# Patient Record
Sex: FEMALE | Race: ASIAN | NOT HISPANIC OR LATINO | Employment: FULL TIME | URBAN - METROPOLITAN AREA
[De-identification: names, ages, dates, MRNs, and addresses within clinical notes are randomized per-mention and may not be internally consistent; named-entity substitution may affect disease eponyms.]

---

## 2017-03-09 ENCOUNTER — ALLSCRIPTS OFFICE VISIT (OUTPATIENT)
Dept: OTHER | Facility: OTHER | Age: 47
End: 2017-03-09

## 2017-03-09 LAB
BILIRUB UR QL STRIP: NORMAL
CLARITY UR: NORMAL
COLOR UR: YELLOW
GLUCOSE (HISTORICAL): NORMAL
HGB UR QL STRIP.AUTO: 250
KETONES UR STRIP-MCNC: NORMAL MG/DL
LEUKOCYTE ESTERASE UR QL STRIP: 500
NITRITE UR QL STRIP: NORMAL
PH UR STRIP.AUTO: 7 [PH]
PROT UR STRIP-MCNC: NORMAL MG/DL
SP GR UR STRIP.AUTO: 1.01
UROBILINOGEN UR QL STRIP.AUTO: NORMAL

## 2017-03-12 ENCOUNTER — LAB CONVERSION - ENCOUNTER (OUTPATIENT)
Dept: OTHER | Facility: OTHER | Age: 47
End: 2017-03-12

## 2017-03-12 LAB
BACTERIA UR QL AUTO: ABNORMAL /HPF
BILIRUB UR QL STRIP: NEGATIVE
COLOR UR: YELLOW
COMMENT (HISTORICAL): CLEAR
CULTURE RESULT (HISTORICAL): ABNORMAL
FECAL OCCULT BLOOD DIAGNOSTIC (HISTORICAL): ABNORMAL
GLUCOSE (HISTORICAL): NEGATIVE
HYALINE CASTS #/AREA URNS LPF: ABNORMAL /LPF
KETONES UR STRIP-MCNC: NEGATIVE MG/DL
LEUKOCYTE ESTERASE UR QL STRIP: ABNORMAL
NITRITE UR QL STRIP: POSITIVE
PH UR STRIP.AUTO: 7 [PH] (ref 5–8)
PROT UR STRIP-MCNC: ABNORMAL MG/DL
RBC (HISTORICAL): ABNORMAL /HPF
SP GR UR STRIP.AUTO: 1.02 (ref 1–1.03)
SQUAMOUS EPITHELIAL CELLS (HISTORICAL): ABNORMAL /HPF
WBC # BLD AUTO: ABNORMAL /HPF

## 2017-03-13 ENCOUNTER — GENERIC CONVERSION - ENCOUNTER (OUTPATIENT)
Dept: OTHER | Facility: OTHER | Age: 47
End: 2017-03-13

## 2017-05-03 ENCOUNTER — APPOINTMENT (OUTPATIENT)
Dept: LAB | Facility: CLINIC | Age: 47
End: 2017-05-03
Payer: COMMERCIAL

## 2017-05-03 DIAGNOSIS — E78.5 HYPERLIPIDEMIA: ICD-10-CM

## 2017-05-03 LAB
CHOLEST SERPL-MCNC: 215 MG/DL (ref 50–200)
HDLC SERPL-MCNC: 57 MG/DL (ref 40–60)
LDLC SERPL CALC-MCNC: 142 MG/DL (ref 0–100)
TRIGL SERPL-MCNC: 78 MG/DL

## 2017-05-03 PROCEDURE — 36415 COLL VENOUS BLD VENIPUNCTURE: CPT

## 2017-05-03 PROCEDURE — 80061 LIPID PANEL: CPT

## 2017-05-17 ENCOUNTER — ALLSCRIPTS OFFICE VISIT (OUTPATIENT)
Dept: OTHER | Facility: OTHER | Age: 47
End: 2017-05-17

## 2017-05-19 ENCOUNTER — ALLSCRIPTS OFFICE VISIT (OUTPATIENT)
Dept: OTHER | Facility: OTHER | Age: 47
End: 2017-05-19

## 2017-05-19 LAB
BILIRUB UR QL STRIP: NORMAL
CLARITY UR: NORMAL
COLOR UR: YELLOW
GLUCOSE (HISTORICAL): NORMAL
HGB UR QL STRIP.AUTO: 250
KETONES UR STRIP-MCNC: NORMAL MG/DL
LEUKOCYTE ESTERASE UR QL STRIP: NORMAL
NITRITE UR QL STRIP: NORMAL
PH UR STRIP.AUTO: 7 [PH]
PROT UR STRIP-MCNC: NORMAL MG/DL
SP GR UR STRIP.AUTO: 1.01
UROBILINOGEN UR QL STRIP.AUTO: NORMAL

## 2017-05-21 ENCOUNTER — GENERIC CONVERSION - ENCOUNTER (OUTPATIENT)
Dept: OTHER | Facility: OTHER | Age: 47
End: 2017-05-21

## 2017-05-21 ENCOUNTER — LAB CONVERSION - ENCOUNTER (OUTPATIENT)
Dept: OTHER | Facility: OTHER | Age: 47
End: 2017-05-21

## 2017-05-21 LAB
BACTERIA UR QL AUTO: ABNORMAL /HPF
BILIRUB UR QL STRIP: NEGATIVE
COLOR UR: YELLOW
COMMENT (HISTORICAL): CLEAR
CULTURE RESULT (HISTORICAL): ABNORMAL
CULTURE RESULT (HISTORICAL): NORMAL
FECAL OCCULT BLOOD DIAGNOSTIC (HISTORICAL): ABNORMAL
GLUCOSE (HISTORICAL): NEGATIVE
HYALINE CASTS #/AREA URNS LPF: ABNORMAL /LPF
KETONES UR STRIP-MCNC: NEGATIVE MG/DL
LEUKOCYTE ESTERASE UR QL STRIP: ABNORMAL
NITRITE UR QL STRIP: NEGATIVE
PH UR STRIP.AUTO: 7 [PH] (ref 5–8)
PROT UR STRIP-MCNC: NEGATIVE MG/DL
RBC (HISTORICAL): ABNORMAL /HPF
SP GR UR STRIP.AUTO: 1.01 (ref 1–1.03)
SQUAMOUS EPITHELIAL CELLS (HISTORICAL): ABNORMAL /HPF
WBC # BLD AUTO: ABNORMAL /HPF

## 2017-05-23 ENCOUNTER — ALLSCRIPTS OFFICE VISIT (OUTPATIENT)
Dept: OTHER | Facility: OTHER | Age: 47
End: 2017-05-23

## 2017-06-15 ENCOUNTER — ALLSCRIPTS OFFICE VISIT (OUTPATIENT)
Dept: OTHER | Facility: OTHER | Age: 47
End: 2017-06-15

## 2017-06-15 DIAGNOSIS — N39.0 URINARY TRACT INFECTION: ICD-10-CM

## 2017-06-15 DIAGNOSIS — R31.9 HEMATURIA: ICD-10-CM

## 2017-06-15 LAB
BILIRUB UR QL STRIP: NORMAL
CLARITY UR: NORMAL
COLOR UR: YELLOW
GLUCOSE (HISTORICAL): NORMAL
HGB UR QL STRIP.AUTO: 250
KETONES UR STRIP-MCNC: NORMAL MG/DL
LEUKOCYTE ESTERASE UR QL STRIP: NORMAL
NITRITE UR QL STRIP: NORMAL
PH UR STRIP.AUTO: 5 [PH]
PROT UR STRIP-MCNC: NORMAL MG/DL
SP GR UR STRIP.AUTO: 1
UROBILINOGEN UR QL STRIP.AUTO: NORMAL

## 2017-06-16 ENCOUNTER — APPOINTMENT (OUTPATIENT)
Dept: LAB | Facility: CLINIC | Age: 47
End: 2017-06-16
Payer: COMMERCIAL

## 2017-06-16 ENCOUNTER — TRANSCRIBE ORDERS (OUTPATIENT)
Dept: ADMINISTRATIVE | Facility: HOSPITAL | Age: 47
End: 2017-06-16

## 2017-06-16 DIAGNOSIS — E78.5 HYPERLIPIDEMIA: ICD-10-CM

## 2017-06-16 DIAGNOSIS — R31.9 HEMATURIA SYNDROME: Primary | ICD-10-CM

## 2017-06-16 LAB
ALBUMIN SERPL BCP-MCNC: 3.5 G/DL (ref 3.5–5)
ALP SERPL-CCNC: 66 U/L (ref 46–116)
ALT SERPL W P-5'-P-CCNC: 32 U/L (ref 12–78)
ANION GAP SERPL CALCULATED.3IONS-SCNC: 5 MMOL/L (ref 4–13)
AST SERPL W P-5'-P-CCNC: 21 U/L (ref 5–45)
BILIRUB SERPL-MCNC: 0.5 MG/DL (ref 0.2–1)
BUN SERPL-MCNC: 12 MG/DL (ref 5–25)
CALCIUM SERPL-MCNC: 8.7 MG/DL (ref 8.3–10.1)
CHLORIDE SERPL-SCNC: 103 MMOL/L (ref 100–108)
CO2 SERPL-SCNC: 33 MMOL/L (ref 21–32)
CREAT SERPL-MCNC: 0.69 MG/DL (ref 0.6–1.3)
ERYTHROCYTE [DISTWIDTH] IN BLOOD BY AUTOMATED COUNT: 12.2 % (ref 11.6–15.1)
GFR SERPL CREATININE-BSD FRML MDRD: >60 ML/MIN/1.73SQ M
GLUCOSE P FAST SERPL-MCNC: 97 MG/DL (ref 65–99)
HCT VFR BLD AUTO: 45.9 % (ref 34.8–46.1)
HGB BLD-MCNC: 14.8 G/DL (ref 11.5–15.4)
MCH RBC QN AUTO: 29.3 PG (ref 26.8–34.3)
MCHC RBC AUTO-ENTMCNC: 32.2 G/DL (ref 31.4–37.4)
MCV RBC AUTO: 91 FL (ref 82–98)
PLATELET # BLD AUTO: 221 THOUSANDS/UL (ref 149–390)
PMV BLD AUTO: 10.3 FL (ref 8.9–12.7)
POTASSIUM SERPL-SCNC: 3.8 MMOL/L (ref 3.5–5.3)
PROT SERPL-MCNC: 7.3 G/DL (ref 6.4–8.2)
RBC # BLD AUTO: 5.05 MILLION/UL (ref 3.81–5.12)
SODIUM SERPL-SCNC: 141 MMOL/L (ref 136–145)
WBC # BLD AUTO: 11 THOUSAND/UL (ref 4.31–10.16)

## 2017-06-16 PROCEDURE — 85027 COMPLETE CBC AUTOMATED: CPT

## 2017-06-16 PROCEDURE — 36415 COLL VENOUS BLD VENIPUNCTURE: CPT

## 2017-06-16 PROCEDURE — 80053 COMPREHEN METABOLIC PANEL: CPT

## 2017-06-19 ENCOUNTER — GENERIC CONVERSION - ENCOUNTER (OUTPATIENT)
Dept: OTHER | Facility: OTHER | Age: 47
End: 2017-06-19

## 2017-06-22 ENCOUNTER — HOSPITAL ENCOUNTER (OUTPATIENT)
Dept: CT IMAGING | Facility: HOSPITAL | Age: 47
Discharge: HOME | End: 2017-06-22
Attending: FAMILY MEDICINE
Payer: COMMERCIAL

## 2017-06-22 DIAGNOSIS — R31.9 HEMATURIA: ICD-10-CM

## 2017-06-22 PROCEDURE — 74176 CT ABD & PELVIS W/O CONTRAST: CPT

## 2017-06-26 ENCOUNTER — GENERIC CONVERSION - ENCOUNTER (OUTPATIENT)
Dept: OTHER | Facility: OTHER | Age: 47
End: 2017-06-26

## 2017-06-29 ENCOUNTER — ALLSCRIPTS OFFICE VISIT (OUTPATIENT)
Dept: OTHER | Facility: OTHER | Age: 47
End: 2017-06-29

## 2017-07-21 ENCOUNTER — TRANSCRIBE ORDERS (OUTPATIENT)
Dept: LAB | Facility: CLINIC | Age: 47
End: 2017-07-21

## 2017-07-21 ENCOUNTER — APPOINTMENT (OUTPATIENT)
Dept: LAB | Facility: CLINIC | Age: 47
End: 2017-07-21
Payer: COMMERCIAL

## 2017-07-21 DIAGNOSIS — R31.9 HEMATURIA: ICD-10-CM

## 2017-07-21 DIAGNOSIS — N39.0 URINARY TRACT INFECTION: ICD-10-CM

## 2017-07-21 LAB
ALBUMIN SERPL BCP-MCNC: 3.6 G/DL (ref 3.5–5)
ALP SERPL-CCNC: 68 U/L (ref 46–116)
ALT SERPL W P-5'-P-CCNC: 30 U/L (ref 12–78)
ANION GAP SERPL CALCULATED.3IONS-SCNC: 5 MMOL/L (ref 4–13)
AST SERPL W P-5'-P-CCNC: 27 U/L (ref 5–45)
BASOPHILS # BLD AUTO: 0.01 THOUSANDS/ΜL (ref 0–0.1)
BASOPHILS NFR BLD AUTO: 0 % (ref 0–1)
BILIRUB SERPL-MCNC: 0.6 MG/DL (ref 0.2–1)
BUN SERPL-MCNC: 12 MG/DL (ref 5–25)
CALCIUM SERPL-MCNC: 9.1 MG/DL (ref 8.3–10.1)
CHLORIDE SERPL-SCNC: 100 MMOL/L (ref 100–108)
CO2 SERPL-SCNC: 32 MMOL/L (ref 21–32)
CREAT SERPL-MCNC: 0.69 MG/DL (ref 0.6–1.3)
EOSINOPHIL # BLD AUTO: 0.12 THOUSAND/ΜL (ref 0–0.61)
EOSINOPHIL NFR BLD AUTO: 2 % (ref 0–6)
ERYTHROCYTE [DISTWIDTH] IN BLOOD BY AUTOMATED COUNT: 11.7 % (ref 11.6–15.1)
GFR SERPL CREATININE-BSD FRML MDRD: >60 ML/MIN/1.73SQ M
GLUCOSE SERPL-MCNC: 91 MG/DL (ref 65–140)
HCT VFR BLD AUTO: 43.3 % (ref 34.8–46.1)
HGB BLD-MCNC: 14.3 G/DL (ref 11.5–15.4)
LYMPHOCYTES # BLD AUTO: 2.25 THOUSANDS/ΜL (ref 0.6–4.47)
LYMPHOCYTES NFR BLD AUTO: 40 % (ref 14–44)
MCH RBC QN AUTO: 29.3 PG (ref 26.8–34.3)
MCHC RBC AUTO-ENTMCNC: 33 G/DL (ref 31.4–37.4)
MCV RBC AUTO: 89 FL (ref 82–98)
MONOCYTES # BLD AUTO: 0.36 THOUSAND/ΜL (ref 0.17–1.22)
MONOCYTES NFR BLD AUTO: 7 % (ref 4–12)
NEUTROPHILS # BLD AUTO: 2.84 THOUSANDS/ΜL (ref 1.85–7.62)
NEUTS SEG NFR BLD AUTO: 51 % (ref 43–75)
PLATELET # BLD AUTO: 223 THOUSANDS/UL (ref 149–390)
PMV BLD AUTO: 10 FL (ref 8.9–12.7)
POTASSIUM SERPL-SCNC: 3.6 MMOL/L (ref 3.5–5.3)
PROT SERPL-MCNC: 7.2 G/DL (ref 6.4–8.2)
RBC # BLD AUTO: 4.88 MILLION/UL (ref 3.81–5.12)
SODIUM SERPL-SCNC: 137 MMOL/L (ref 136–145)
WBC # BLD AUTO: 5.58 THOUSAND/UL (ref 4.31–10.16)

## 2017-07-21 PROCEDURE — 85025 COMPLETE CBC W/AUTO DIFF WBC: CPT

## 2017-07-21 PROCEDURE — 36415 COLL VENOUS BLD VENIPUNCTURE: CPT

## 2017-07-21 PROCEDURE — 80053 COMPREHEN METABOLIC PANEL: CPT

## 2017-07-31 ENCOUNTER — ALLSCRIPTS OFFICE VISIT (OUTPATIENT)
Dept: OTHER | Facility: OTHER | Age: 47
End: 2017-07-31

## 2017-09-21 ENCOUNTER — ALLSCRIPTS OFFICE VISIT (OUTPATIENT)
Dept: OTHER | Facility: OTHER | Age: 47
End: 2017-09-21

## 2017-09-21 LAB
CLARITY UR: NORMAL
COLOR UR: CLEAR
GLUCOSE (HISTORICAL): NORMAL
HGB UR QL STRIP.AUTO: NORMAL
KETONES UR STRIP-MCNC: NORMAL MG/DL
LEUKOCYTE ESTERASE UR QL STRIP: NORMAL
NITRITE UR QL STRIP: NORMAL
PH UR STRIP.AUTO: 7.5 [PH]
PROT UR STRIP-MCNC: NORMAL MG/DL
SP GR UR STRIP.AUTO: 1

## 2017-10-17 ENCOUNTER — APPOINTMENT (OUTPATIENT)
Dept: LAB | Facility: HOSPITAL | Age: 47
End: 2017-10-17
Attending: UROLOGY
Payer: COMMERCIAL

## 2017-10-17 ENCOUNTER — ALLSCRIPTS OFFICE VISIT (OUTPATIENT)
Dept: OTHER | Facility: OTHER | Age: 47
End: 2017-10-17

## 2017-10-17 DIAGNOSIS — R30.0 DYSURIA: ICD-10-CM

## 2017-10-17 DIAGNOSIS — R31.9 HEMATURIA: ICD-10-CM

## 2017-10-17 LAB
CLARITY UR: NORMAL
COLOR UR: YELLOW
GLUCOSE (HISTORICAL): NORMAL
HGB UR QL STRIP.AUTO: NORMAL
KETONES UR STRIP-MCNC: NORMAL MG/DL
LEUKOCYTE ESTERASE UR QL STRIP: NORMAL
NITRITE UR QL STRIP: NORMAL
PH UR STRIP.AUTO: 6 [PH]
PROT UR STRIP-MCNC: NORMAL MG/DL
SP GR UR STRIP.AUTO: 1.01

## 2017-10-17 PROCEDURE — 88112 CYTOPATH CELL ENHANCE TECH: CPT

## 2017-11-13 ENCOUNTER — APPOINTMENT (OUTPATIENT)
Dept: LAB | Facility: CLINIC | Age: 47
End: 2017-11-13
Payer: COMMERCIAL

## 2017-11-13 ENCOUNTER — TRANSCRIBE ORDERS (OUTPATIENT)
Dept: LAB | Facility: CLINIC | Age: 47
End: 2017-11-13

## 2017-11-13 DIAGNOSIS — E78.5 HYPERLIPIDEMIA: ICD-10-CM

## 2017-11-13 LAB
CHOLEST SERPL-MCNC: 218 MG/DL (ref 50–200)
HDLC SERPL-MCNC: 54 MG/DL (ref 40–60)
LDLC SERPL CALC-MCNC: 150 MG/DL (ref 0–100)
TRIGL SERPL-MCNC: 72 MG/DL
TSH SERPL DL<=0.05 MIU/L-ACNC: 1.44 UIU/ML (ref 0.36–3.74)

## 2017-11-13 PROCEDURE — 36415 COLL VENOUS BLD VENIPUNCTURE: CPT

## 2017-11-13 PROCEDURE — 84443 ASSAY THYROID STIM HORMONE: CPT

## 2017-11-13 PROCEDURE — 80061 LIPID PANEL: CPT

## 2017-11-17 ENCOUNTER — ALLSCRIPTS OFFICE VISIT (OUTPATIENT)
Dept: OTHER | Facility: OTHER | Age: 47
End: 2017-11-17

## 2017-11-17 DIAGNOSIS — N39.0 URINARY TRACT INFECTION: ICD-10-CM

## 2017-11-17 LAB
BILIRUB UR QL STRIP: NORMAL
CLARITY UR: NORMAL
COLOR UR: YELLOW
GLUCOSE (HISTORICAL): NORMAL
HGB UR QL STRIP.AUTO: 50
KETONES UR STRIP-MCNC: NORMAL MG/DL
LEUKOCYTE ESTERASE UR QL STRIP: NORMAL
NITRITE UR QL STRIP: NORMAL
PH UR STRIP.AUTO: 7 [PH]
PROT UR STRIP-MCNC: NORMAL MG/DL
SP GR UR STRIP.AUTO: 1.01
UROBILINOGEN UR QL STRIP.AUTO: NORMAL

## 2017-11-18 NOTE — PROGRESS NOTES
Assessment    1  Hematuria (599 70) (R31 9)   2  Acute lower UTI (599 0) (N39 0)   3  Vaginal discharge (623 5) (N89 8)    Plan   Vaginal discharge    · *(Q) SURESWAB(R), VAGINOSIS/VAGINITIS PLUS; Status:Active; Requestedfor:2017;   Urine Dip Automated- POC; Status:Active - Perform Order; Requested [id number];  [id number]; Ordered; For:Acute UTI (urinary tract infection); Ordered By:Julisa Galvan;    Discussion/Summary    20FP F presented to the office with her boyfriend for evaluation of hematuriaHematuriaurine dip negative in office for UTI but (+) bloodUCx not sentpt does have a pre-existing h/o hematuria and follows with Urologys/p cystoscopy 10/17/2017 - advised to follow up in 1yr with UA and US postvoid residualsVaginal dischargeetiology unclear - t/c BV, yeast infectionvaginal exam done in the office and SureSwab sentwill contact pt with results to determine further course of treatment - pt agreeable  Chief Complaint  blood in urine      History of Present Illness  HPI: 50yo F presents to the office with her BF for evaluation of blood in urinestarted seeing blood in urine yesterdaywas evaluated by Urology 10/17/2017 - had a cystoscopy which showed that hematuria w/u is consistent with a thick bladder wall which maybe a consequence of increased outlet resistance from prior bladder sling surgery   no other significant path detected(+) increased urinary frequency - no burning/pain or pressure when urinatingwhite discharge n4ozebe - not thick, not clumpy, no odor to itnot sexually active for the past 5months; do not use condomsdenies F/C/CP/SOB/abd pain/suprapubic pain/CVA tenderness- CBC, CMP from 2017 within normal range      Review of Systems    ROS reviewed  as per HPI      Active Problems  1  Acute cystitis with hematuria (595 0) (N30 01)   2  Acute lower UTI (599 0) (N39 0)   3  Acute upper respiratory infection (465 9) (J06 9)   4   Acute UTI (urinary tract infection) (599 0) (N39 0) 5  Allergic rhinitis (477 9) (J30 9)   6  Asthma (493 90) (J45 909)   7  Asthma, mild intermittent (493 90) (J45 20)   8  Dysuria (788 1) (R30 0)   9  Encounter for routine gynecological examination (V72 31) (Z01 419)   10  Hematuria (599 70) (R31 9)   11  History of leukocytosis (V12 3) (Z86 2)   12  Hypercholesterolemia (272 0) (E78 00)   13  Hyperlipidemia (272 4) (E78 5)   14  Hypocalcemia (275 41) (E83 51)   15  Iron deficiency anemia (280 9) (D50 9)   16  Need for influenza vaccination (V04 81) (Z23)   17  Need for vaccine for TD (tetanus-diphtheria) (V06 5) (Z23)   18  Nonspecific abnormal results of function study of thyroid (794 5) (R94 6)   19  Plantar fasciitis, left (728 71) (M72 2)   20  Visit for screening mammogram (V76 12) (Z12 31)    Past Medical History  1  History of leukocytosis (V12 3) (Z86 2)   2  Need for vaccine for TD (tetanus-diphtheria) (V06 5) (Z23)  Active Problems And Past Medical History Reviewed: The active problems and past medical history were reviewed and updated today  Family History  Mother    1  Denied: Family history of depression   2  Denied: Family history of substance abuse  Father    3  Denied: Family history of depression   4  Denied: Family history of substance abuse  Sibling    5  Denied: Family history of depression  Family History Reviewed: The family history was reviewed and updated today  Social History   · Four children   · Never a smoker   · Never A Smoker   · No drug use   · Single   · Social alcohol use (Z78 9)  The social history was reviewed and updated today  Surgical History    1  History of Diagnostic Cystoscopy   2  History of Suprapubic Sling Operation  Surgical History Reviewed: The surgical history was reviewed and updated today  Current Meds   1  Montelukast Sodium 10 MG Oral Tablet; 1 Tab daily; Therapy: 64AHL4192 to (Last Rx:56Npx3909)  Requested for: 71CNC4988 Ordered   2   ProAir  (90 Base) MCG/ACT Inhalation Aerosol Solution; 2 puffs every 4 houre prn; Therapy: 53PPV4127 to (Last Rx:69Sfy2472)  Requested for: 93UAE3120 Ordered    The medication list was reviewed and updated today  Allergies  1  No Known Drug Allergies    Vitals   Recorded: 48YFU4974 09:40AM   Temperature 98 F   Heart Rate 67   Respiration 16   Systolic 285   Diastolic 70   Height 5 ft 3 in   Weight 151 lb    BMI Calculated 26 75   BSA Calculated 1 72   O2 Saturation 97       Physical Exam   Constitutional  General appearance: No acute distress, well appearing and well nourished  Eyes  Conjunctiva and lids: No swelling, erythema or discharge  Pupils and irises: Equal, round, reactive to light  Pupils: no nystagmus  Cornea, Lens, and Sclera: Bilateral eyes: sclera was not yellow  Pulmonary  Respiratory effort: No increased work of breathing or signs of respiratory distress  Auscultation of lungs: Clear to auscultation  Cardiovascular  Auscultation of heart: Normal rate and rhythm, normal S1 and S2, no murmurs  Heart sounds: normal S1-- and-- normal S2  no murmurs were heard  Abdomen  Abdomen: Non-tender, no masses  The abdomen was flat  Bowel sounds were normal  The abdomen was soft and nontender  There was tenderness not in the suprapubic area  No rebound tenderness  No guarding  no CVA tenderness  Genitourinary  External genitalia and vagina: Normal, no lesions appreciated  External genitalia: normal,-- normal hair distribution,-- no vulvar atrophy-- and-- no condyloma acuminatum  Labia majora: normal  Labia minora: normal  Vagina: scant bleeding, but-- normal-- and-- no discharge  Urethra: Normal, no discharge  The urethra was normal   Cervix: Normal, no lesions  Examination of the cervix revealed normal findings,-- no vesicles-- and-- no discharge  Bimanual exam findings include a soft cervix, but-- no cervical motion tenderness    Musculoskeletal  Gait and station: Normal    Psychiatric  Orientation to person, place, and time: Normal    Mood and affect: Normal        Future Appointments    Date/Time Provider Specialty Site   11/20/2017 08:00 AM ANA MARIA Rubio  [facility]   10/25/2018 08:15 AM ANA MARIA Fatima   Urology [facility]   Electronically signed by : Daryle Sheng, DO; Nov 17 2017  1:07PM EST                       (Author)

## 2017-11-20 ENCOUNTER — ALLSCRIPTS OFFICE VISIT (OUTPATIENT)
Dept: OTHER | Facility: OTHER | Age: 47
End: 2017-11-20

## 2017-11-21 ENCOUNTER — LAB CONVERSION - ENCOUNTER (OUTPATIENT)
Dept: OTHER | Facility: OTHER | Age: 47
End: 2017-11-21

## 2017-11-21 LAB
ATOPOBIUM VAGINAE (HISTORICAL): NOT DETECTED LOG (CELLS/ML)
BV CATEGORY (HISTORICAL): NORMAL
C. ALBICANS, DNA OR PCR (HISTORICAL): NOT DETECTED
C. GLABRATA, DNA OR PCR (HISTORICAL): NOT DETECTED
C. PARAPSILOSIS, DNA OR PCR (HISTORICAL): NOT DETECTED
C. TROPICALIS, DNA OR PCR (HISTORICAL): NOT DETECTED
CHLAMYDIA TRACHOMATIS BY MOL. METHOD (HISTORICAL): NOT DETECTED
GARDNERELLA VAGINALIS (HISTORICAL): NOT DETECTED LOG (CELLS/ML)
GC BY MOL. METHOD (HISTORICAL): NOT DETECTED
LACTOBACILLUS (SPECIES) (HISTORICAL): NOT DETECTED LOG (CELLS/ML)
MEGASPHAERA TYPE 1 (HISTORICAL): NOT DETECTED LOG (CELLS/ML)
TRICHOMONAS (HISTORICAL): NOT DETECTED

## 2017-11-21 NOTE — PROGRESS NOTES
Assessment    1  Hyperlipidemia (272 4) (E78 5)   2  History of influenza vaccination (V49 89) (Z92 29)   3  Need for influenza vaccination (V04 81) (Z23)    Plan  Hyperlipidemia    · A diet low in sugar may help your condition ; Status:Complete;   Done: 73MVO4633   · Avoid alcoholic beverages ; Status:Complete;   Done: 58QAV0617   · Begin a limited exercise program ; Status:Complete;   Done: 77SZM8800   · Continue with our present treatment plan ; Status:Complete;   Done: 29FBG7304   · Eat a low fat and low cholesterol diet ; Status:Complete;   Done: 92SOP2777   · Eat no more than 30 grams of fat per day ; Status:Complete;   Done: 59LAT1908   · Some eating tips that can help you lose weight ; Status:Complete;   Done: 53FFE9185   · (1) CBC/ PLT (NO DIFF); Status:Active; Requested for:10Cqv0764;    · (1) COMPREHENSIVE METABOLIC PANEL; Status:Active; Requested for:45Buw4765;    · (1) LIPID PANEL, FASTING; Status:Active; Requested for:07May2018;   Need for influenza vaccination    · Fluzone Quadrivalent 0 5 ML Intramuscular Suspension Prefilled Syringe;INJECT 0 5  ML Intramuscular; To Be Done: 19ICY6728    Discussion/Summary    Her labs discussed with her and she has hyperlipidemia she has some improvement few months ago but now can LDL has gone up, she still has normal HDL  And her risk factors are low, like she does not smoke  And she has no family history of heart disease or stroke  And she has normal blood pressure  is slightly overweight discussed with her to lose weight and discussed in length with her about low-fat diet and we can still work on diet and exercise and follow-up labs in 6 months, discussed about medication statins but patient is still not willing,swab is pending the culture is not back yet,and f/u 6 months for hyperlipidemia, flu vaccine is given     The patient was counseled regarding diagnostic results,-- instructions for management,-- prognosis,-- impressions,-- risks and benefits of treatment options,-- importance of compliance with treatment  Chief Complaint  follow up, review labs      History of Present Illness  She is here with the boyfriend, she had labs for cholesterol checksays she is very active she works all day and walking  She does not eat high fat food  Previously she had hyperlipidemia and she controlled with the diet  is nonsmoker  And she denies any family history of heart diseasestroke  She has history of immaturity and follows urologist  And she had some vaginal discharge and last week she was checked by the other physician and vaginal swab was sent ,the result is pending      Review of Systems   Constitutional: No fever, no chills, feels well, no tiredness, no recent weight gain or weight loss  Eyes: No complaints of eye pain, no red eyes, no eyesight problems, no discharge, no dry eyes, no itching of eyes  ENT: no complaints of earache, no loss of hearing, no nose bleeds, no nasal discharge, no sore throat, no hoarseness  Cardiovascular: No complaints of slow heart rate, no fast heart rate, no chest pain, no palpitations, no leg claudication, no lower extremity edema  Respiratory: No complaints of shortness of breath, no wheezing, no cough, no SOB on exertion, no orthopnea, no PND  Gastrointestinal: No complaints of abdominal pain, no constipation, no nausea or vomiting, no diarrhea, no bloody stools  Genitourinary: as noted in HPI  Musculoskeletal: No complaints of arthralgias, no myalgias, no joint swelling or stiffness, no limb pain or swelling  Integumentary: No complaints of skin rash or lesions, no itching, no skin wounds, no breast pain or lump  Neurological: No complaints of headache, no confusion, no convulsions, no numbness, no dizziness or fainting, no tingling, no limb weakness, no difficulty walking  Psychiatric: Not suicidal, no sleep disturbance, no anxiety or depression, no change in personality, no emotional problems    Endocrine: No complaints of proptosis, no hot flashes, no muscle weakness, no deepening of the voice, no feelings of weakness  Hematologic/Lymphatic: No complaints of swollen glands, no swollen glands in the neck, does not bleed easily, does not bruise easily  ROS reviewed  Active Problems  1  Acute cystitis with hematuria (595 0) (N30 01)   2  Acute lower UTI (599 0) (N39 0)   3  Acute upper respiratory infection (465 9) (J06 9)   4  Acute UTI (urinary tract infection) (599 0) (N39 0)   5  Allergic rhinitis (477 9) (J30 9)   6  Asthma (493 90) (J45 909)   7  Asthma, mild intermittent (493 90) (J45 20)   8  Dysuria (788 1) (R30 0)   9  Encounter for routine gynecological examination (V72 31) (Z01 419)   10  Hematuria (599 70) (R31 9)   11  History of leukocytosis (V12 3) (Z86 2)   12  Hypercholesterolemia (272 0) (E78 00)   13  Hyperlipidemia (272 4) (E78 5)   14  Hypocalcemia (275 41) (E83 51)   15  Iron deficiency anemia (280 9) (D50 9)   16  Need for vaccine for TD (tetanus-diphtheria) (V06 5) (Z23)   17  Nonspecific abnormal results of function study of thyroid (794 5) (R94 6)   18  Plantar fasciitis, left (728 71) (M72 2)   19  Vaginal discharge (623 5) (N89 8)   20  Visit for screening mammogram (V76 12) (Z12 31)    Past Medical History  1  History of influenza vaccination (V49 89) (Z92 29)   2  History of leukocytosis (V12 3) (Z86 2)   3  Need for vaccine for TD (tetanus-diphtheria) (V06 5) (Z23)    The active problems and past medical history were reviewed and updated today  Surgical History  1  History of Diagnostic Cystoscopy   2  History of Suprapubic Sling Operation    The surgical history was reviewed and updated today  Family History  Mother    1  Denied: Family history of depression   2  Denied: Family history of substance abuse  Father    3  Denied: Family history of depression   4  Denied: Family history of substance abuse  Sibling    5   Denied: Family history of depression    The family history was reviewed and updated today  Social History     · Four children   · Never a smoker   · Never A Smoker   · No drug use   · Single   · Social alcohol use (Z78 9)  The social history was reviewed and updated today  Current Meds   1  Montelukast Sodium 10 MG Oral Tablet; 1 Tab daily; Therapy: 72AKD5151 to (Last Rx:80Qid9988)  Requested for: 39JHA1567 Ordered   2  ProAir  (90 Base) MCG/ACT Inhalation Aerosol Solution; 2 puffs every 4 houre prn; Therapy: 21KFQ4511 to (Last Rx:22Dmh6863)  Requested for: 23WMK4916 Ordered    The medication list was reviewed and updated today  Allergies  1  No Known Drug Allergies    Vitals  Vital Signs    Recorded: 20Nov2017 08:07AM   Heart Rate 71   Respiration 16   Systolic 360   Diastolic 70   Height 5 ft 3 in   Weight 151 lb    BMI Calculated 26 75   O2 Saturation 99       Physical Exam   Constitutional  General appearance: No acute distress, well appearing and well nourished  Eyes  Conjunctiva and lids: No swelling, erythema or discharge  Ears, Nose, Mouth, and Throat  External inspection of ears and nose: Normal    Oropharynx: Normal with no erythema, edema, exudate or lesions  Pulmonary  Respiratory effort: No increased work of breathing or signs of respiratory distress  Auscultation of lungs: Clear to auscultation  Cardiovascular  Palpation of heart: Normal PMI, no thrills  Auscultation of heart: Normal rate and rhythm, normal S1 and S2, without murmurs  Examination of extremities for edema and/or varicosities: Normal    Abdomen  Abdomen: Non-tender, no masses  Liver and spleen: No hepatomegaly or splenomegaly  Lymphatic  Palpation of lymph nodes in neck: No lymphadenopathy  Musculoskeletal  Gait and station: Normal    Inspection/palpation of joints, bones, and muscles: Normal    Skin  Skin and subcutaneous tissue: Normal without rashes or lesions  Neurologic  Cranial nerves: Cranial nerves 2-12 intact     Psychiatric  Orientation to person, place, and time: Normal          Results/Data  PHQ-2 Adult Depression Screening 20Nov2017 08:11AM User, Ahs     Test Name Result Flag Reference   PHQ-2 Adult Depression Score 0       Over the last two weeks, how often have you been bothered by any of the following problems? Little interest or pleasure in doing things: Not at all - 0 Feeling down, depressed, or hopeless: Not at all - 0   PHQ-2 Adult Depression Screening Negative       Urine Dip Automated- POC 70FCU3846 10:17AM Peterman Certain     Test Name Result Flag Reference   Color Yellow       Clarity Transparent     Leukocytes NEG     Nitrite NEG     Blood 50     Bilirubin NEG     Urobilinogen NEG     Protein NEG     Ph 7     Specific Gravity 1 010     Ketone NEG     Glucose NEG         Future Appointments    Date/Time Provider Specialty Site   05/16/2018 08:00 AM ANA MARIA Delgado  Family Medicine [facility]   10/25/2018 08:15 AM ANA MARIA Steele   Urology [facility]       Signatures   Electronically signed by : ANA MARIA Peña ; Nov 20 2017  9:04AM EST                       (Author)

## 2017-11-22 ENCOUNTER — GENERIC CONVERSION - ENCOUNTER (OUTPATIENT)
Dept: OTHER | Facility: OTHER | Age: 47
End: 2017-11-22

## 2018-01-10 NOTE — RESULT NOTES
Verified Results  CT RENAL STONE STUDY ABDOMEN PELVIS WO CONTRAST 23PUS2649 06:46AM [facility] Order Number: [id number]    - Patient Instructions: To schedule this appointment, please contact Central Scheduling at [phone]  Test Name Result Flag Reference   CT RENAL STONE STUDY ABDOMEN PELVIS WO CONTRAST (Report)     CT ABDOMEN AND PELVIS WITHOUT IV CONTRAST - LOW DOSE RENAL STONE      INDICATION: 57-year-old female, hematuria, dysuria      COMPARISON: 3/24/2015 female pelvic ultrasound     TECHNIQUE: Low dose thin section CT examination of the abdomen and pelvis was performed without intravenous or oral contrast according to a protocol specifically designed to evaluate for urinary tract calculus  Reformatted images were created in axial,   sagittal, and coronal planes  Evaluation for pathology in the abdomen and pelvis that is unrelated to urinary tract calculi is limited  Radiation dose length product (DLP) for this visit: 176 mGy-cm   This examination, like all CT scans performed in the [facility], was performed utilizing techniques to minimize radiation dose exposure, including the use of iterative    reconstruction and automated exposure control  FINDINGS:     RIGHT KIDNEY AND URETER:   No urinary tract calculi  No hydronephrosis or hydroureter  No perinephric collection  LEFT KIDNEY AND URETER:   No urinary tract calculi  No hydronephrosis or hydroureter  No perinephric collection  URINARY BLADDER:   Suspected mild diffuse bladder wall thickening, possibly related to cystitis, although this may be artifactual due to lack of complete distention  This was not visualized on previous ultrasound  There is nonspecific hepatomegaly, incompletely visualized  Further evaluation via right upper quadrant ultrasound may be considered if clinically appropriate     No significant abnormality in the visualized lung bases     Limited low radiation dose noncontrast CT evaluation demonstrates no clinically significant abnormality of spleen, pancreas, or adrenal glands  No calcified gallstones or gallbladder wall thickening noted  No bowel obstruction  No ascites or lymphadenopathy  Limited evaluation demonstrates no evidence to suggest acute appendicitis  No acute fracture or destructive osseous lesion is identified  IMPRESSION:   Suspected mild diffuse bladder wall thickening, possibly related to cystitis versus artifact due to lack of complete distention   Clinical correlation recommended     No evidence of hydronephrosis or nephrolithiasis     Incompletely visualized nonspecific hepatomegaly ; right upper quadrant ultrasound recommended          ##sigslh##sigslh       Workstation performed: [id number]     Signed by:   Laura Otero MD   6/26/17

## 2018-01-11 NOTE — RESULT NOTES
Verified Results  (1) COMPREHENSIVE METABOLIC PANEL 93MUU9208 72:38XD Nhan Jolley Order Number: [id number]     Test Name Result Flag Reference   GLUCOSE,RANDM 85 mg/dL     If the patient is fasting, the ADA then defines impaired fasting glucose as > 100 mg/dL and diabetes as > or equal to 123 mg/dL  SODIUM 137 mmol/L  136-145   POTASSIUM 4 0 mmol/L  3 5-5 3   CHLORIDE 100 mmol/L  100-108   CARBON DIOXIDE 32 mmol/L  21-32   ANION GAP (CALC) 5 mmol/L  4-13   BLOOD UREA NITROGEN 16 mg/dL  5-25   CREATININE 0 68 mg/dL  0 60-1 30   Standardized to IDMS reference method   CALCIUM 9 2 mg/dL  8 3-10 1   BILI, TOTAL 0 78 mg/dL  0 20-1 00   ALK PHOSPHATAS 51 U/L     ALT (SGPT) 39 U/L  12-78   AST(SGOT) 27 U/L  5-45   ALBUMIN 3 9 g/dL  3 5-5 0   TOTAL PROTEIN 7 5 g/dL  6 4-8 2   eGFR Non-African American      >60 0 ml/min/1 73sq m   - Patient Instructions: This is a fasting blood test  Water,black tea or black  coffee only after 9:00pm the night before test Drink 2 glasses of water the morning of test - Patient Instructions: This bloodwork is non-fasting  Please drink two glasses of   water morning of bloodwork  National Kidney Disease Education Program recommendations are as follows:  GFR calculation is accurate only with a steady state creatinine  Chronic Kidney disease less than 60 ml/min/1 73 sq  meters  Kidney failure less than 15 ml/min/1 73 sq  meters  (1) LIPID PANEL, FASTING 87VGC9892 08:59AM Nhan Jolley Order Number: [id number]     Test Name Result Flag Reference   CHOLESTEROL 262 mg/dL H    HDL,DIRECT 59 mg/dL  40-60   Specimen collection should occur prior to Metamizole administration due to the potential for falsely depressed results  LDL CHOLESTEROL CALCULATED 185 mg/dL H 0-100   - Patient Instructions:  This is a fasting blood test  Water,black tea or black  coffee only after 9:00pm the night before test   Drink 2 glasses of water the morning of test     - Patient Instructions: This is a fasting blood test  Water,black tea or black  coffee only after 9:00pm the night before test Drink 2 glasses of water the morning of test - Patient Instructions: This bloodwork is non-fasting  Please drink two glasses of   water morning of bloodwork  Cholesterol:         Desirable        <200 mg/dl      Borderline High  200-239 mg/dl      High             >239 mg/dl  LDL CALCULATED:    This screening LDL is a calculated result  It does not have the accuracy of the Direct Measured LDL in the monitoring of patients with hyperlipidemia and/or statin therapy  Direct Measure LDL (PZT356) must be ordered separately in these patients  TRIGLYCERIDES 89 mg/dL  <=150   Specimen collection should occur prior to N-Acetylcysteine or Metamizole administration due to the potential for falsely depressed results  (1) TSH 12VSP4685 08:59AM Marya Howell Order Number: [id number]     Test Name Result Flag Reference   TSH 1 440 uIU/mL  0 358-3 740   - Patient Instructions: This bloodwork is non-fasting  Please drink two glasses of water morning of bloodwork  - Patient Instructions: This is a fasting blood test  Water,black tea or black  coffee only after 9:00pm the night before test Drink 2 glasses of water the morning of test - Patient Instructions: This bloodwork is non-fasting  Please drink two glasses of   water morning of bloodwork  Patients undergoing fluorescein dye angiography may retain small amounts of fluorescein in the body for 48-72 hours post procedure  Samples containing fluorescein can produce falsely depressed TSH values  If the patient had this procedure,a specimen should be resubmitted post fluorescein clearance            The recommended reference ranges for TSH during pregnancy are as follows:  First trimester 0 1 to 2 5 uIU/mL  Second trimester  0 2 to 3 0 uIU/mL  Third trimester 0 3 to 3 0 uIU/m     (1) CBC/PLT/DIFF 07SVC5081 08:59AM Sandi Garcia TW Order Number: [id number]     Test Name Result Flag Reference   WBC COUNT 5 78 Thousand/uL  4 31-10 16   RBC COUNT 5 00 Million/uL  3 81-5 12   HEMOGLOBIN 15 1 g/dL  11 5-15 4   HEMATOCRIT 45 5 %  34 8-46  1   MCV 91 fL  82-98   MCH 30 2 pg  26 8-34 3   MCHC 33 2 g/dL  31 4-37 4   RDW 12 5 %  11 6-15 1   MPV 10 7 fL  8 9-12 7   PLATELET COUNT 500 Thousands/uL  149-390   nRBC AUTOMATED 0 /100 WBCs     NEUTROPHILS RELATIVE PERCENT 51 %  43-75   LYMPHOCYTES RELATIVE PERCENT 39 %  14-44   MONOCYTES RELATIVE PERCENT 8 %  4-12   EOSINOPHILS RELATIVE PERCENT 2 %  0-6   BASOPHILS RELATIVE PERCENT 0 %  0-1   NEUTROPHILS ABSOLUTE COUNT 2 91 Thousands/?L  1 85-7 62   LYMPHOCYTES ABSOLUTE COUNT 2 28 Thousands/?L  0 60-4 47   MONOCYTES ABSOLUTE COUNT 0 46 Thousand/?L  0 17-1 22   EOSINOPHILS ABSOLUTE COUNT 0 10 Thousand/?L  0 00-0 61   BASOPHILS ABSOLUTE COUNT 0 02 Thousands/?L  0 00-0 10   - Patient Instructions: This bloodwork is non-fasting  Please drink two glasses of water morning of bloodwork  - Patient Instructions: This bloodwork is non-fasting  Please drink two glasses of water morning of bloodwork  (1) FERRITIN 02DBM2462 08:59AM Herber Otero Order Number: [id number]     Test Name Result Flag Reference   FERRITIN 203 ng/mL  8-388   - Patient Instructions: This is a fasting blood test  Water,black tea or black  coffee only after 9:00pm the night before test Drink 2 glasses of water the morning of test - Patient Instructions: This bloodwork is non-fasting  Please drink two glasses of   water morning of bloodwork

## 2018-01-11 NOTE — RESULT NOTES
Verified Results  (1) URINALYSIS w URINE C/S REFLEX (will reflex a microscopy if leukocytes, occult blood, or nitrites are not within normal limits) [id number] 10:15AM Ana Cristina Garcia     Test Name Result Flag Reference   COLOR YELLOW  YELLOW   APPEARANCE CLEAR  CLEAR   SPECIFIC GRAVITY 1 019  1 001-1 035   PH 7 0  5 0-8 0   GLUCOSE NEGATIVE  NEGATIVE   BILIRUBIN NEGATIVE  NEGATIVE   KETONES NEGATIVE  NEGATIVE   OCCULT BLOOD 3+ A NEGATIVE   PROTEIN TRACE A NEGATIVE   NITRITE POSITIVE A NEGATIVE   LEUKOCYTE ESTERASE 3+ A NEGATIVE   WBC > OR = 60 /HPF A < OR = 5   RBC 40-60 /HPF A < OR = 2   SQUAMOUS EPITHELIAL CELLS NONE SEEN /HPF  < OR = 5   BACTERIA FEW /HPF A NONE SEEN   HYALINE CAST 0-5 /LPF A NONE SEEN   REFLEXIVE URINE CULTURE      CULTURE INDICATED - RESULTS TO FOLLOW     REFLEXIVE URINE CULTURE [id number] 10:15AM Cornell Parkih     Test Name Result Flag Reference   CULTURE, URINE, ROUTINE  A    CULTURE, URINE, ROUTINE         MICRO NUMBER:      [id number]    TEST STATUS:       FINAL    SPECIMEN SOURCE:   URINE    SPECIMEN QUALITY:  ADEQUATE    RESULT:            Greater than 100,000 CFU/mL of Escherichia coli                            E coli                            ----------------                            INT   MAGGY     AMOX/CLAVULANATE       S     <=2 0     AMPICILLIN             S     4 0     AMP/SULBACTAM          S     4 0     CEFAZOLIN              NR    <=4 0 **1     CEFEPIME               S     <=1 0     CEFTRIAXONE            S     <=1 0     CIPROFLOXACIN          S     <=0 25     ERTAPENEM              S     <=0 5     GENTAMICIN             S     <=1 0     IMIPENEM               S     <=0 25     LEVOFLOXACIN           S     <=0 12     NITROFURANTOIN         S     <=16 0     PIP/TAZOBACTAM         S     <=4 0     TOBRAMYCIN             S     <=1 0     TRIMETHOPRIM/SULFA     S     <=20 0  S=Susceptible  I=Intermediate  R=Resistant  * = Not Tested  NR = Not Reported  **NN = See Therapy Comments  THERAPY COMMENTS      Note 1:      ORAL therapy: A cefazolin MAGGY of < 32 predicts      susceptibility to the oral agents cefaclor,      cefdinir, cefpodoxime, cefprozil, cefuroxime,      cephalexin, and loracarbef when used for therapy      of uncomplicated UTIs due to E  coli,      K  pneumoniae, and P  mirabilis  PARENTERAL therapy: A cefazolin MAGGY of > 8      indicates resistance to parenteral cefazolin  An alternate test method must be performed to      to confirm susceptibility to parenteral cefazolin

## 2018-01-12 VITALS
HEART RATE: 62 BPM | DIASTOLIC BLOOD PRESSURE: 70 MMHG | HEIGHT: 63 IN | RESPIRATION RATE: 16 BRPM | BODY MASS INDEX: 25.16 KG/M2 | WEIGHT: 142 LBS | SYSTOLIC BLOOD PRESSURE: 110 MMHG

## 2018-01-12 VITALS
HEART RATE: 69 BPM | DIASTOLIC BLOOD PRESSURE: 68 MMHG | WEIGHT: 142 LBS | RESPIRATION RATE: 16 BRPM | HEIGHT: 63 IN | BODY MASS INDEX: 25.16 KG/M2 | SYSTOLIC BLOOD PRESSURE: 114 MMHG

## 2018-01-12 NOTE — PROGRESS NOTES
Assessment    1  Acute lower UTI (599 0) (N39 0)   2  Inflammatory heel pain, left (729 5) (M79 672)   3  Plantar fasciitis, left (728 71) (M72 2)    Plan  Acute lower UTI    · Ciprofloxacin HCl - 500 MG Oral Tablet; TAKE 1 TABLET TWICE DAILY   · Follow-up PRN Evaluation and Treatment  Follow-up  Status: Complete  Done:  [id number]   · Urine Dip Automated- POC; Status:Active - Perform Order; Requested [facility]:[id number];    · Call [phone] if: Pain when you urinate is not better in 3 days ; Status:Complete;    Done: 81UZC0776   · Call [phone] if: The symptoms come back after the medications are finished ;  Status:Complete;   Done: 62PVX5442   · Call [phone] if: You have nausea and vomiting that lasts longer than 8 hours ;  Status:Complete;   Done: 46NIX8847   · Call [phone] if: You have pain in the kidney or low back area ; Status:Complete;    Done: 05VPF0700   · Call [phone] if: Your temperature is higher than 101F ; Status:Complete;   Done:  42HBE5597   · Drink at least 6 glasses of clear liquids a day ; Status:Complete;   Done: 60TLH7970  Unlinked    · Symbicort 80-4 5 MCG/ACT Inhalation Aerosol    Discussion/Summary    Urine shows leukocytes and blood I will start her on antibiotic and urine is sent for culture, advised to drink plenty of water and if symptoms do not improve follow-up  She has left plantar fasciitis and mild heel tenderness discussed with her NSAIDs for a few days, advised to use a cushion in her shoes to give her support and she should do soaks in warm water and plantar stretching  If not feel better than follow-up,  The patient was counseled regarding diagnostic results, instructions for management, risks and benefits of treatment options, importance of compliance with treatment  Chief Complaint  blood in urine and frequent urination X1 day      History of Present Illness  HPI: see HPI   UTI, Acute:  The patient is being seen for an initial evaluation of this episode of a urinary tract infection  Symptoms: urinary frequency and urinary urgency, but no urinary incontinence, no nocturia, no malodorous urine, no abdominal pain, no back pain and no flank pain    The patient presents with complaints of gradual onset of moderate dysuria starting about 1 day ago  She is currently experiencing dysuria  Her symptoms are caused by no known event  Symptoms are not improved by analgesics  Risk Factors: no previous STD and no urologic surgery  Pertinent Medical History: no cystitis  The patient presents with complaints of gradual onset of mild hematuria starting about 1 day ago  Associated Symptoms: chills, but no general malaise, no fever, no myalgias, no nausea, no vomiting, no vaginal discharge and no urethral discharge  Current Treatment: she is currently not being treated for this problem  Foot Pain: The patient is being seen for an initial evaluation of foot pain  Symptoms:  localized tenderness and localized warmth, but no ankle pain, no leg pain, no localized bruising, no localized swelling, no localized redness, no foot stiffness, no limping and no refusal to bear weight    The patient presents with complaints of gradual onset of constant episodes of mild left foot pain  Episodes started about 1 month ago  She is currently experiencing foot pain  Symptoms are improved by rest  Symptoms are made worse by weight bearing, walking and standing  Risk Factors: no running, no foot reconstruction and no ankle/foot trauma  Pertinent Medical History: no bunions and no diabetes  The patient is currently experiencing symptoms  Associated symptoms:  no fever  Review of Systems    Constitutional: No fever, no chills, feels well, no tiredness, no recent weight gain or loss  ENT: no ear ache, no loss of hearing, no nosebleeds or nasal discharge, no sore throat or hoarseness     Cardiovascular: no complaints of slow or fast heart rate, no chest pain, no palpitations, no leg claudication or lower extremity edema  Respiratory: no complaints of shortness of breath, no wheezing, no dyspnea on exertion, no orthopnea or PND  Breasts: no complaints of breast pain, breast lump or nipple discharge  Gastrointestinal: no complaints of abdominal pain, no constipation, no nausea or diarrhea, no vomiting, no bloody stools  Genitourinary: as noted in HPI  Musculoskeletal: as noted in HPI  Integumentary: no complaints of skin rash or lesion, no itching or dry skin, no skin wounds  Neurological: no complaints of headache, no confusion, no numbness or tingling, no dizziness or fainting  ROS reviewed  Active Problems    1  Asthma with acute exacerbation (493 92) (J45 901)   2  Encounter for routine gynecological examination (V72 31) (Z01 419)   3  Hypercholesterolemia (272 0) (E78 00)   4  Hyperlipidemia (272 4) (E78 5)   5  Hypocalcemia (275 41) (E83 51)   6  Iron deficiency anemia (280 9) (D50 9)   7  Need for influenza vaccination (V04 81) (Z23)   8  Need for vaccine for TD (tetanus-diphtheria) (V06 5) (Z23)   9  Nonspecific abnormal results of function study of thyroid (794 5) (R94 6)   10  Visit for screening mammogram (V76 12) (Z12 31)    Past Medical History    1  Need for vaccine for TD (tetanus-diphtheria) (V06 5) (Z23)    Family History  Mother    1  No pertinent family history    Social History    · Four children   · Never a smoker   · Never A Smoker   · No drug use   · Single   · Social alcohol use (Z78 9)  The social history was reviewed and updated today  The social history was reviewed and is unchanged  Surgical History    1  History of Suprapubic Sling Operation    Current Meds   1  Symbicort 80-4 5 MCG/ACT Inhalation Aerosol; 2 puff INH BID; Therapy: [id number] to  Requested for: [id number] Recorded    The medication list was reviewed and updated today  Allergies    1   No Known Drug Allergies    Vitals   Recorded: 76RSO2087 09:35AM   Heart Rate 78 Respiration 16   Systolic 539   Diastolic 76   Height 5 ft 3 in   Weight 144 lb    BMI Calculated 25 51   BSA Calculated 1 69   O2 Saturation 98     Physical Exam    Constitutional   General appearance: No acute distress, well appearing and well nourished  Eyes   Conjunctiva and lids: No swelling, erythema or discharge  Ears, Nose, Mouth, and Throat   External inspection of ears and nose: Normal     Pulmonary   Respiratory effort: No increased work of breathing or signs of respiratory distress  Auscultation of lungs: Clear to auscultation  Cardiovascular   Palpation of heart: Normal PMI, no thrills  Auscultation of heart: Normal rate and rhythm, normal S1 and S2, without murmurs  Abdomen   Abdomen: Non-tender, no masses  Lymphatic   Palpation of lymph nodes in neck: No lymphadenopathy  Musculoskeletal   Gait and station: Normal     Inspection/palpation of joints, bones, and muscles: Abnormal   tender left heel  Neurologic   Cranial nerves: Cranial nerves 2-12 intact  Psychiatric   Orientation to person, place, and time: Normal          Future Appointments    Date/Time Provider Specialty Site   05/17/2017 08:20 ANA MARIA Landa   [address] [facility]     Signatures   Electronically signed by : ANA MARIA Curry ; Mar  9 2017 10:01AM EST                       (Author)

## 2018-01-12 NOTE — RESULT NOTES
Verified Results  *(Q) SURESWAB(R), VAGINOSIS/VAGINITIS PLUS [id number] 10:15AM Julisa Toro     Test Name Result Flag Reference   CHLAMYDIA TRACHOMATIS$RNA, TMA Not Detected  Not Detected   NEISSERIA [address], TMA Not Detected  Not Detected   This test was performed using the APTIMA COMBO2(R)  Assay (GEN-PROBE(R))  The analytical performance characteristics of this  assay, when used to test SurePath(R) specimens have  been determined by [employer]  LACTOBACILLUS SPECIES      Not Detected Log (cells/mL)   ATOPOBIUM VAGINAE      Not Detected Log (cells/mL)   MEGASPHAERA SPECIES      Not Detected Log (cells/mL)   GARDNERELLA VAGINALIS      Not Detected Log (cells/mL)   NOT SUPPORTIVE OF BV: The pattern of results is not  supportive of a diagnosis of BV: 1)Presence of  Lactobacillus spp , G  vaginalis levels less than 6 0  log cells/mL, and absence of A  vaginae and Megasphaera  spp; or 2)Absence of all targeted organisms; or  3)Absence of Lactobacillus spp   plus G  vaginalis  detected at levels less than 6 0 log cells/mL and  absence of A  vaginae and Megasphaera spp  EQUIVOCAL FOR BV: The pattern of results is neither  supportive nor not supportive of a diagnosis of BV  The patient may be in transition into or out of BV:  Presence of Lactobacillus spp  plus G  vaginalis  (greater or equal to 6 0 log cells/mL) and/or one of  the other BV-associated pathogens  SUPPORTIVE OF BV: The pattern of results is supportive  of a diagnosis of BV: Absence of Lactobacillus spp  and  presence of G  vaginalis greater than or equal to 6 0  log cells/mL and/or one or both of the other  BV-associated pathogens  Concentration for Lactobacilli (L  acidophilus/  crispatus, L  jensenii) are collectively reported under  the term "Lactobacillus spp ", as these species are  among the peroxide producing Lactobacilli thought to  be protective against bacterial vaginosis   Atopobium  vaginae, Megasphaera spp , and Gardnerella (greater  than 6 0 log cells/mL) have been associated with  vaginosis when present in the absence of peroxide  producing Lactobacilli  This test was developed and its analytical  performance characteristics have been determined  by [place], [place]  It has not been cleared or approved by the FDA  This  assay has been validated pursuant to the CLIA  regulations and is used for clinical purposes  TRICHOMONAS VAGINALIS RNA$QUALITATIVE TMA Not Detected  Not Detected   This test was performed using the APTIMA Trichomonas  vaginalis Assay ([employer])  For more information on this test, go to  [email or web address]/faq/  Trichomonastma   C  ALBICANS, DNA Not Detected  Not Detected   C  GLABRATA, DNA Not Detected  Not Detected   C  TROPICALIS, DNA Not Detected  Not Detected   C  PARAPSILOSIS, DNA Not Detected  Not Detected   This test was developed and its analytical  performance characteristics have been determined  by [place], [place]  It has not been cleared or approved by the FDA  This  assay has been validated pursuant to the CLIA  regulations and is used for clinical purposes     BV CATEGORY: NOT SUPPORTIVE  NOT SUPPORTIV

## 2018-01-13 VITALS
BODY MASS INDEX: 25.16 KG/M2 | RESPIRATION RATE: 16 BRPM | DIASTOLIC BLOOD PRESSURE: 70 MMHG | WEIGHT: 142 LBS | SYSTOLIC BLOOD PRESSURE: 110 MMHG | HEART RATE: 80 BPM | HEIGHT: 63 IN

## 2018-01-13 VITALS
HEART RATE: 74 BPM | SYSTOLIC BLOOD PRESSURE: 120 MMHG | DIASTOLIC BLOOD PRESSURE: 70 MMHG | HEIGHT: 63 IN | WEIGHT: 146 LBS | BODY MASS INDEX: 25.87 KG/M2

## 2018-01-13 VITALS
DIASTOLIC BLOOD PRESSURE: 76 MMHG | HEIGHT: 63 IN | HEART RATE: 78 BPM | BODY MASS INDEX: 25.52 KG/M2 | SYSTOLIC BLOOD PRESSURE: 124 MMHG | RESPIRATION RATE: 16 BRPM | OXYGEN SATURATION: 98 % | WEIGHT: 144 LBS

## 2018-01-13 VITALS
WEIGHT: 151 LBS | HEIGHT: 63 IN | DIASTOLIC BLOOD PRESSURE: 70 MMHG | SYSTOLIC BLOOD PRESSURE: 120 MMHG | OXYGEN SATURATION: 97 % | HEART RATE: 67 BPM | RESPIRATION RATE: 16 BRPM | BODY MASS INDEX: 26.75 KG/M2 | TEMPERATURE: 98 F

## 2018-01-13 VITALS
BODY MASS INDEX: 25.87 KG/M2 | DIASTOLIC BLOOD PRESSURE: 70 MMHG | WEIGHT: 146 LBS | HEIGHT: 63 IN | HEART RATE: 56 BPM | RESPIRATION RATE: 16 BRPM | SYSTOLIC BLOOD PRESSURE: 118 MMHG

## 2018-01-13 VITALS
SYSTOLIC BLOOD PRESSURE: 100 MMHG | HEIGHT: 63 IN | DIASTOLIC BLOOD PRESSURE: 80 MMHG | HEART RATE: 78 BPM | WEIGHT: 143.5 LBS | BODY MASS INDEX: 25.43 KG/M2

## 2018-01-14 VITALS
HEIGHT: 63 IN | SYSTOLIC BLOOD PRESSURE: 110 MMHG | BODY MASS INDEX: 25.16 KG/M2 | RESPIRATION RATE: 16 BRPM | HEART RATE: 76 BPM | TEMPERATURE: 97.9 F | DIASTOLIC BLOOD PRESSURE: 62 MMHG | WEIGHT: 142 LBS

## 2018-01-14 VITALS
RESPIRATION RATE: 16 BRPM | SYSTOLIC BLOOD PRESSURE: 110 MMHG | DIASTOLIC BLOOD PRESSURE: 80 MMHG | HEART RATE: 82 BPM | HEIGHT: 63 IN | BODY MASS INDEX: 25.34 KG/M2 | WEIGHT: 143 LBS

## 2018-01-14 VITALS
SYSTOLIC BLOOD PRESSURE: 110 MMHG | BODY MASS INDEX: 26.75 KG/M2 | OXYGEN SATURATION: 99 % | HEART RATE: 71 BPM | HEIGHT: 63 IN | WEIGHT: 151 LBS | DIASTOLIC BLOOD PRESSURE: 70 MMHG | RESPIRATION RATE: 16 BRPM

## 2018-01-14 NOTE — RESULT NOTES
Verified Results  (1) CBC/ PLT (NO DIFF) [id number] 08:39AM Teo Henriquez     Test Name Result Flag Reference   HEMATOCRIT 45 9 %  34 8-46 1   HEMOGLOBIN 14 8 g/dL  11 5-15 4   MCHC 32 2 g/dL  31 4-37 4   MCH 29 3 pg  26 8-34 3   MCV 91 fL  82-98   PLATELET COUNT 676 Thousands/uL  149-390   RBC COUNT 5 05 Million/uL  3 81-5 12   RDW 12 2 %  11 6-15 1   WBC COUNT 11 00 Thousand/uL H 4 31-10 16   MPV 10 3 fL  8 9-12 7     (1) COMPREHENSIVE METABOLIC PANEL [id number] 79:72JQ Teo Henriquez     Test Name Result Flag Reference   SODIUM 141 mmol/L  136-145   POTASSIUM 3 8 mmol/L  3 5-5 3   CHLORIDE 103 mmol/L  100-108   CARBON DIOXIDE 33 mmol/L H 21-32   ANION GAP (CALC) 5 mmol/L  4-13   BLOOD UREA NITROGEN 12 mg/dL  5-25   CREATININE 0 69 mg/dL  0 60-1 30   Standardized to IDMS reference method   CALCIUM 8 7 mg/dL  8 3-10 1   BILI, TOTAL 0 50 mg/dL  0 20-1 00   ALK PHOSPHATAS 66 U/L     ALT (SGPT) 32 U/L  12-78   AST(SGOT) 21 U/L  5-45   ALBUMIN 3 5 g/dL  3 5-5 0   TOTAL PROTEIN 7 3 g/dL  6 4-8 2   eGFR Non-African American      >60 0 ml/min/1 73sq [facility] Disease Education Program recommendations are as follows:  GFR calculation is accurate only with a steady state creatinine  Chronic Kidney disease less than 60 ml/min/1 73 sq  meters  Kidney failure less than 15 ml/min/1 73 sq  meters     GLUCOSE FASTING 97 mg/dL  65-99

## 2018-01-17 NOTE — RESULT NOTES
Verified Results  THINPREP TIS AND HPV mRNA E6/E7 [id number] 02:20PM Charolet Vic     Test Name Result Flag Reference   CLINICAL INFORMATION:      Normal exam   LMP:      AUG 2015   PREV  PAP:      2 YRS AGO   PREV  BX:      35964   SOURCE:      [facility]erv[facility]   STATEMENT OF ADEQUACY:      Satisfactory for evaluation  Endocervical/transformation zone component  present  INTERPRETATION/RESULT:      Negative for intraepithelial lesion or malignancy  COMMENT:      This Pap test has been evaluated with computer  assisted technology  CYTOTECHNOLOGIST:      TERI PATHAK(ASCP) CT Screening Location: [address] [place] [address]   HPV mRNA E6/E7 Not Detected  Not Detected   This test was performed using the APTIMA HPV Assay ([employer] )  This assay detects E6/E7 viral messenger RNA (mRNA) from 14  high-risk HPV types (16,18,31,33,35,39,45,51,52,56,58,59,66,68)         Discussion/Summary   Normal Pap smear

## 2018-01-17 NOTE — RESULT NOTES
Verified Results  (1) URINALYSIS w URINE C/S REFLEX (will reflex a microscopy if leukocytes, occult blood, or nitrites are not within normal limits) [id number] 12:00AM Ann Lance     Test Name Result Flag Reference   SPECIFIC GRAVITY 1 006  1 001-1 035   BILIRUBIN NEGATIVE  NEGATIVE   GLUCOSE NEGATIVE  NEGATIVE   COLOR YELLOW  YELLOW   APPEARANCE CLEAR  CLEAR   PH 7 0  5 0-8 0   KETONES NEGATIVE  NEGATIVE   OCCULT BLOOD 2+ A NEGATIVE   PROTEIN NEGATIVE  NEGATIVE   SQUAMOUS EPITHELIAL CELLS 0-5 /HPF  < OR = 5   HYALINE CAST NONE SEEN /LPF  NONE SEEN   REFLEXIVE URINE CULTURE      CULTURE INDICATED - RESULTS TO FOLLOW   RBC 0-2 /HPF  < OR = 2   NITRITE NEGATIVE  NEGATIVE   LEUKOCYTE ESTERASE 1+ A NEGATIVE   WBC 0-5 /HPF  < OR = 5   BACTERIA NONE SEEN /HPF  NONE SEEN     REFLEXIVE URINE CULTURE [id number] 12:00AM Ann Lance     Test Name Result Flag Reference   CULTURE, URINE, ROUTINE      CULTURE, URINE, ROUTINE         MICRO NUMBER:      [id number]    TEST STATUS:       FINAL    SPECIMEN SOURCE:   URINE    SPECIMEN QUALITY:  ADEQUATE    RESULT:            No Growth

## 2018-05-07 DIAGNOSIS — E78.5 HYPERLIPIDEMIA: ICD-10-CM

## 2018-05-10 ENCOUNTER — TRANSCRIBE ORDERS (OUTPATIENT)
Dept: LAB | Facility: CLINIC | Age: 48
End: 2018-05-10

## 2018-05-10 ENCOUNTER — APPOINTMENT (OUTPATIENT)
Dept: LAB | Facility: CLINIC | Age: 48
End: 2018-05-10
Payer: COMMERCIAL

## 2018-05-10 DIAGNOSIS — E78.5 HYPERLIPIDEMIA: ICD-10-CM

## 2018-05-10 LAB
ALBUMIN SERPL BCP-MCNC: 3.4 G/DL (ref 3.5–5)
ALP SERPL-CCNC: 53 U/L (ref 46–116)
ALT SERPL W P-5'-P-CCNC: 36 U/L (ref 12–78)
ANION GAP SERPL CALCULATED.3IONS-SCNC: 4 MMOL/L (ref 4–13)
AST SERPL W P-5'-P-CCNC: 20 U/L (ref 5–45)
BILIRUB SERPL-MCNC: 0.5 MG/DL (ref 0.2–1)
BUN SERPL-MCNC: 11 MG/DL (ref 5–25)
CALCIUM SERPL-MCNC: 8.7 MG/DL (ref 8.3–10.1)
CHLORIDE SERPL-SCNC: 105 MMOL/L (ref 100–108)
CHOLEST SERPL-MCNC: 220 MG/DL (ref 50–200)
CO2 SERPL-SCNC: 32 MMOL/L (ref 21–32)
CREAT SERPL-MCNC: 0.61 MG/DL (ref 0.6–1.3)
ERYTHROCYTE [DISTWIDTH] IN BLOOD BY AUTOMATED COUNT: 11.9 % (ref 11.6–15.1)
GFR SERPL CREATININE-BSD FRML MDRD: 107 ML/MIN/1.73SQ M
GLUCOSE P FAST SERPL-MCNC: 102 MG/DL (ref 65–99)
HCT VFR BLD AUTO: 44.6 % (ref 34.8–46.1)
HDLC SERPL-MCNC: 47 MG/DL (ref 40–60)
HGB BLD-MCNC: 15 G/DL (ref 11.5–15.4)
LDLC SERPL CALC-MCNC: 159 MG/DL (ref 0–100)
MCH RBC QN AUTO: 29.6 PG (ref 26.8–34.3)
MCHC RBC AUTO-ENTMCNC: 33.6 G/DL (ref 31.4–37.4)
MCV RBC AUTO: 88 FL (ref 82–98)
NONHDLC SERPL-MCNC: 173 MG/DL
PLATELET # BLD AUTO: 196 THOUSANDS/UL (ref 149–390)
PMV BLD AUTO: 9.9 FL (ref 8.9–12.7)
POTASSIUM SERPL-SCNC: 3.6 MMOL/L (ref 3.5–5.3)
PROT SERPL-MCNC: 6.9 G/DL (ref 6.4–8.2)
RBC # BLD AUTO: 5.06 MILLION/UL (ref 3.81–5.12)
SODIUM SERPL-SCNC: 141 MMOL/L (ref 136–145)
TRIGL SERPL-MCNC: 69 MG/DL
WBC # BLD AUTO: 5.25 THOUSAND/UL (ref 4.31–10.16)

## 2018-05-10 PROCEDURE — 80053 COMPREHEN METABOLIC PANEL: CPT

## 2018-05-10 PROCEDURE — 36415 COLL VENOUS BLD VENIPUNCTURE: CPT

## 2018-05-10 PROCEDURE — 80061 LIPID PANEL: CPT

## 2018-05-10 PROCEDURE — 85027 COMPLETE CBC AUTOMATED: CPT

## 2018-05-15 RX ORDER — MONTELUKAST SODIUM 10 MG/1
1 TABLET ORAL DAILY
COMMUNITY
Start: 2017-05-17 | End: 2018-05-16 | Stop reason: SDUPTHER

## 2018-05-15 RX ORDER — ALBUTEROL SULFATE 90 UG/1
2 AEROSOL, METERED RESPIRATORY (INHALATION) EVERY 4 HOURS PRN
COMMUNITY
Start: 2017-05-17 | End: 2019-05-10 | Stop reason: SDUPTHER

## 2018-05-16 ENCOUNTER — OFFICE VISIT (OUTPATIENT)
Dept: FAMILY MEDICINE CLINIC | Facility: CLINIC | Age: 48
End: 2018-05-16
Payer: COMMERCIAL

## 2018-05-16 VITALS
HEART RATE: 71 BPM | RESPIRATION RATE: 16 BRPM | BODY MASS INDEX: 25.98 KG/M2 | SYSTOLIC BLOOD PRESSURE: 110 MMHG | WEIGHT: 146.6 LBS | HEIGHT: 63 IN | DIASTOLIC BLOOD PRESSURE: 70 MMHG | OXYGEN SATURATION: 97 %

## 2018-05-16 DIAGNOSIS — J45.20 MILD INTERMITTENT ASTHMA WITHOUT COMPLICATION: Primary | ICD-10-CM

## 2018-05-16 DIAGNOSIS — R73.01 IMPAIRED FASTING BLOOD SUGAR: ICD-10-CM

## 2018-05-16 DIAGNOSIS — E78.2 MIXED HYPERLIPIDEMIA: ICD-10-CM

## 2018-05-16 DIAGNOSIS — Z12.31 VISIT FOR SCREENING MAMMOGRAM: ICD-10-CM

## 2018-05-16 DIAGNOSIS — J30.1 SEASONAL ALLERGIC RHINITIS DUE TO POLLEN: ICD-10-CM

## 2018-05-16 PROBLEM — M72.2 PLANTAR FASCIITIS, LEFT: Status: ACTIVE | Noted: 2017-03-09

## 2018-05-16 PROBLEM — J30.9 ALLERGIC RHINITIS: Status: ACTIVE | Noted: 2017-05-17

## 2018-05-16 PROBLEM — M72.2 PLANTAR FASCIITIS, LEFT: Status: RESOLVED | Noted: 2017-03-09 | Resolved: 2018-05-16

## 2018-05-16 PROBLEM — R31.9 HEMATURIA: Status: ACTIVE | Noted: 2017-05-23

## 2018-05-16 PROCEDURE — 99214 OFFICE O/P EST MOD 30 MIN: CPT | Performed by: FAMILY MEDICINE

## 2018-05-16 PROCEDURE — 3008F BODY MASS INDEX DOCD: CPT | Performed by: FAMILY MEDICINE

## 2018-05-16 RX ORDER — MONTELUKAST SODIUM 10 MG/1
10 TABLET ORAL DAILY
Qty: 90 TABLET | Refills: 1 | Status: SHIPPED | OUTPATIENT
Start: 2018-05-16 | End: 2019-05-10 | Stop reason: SDUPTHER

## 2018-05-16 NOTE — PROGRESS NOTES
Assessment/Plan:    Problem List Items Addressed This Visit        Endocrine    Impaired fasting blood sugar       She has slightly borderline high blood sugar, discussed with her to cut down carbs and sugar in her diet and she should work out regularly         Relevant Orders    Comprehensive metabolic panel       Respiratory    Allergic rhinitis    Relevant Medications    montelukast (SINGULAIR) 10 mg tablet    Asthma, mild intermittent - Primary      She has mild intermittent asthma which has been stable has not used her inhaler in a long time         Relevant Medications    montelukast (SINGULAIR) 10 mg tablet       Other    Hyperlipidemia     Lab Results   Component Value Date    LDLCALC 159 (H) 05/10/2018    in last 3 times her LDL has been progressively getting worse discussed with her about starting statins but she is reluctant to start any medication she still wants to try more with her diet  Relevant Orders    CBC and differential    Lipid panel      Other Visit Diagnoses     Visit for screening mammogram        Relevant Orders    Mammo screening bilateral w cad          Chief Complaint   Patient presents with    Follow-up       Subjective:   Patient ID: Gema Goodwin is a 50 y o  female  She says that her allergy has been getting worse she has watery eyes sneezing and postnasal drip with dry cough but she is not taking any medication  She needs the prescription for Singulair which was working in the past     Her asthma has been stable she still has inhaler from last year she has not used it in a long time  She had her labs done and she has progressively worsening of her cholesterol but she does not want to take any medication  She says she has been eating well but she use rice excessively in her diet  She is nonsmoker and no family history of heart disease  Her last Pap smear was done 3 years ago which was normal and she has no mammogram recently          Review of Systems Constitutional: Negative for activity change, appetite change, chills, diaphoresis, fatigue, fever and unexpected weight change  HENT: Positive for congestion, postnasal drip and rhinorrhea  Negative for dental problem, ear discharge, ear pain, facial swelling, hearing loss, mouth sores, nosebleeds, sinus pain, sinus pressure, sneezing, sore throat, trouble swallowing and voice change  Eyes: Negative for photophobia, pain, discharge, redness and itching  Watery eye   Respiratory: Negative for cough, chest tightness, shortness of breath and wheezing  Cardiovascular: Negative for chest pain, palpitations and leg swelling  Gastrointestinal: Negative for abdominal distention, abdominal pain, blood in stool, constipation, diarrhea and nausea  Endocrine: Negative for cold intolerance, heat intolerance, polydipsia, polyphagia and polyuria  Genitourinary: Negative for dysuria, flank pain, frequency, hematuria and urgency  Musculoskeletal: Negative for arthralgias, back pain, myalgias and neck pain  Skin: Negative for color change and pallor  Allergic/Immunologic: Negative for environmental allergies and food allergies  Neurological: Negative for dizziness, weakness, light-headedness, numbness and headaches  Hematological: Negative for adenopathy  Does not bruise/bleed easily  Psychiatric/Behavioral: Negative for behavioral problems, sleep disturbance and suicidal ideas  The patient is not nervous/anxious  Objective:  Physical Exam   Constitutional: She appears well-developed and well-nourished  HENT:   Head: Normocephalic and atraumatic  Mouth/Throat: Oropharynx is clear and moist    Eyes: EOM are normal  Pupils are equal, round, and reactive to light  No scleral icterus  Neck: Normal range of motion  Neck supple  No thyromegaly present  Cardiovascular: Normal rate, regular rhythm and normal heart sounds      Pulmonary/Chest: Effort normal and breath sounds normal  She has no wheezes  She has no rales  Musculoskeletal: Normal range of motion  Lymphadenopathy:     She has no cervical adenopathy  Neurological: She is alert  Skin: No rash noted  No erythema  Psychiatric: She has a normal mood and affect  Nursing note and vitals reviewed  Past Surgical History:   Procedure Laterality Date    CYSTOSCOPY      diagnostic,last assessed 10/17/17    OTHER SURGICAL HISTORY      suprapublic sling       No family history on file        Current Outpatient Prescriptions:     albuterol (PROAIR HFA) 90 mcg/act inhaler, Inhale 2 puffs every 4 (four) hours as needed, Disp: , Rfl:     montelukast (SINGULAIR) 10 mg tablet, Take 1 tablet (10 mg total) by mouth daily, Disp: 90 tablet, Rfl: 1    Allergies   Allergen Reactions    Pollen Extract        Vitals:    05/16/18 0808   BP: 110/70   Pulse: 71   Resp: 16   SpO2: 97%   Weight: 66 5 kg (146 lb 9 6 oz)   Height: 5' 3" (1 6 m)

## 2018-05-16 NOTE — ASSESSMENT & PLAN NOTE
Lab Results   Component Value Date    LDLCALC 159 (H) 05/10/2018    in last 3 times her LDL has been progressively getting worse discussed with her about starting statins but she is reluctant to start any medication she still wants to try more with her diet

## 2018-05-16 NOTE — ASSESSMENT & PLAN NOTE
She has slightly borderline high blood sugar, discussed with her to cut down carbs and sugar in her diet and she should work out regularly

## 2018-10-17 ENCOUNTER — APPOINTMENT (OUTPATIENT)
Dept: LAB | Facility: CLINIC | Age: 48
End: 2018-10-17
Payer: COMMERCIAL

## 2018-10-17 ENCOUNTER — TRANSCRIBE ORDERS (OUTPATIENT)
Dept: LAB | Facility: CLINIC | Age: 48
End: 2018-10-17

## 2018-10-17 DIAGNOSIS — R31.9 HEMATURIA: ICD-10-CM

## 2018-10-17 DIAGNOSIS — R31.9 HEMATURIA SYNDROME: ICD-10-CM

## 2018-10-17 DIAGNOSIS — R30.0 DYSURIA: ICD-10-CM

## 2018-10-17 DIAGNOSIS — R30.0 DYSURIA: Primary | ICD-10-CM

## 2018-10-17 LAB
BILIRUB UR QL STRIP: NEGATIVE
CLARITY UR: CLEAR
COLOR UR: YELLOW
GLUCOSE UR STRIP-MCNC: NEGATIVE MG/DL
HGB UR QL STRIP.AUTO: NEGATIVE
KETONES UR STRIP-MCNC: NEGATIVE MG/DL
LEUKOCYTE ESTERASE UR QL STRIP: NEGATIVE
NITRITE UR QL STRIP: NEGATIVE
PH UR STRIP.AUTO: 7 [PH] (ref 4.5–8)
PROT UR STRIP-MCNC: NEGATIVE MG/DL
SP GR UR STRIP.AUTO: 1.01 (ref 1–1.03)
UROBILINOGEN UR QL STRIP.AUTO: 0.2 E.U./DL

## 2018-10-17 PROCEDURE — 81003 URINALYSIS AUTO W/O SCOPE: CPT

## 2018-10-17 PROCEDURE — 88112 CYTOPATH CELL ENHANCE TECH: CPT | Performed by: PATHOLOGY

## 2018-10-22 NOTE — PROGRESS NOTES
10/25/2018    Clay V Le  1970  [id number]    Discussion and Plan    Dietary and hydration recommendations provided help minimize bladder irritants  Postvoid residual is 44 cc  Advised continued observation  Risks and benefits of anticholinergics were briefly discussed should this become necessary in the future  She will otherwise return in 1 year with urinalysis prior to visit  All questions answered at this time  1  Urge incontinence  - POCT Measure PVR    2  Asymptomatic microscopic hematuria  - Urinalysis with microscopic; Future  - POCT Measure PVR; Future    Assessment      Patient Active Problem List   Diagnosis    Allergic rhinitis    Asthma, mild intermittent    Hematuria    Hyperlipidemia    Hypocalcemia    Impaired fasting blood sugar       History of Present Illness    Clay is a 46 y o  female seen today in regards to a history of urinary frequency and hematuria  Symptoms initially began approximately 3-4 months ago  Was treated with a course of antibiotics  Urinary frequency however persists  She denies any flank pain  Has had a significant history of a pelvic surgery with mesh  This was performed approximately 6-7 years ago  Notes good flow with complete bladder emptying sensation daytime urgency and nocturia ×5-6  CT scan shows no upper tract abnormalities  Bladder wall thickening is noted  Cystoscopy in 2017 was otherwise normal   Repeat urinalysis is completely clear  She denies any interval urinary complaints other than sporadic urgency  No hematuria urinary tract infections since the time of her last visit  Urinary Symptom Assessment        Past Medical History  Past Medical History:   Diagnosis Date    Leukocytosis     last assessed 7/31/17       Past Social History  Past Surgical History:   Procedure Laterality Date    CYSTOSCOPY      diagnostic,last assessed 10/17/17    OTHER SURGICAL HISTORY      suprapublic sling       Past Family History  History reviewed   No pertinent family history  Past Social history  Social History     Social History    Marital status: Single     Spouse name: N/A    Number of children: 4    Years of education: N/A     Occupational History    Not on file  Social History Main Topics    Smoking status: Never Smoker    Smokeless tobacco: Never Used    Alcohol use Yes      Comment: social    Drug use: No    Sexual activity: Not on file     Other Topics Concern    Not on file     Social History Narrative    No narrative on file       Current Medications  Current Outpatient Prescriptions   Medication Sig Dispense Refill    albuterol (PROAIR HFA) 90 mcg/act inhaler Inhale 2 puffs every 4 (four) hours as needed      montelukast (SINGULAIR) 10 mg tablet Take 1 tablet (10 mg total) by mouth daily 90 tablet 1     No current facility-administered medications for this visit  Allergies  Allergies   Allergen Reactions    Pollen Extract        Past Medical History, Social History, Family History, medications and allergies were reviewed  Review of Systems  Review of Systems   Constitutional: Negative  HENT: Negative  Eyes: Negative  Respiratory: Negative  Cardiovascular: Negative  Gastrointestinal: Negative  Endocrine: Negative  Genitourinary: Negative for difficulty urinating, hematuria and urgency  Musculoskeletal: Negative  Skin: Negative  Allergic/Immunologic: Negative  Neurological: Negative  Hematological: Negative  Psychiatric/Behavioral: Negative  Vitals  Vitals:    10/25/18 0827   BP: 116/62   BP Location: Left arm   Patient Position: Sitting   Cuff Size: Standard   Pulse: 64   Weight: 66 7 kg (147 lb)   Height: 5' 3" (1 6 m)         Physical Exam    Physical Exam   Constitutional: She is oriented to person, place, and time  She appears well-developed and well-nourished  HENT:   Head: Normocephalic and atraumatic  Eyes: Pupils are equal, round, and reactive to light     Neck: Normal range of motion  Cardiovascular: Normal rate, regular rhythm and normal heart sounds  Pulmonary/Chest: Effort normal and breath sounds normal    Abdominal: Soft  Bowel sounds are normal  She exhibits no distension  There is no tenderness  There is no rebound and no guarding  Musculoskeletal: Normal range of motion  Neurological: She is alert and oriented to person, place, and time  Skin: Skin is warm, dry and intact  Psychiatric: She has a normal mood and affect  Nursing note and vitals reviewed        Results    Below listed labs, pathology results, and radiology images were personally reviewed:    No results found for: PSA  Lab Results   Component Value Date    CALCIUM 8 7 05/10/2018     05/10/2018    K 3 6 05/10/2018    CO2 32 05/10/2018     05/10/2018    BUN 11 05/10/2018    CREATININE 0 61 05/10/2018     Lab Results   Component Value Date    WBC 5 25 05/10/2018    HGB 15 0 05/10/2018    HCT 44 6 05/10/2018    MCV 88 05/10/2018     05/10/2018       Recent Results (from the past 1 hour(s))   POCT Measure PVR    Collection Time: 10/25/18  8:35 AM   Result Value Ref Range    POST-VOID RESIDUAL VOLUME, ML POC 44 mL   ]    Component      Latest Ref Rng & Units 3/9/2017          10:09 AM   SL AMB SPECIFIC GRAVITY-URINE      1 003 - 1 030 1 010   Bilirubin, UA      Negative NEG   Glucose       NORM   Color, UA       Yellow   Comment      CLEAR    pH, UA      4 5 - 8 0 7   Ketones, UA      Negative mg/dl NEG   FECAL OCCULT BLOOD DIAGNOSTIC      NEGATIVE    Protein, UA      Negative mg/dl NEG   SQUAMOUS EPITHELIAL CELLS (HISTORICAL)      < OR = 5 /HPF    Hyaline Casts, UA      NONE SEEN /LPF    CULTURE RESULT          RBC      < OR = 2 /HPF    Nitrite, UA      Negative NEG   Leukocytes, UA      Negative 500   WBC      < OR = 5 /HPF    Bacteria, UA      NONE SEEN /HPF    Clarity, UA       Transparent   Blood, UA      Negative 250   Urobilinogen, UA      0 2, 1 0 E U /dl E U /dl NORM Glucose, UA      Negative mg/dl      Component      Latest Ref Rng & Units 3/9/2017          10:15 AM    AMB SPECIFIC GRAVITY-URINE      1 003 - 1 030 1 019   Bilirubin, UA      Negative NEGATIVE   Glucose       NEGATIVE   Color, UA       YELLOW   Comment      CLEAR CLEAR   pH, UA      4 5 - 8 0 7 0   Ketones, UA      Negative mg/dl NEGATIVE   FECAL OCCULT BLOOD DIAGNOSTIC      NEGATIVE 3+ (A)   Protein, UA      Negative mg/dl TRACE (A)   SQUAMOUS EPITHELIAL CELLS (HISTORICAL)      < OR = 5 /HPF NONE SEEN   Hyaline Casts, UA      NONE SEEN /LPF 0-5 (A)   CULTURE RESULT       CULTURE INDICATED - RESULTS TO FOLLOW   RBC      < OR = 2 /HPF 40-60 (A)   Nitrite, UA      Negative POSITIVE (A)   Leukocytes, UA      Negative 3+ (A)   WBC      < OR = 5 /HPF > OR = 60 (A)   Bacteria, UA      NONE SEEN /HPF FEW (A)   Clarity, UA          Blood, UA      Negative    Urobilinogen, UA      0 2, 1 0 E U /dl E U /dl    Glucose, UA      Negative mg/dl      Component      Latest Ref Rng & Units 5/19/2017          12:00 AM   SL AMB SPECIFIC GRAVITY-URINE      1 003 - 1 030 1 010   Bilirubin, UA      Negative neg   Glucose       normal   Color, UA       Yellow   Comment      CLEAR    pH, UA      4 5 - 8 0 7   Ketones, UA      Negative mg/dl neg   FECAL OCCULT BLOOD DIAGNOSTIC      NEGATIVE    Protein, UA      Negative mg/dl neg   SQUAMOUS EPITHELIAL CELLS (HISTORICAL)      < OR = 5 /HPF    Hyaline Casts, UA      NONE SEEN /LPF    CULTURE RESULT          RBC      < OR = 2 /HPF    Nitrite, UA      Negative neg   Leukocytes, UA      Negative neg   WBC      < OR = 5 /HPF    Bacteria, UA      NONE SEEN /HPF    Clarity, UA       Hazy   Blood, UA      Negative 250   Urobilinogen, UA      0 2, 1 0 E U /dl E U /dl normal   Glucose, UA      Negative mg/dl      Component      Latest Ref Rng & Units 5/19/2017          12:00 AM    AMB SPECIFIC GRAVITY-URINE      1 003 - 1 030 1 006   Bilirubin, UA      Negative NEGATIVE   Glucose NEGATIVE   Color, UA       YELLOW   Comment      CLEAR CLEAR   pH, UA      4 5 - 8 0 7 0   Ketones, UA      Negative mg/dl NEGATIVE   FECAL OCCULT BLOOD DIAGNOSTIC      NEGATIVE 2+ (A)   Protein, UA      Negative mg/dl NEGATIVE   SQUAMOUS EPITHELIAL CELLS (HISTORICAL)      < OR = 5 /HPF 0-5   Hyaline Casts, UA      NONE SEEN /LPF NONE SEEN   CULTURE RESULT       CULTURE INDICATED - RESULTS TO FOLLOW   RBC      < OR = 2 /HPF 0-2   Nitrite, UA      Negative NEGATIVE   Leukocytes, UA      Negative 1+ (A)   WBC      < OR = 5 /HPF 0-5   Bacteria, UA      NONE SEEN /HPF NONE SEEN   Clarity, UA          Blood, UA      Negative    Urobilinogen, UA      0 2, 1 0 E U /dl E U /dl    Glucose, UA      Negative mg/dl      Component      Latest Ref Rng & Units 6/15/2017 9/21/2017              SL AMB SPECIFIC GRAVITY-URINE      1 003 - 1 030 1 005 1 005   Bilirubin, UA      Negative NEG    Glucose       NEG -   Color, UA       Yellow Clear   Comment      CLEAR     pH, UA      4 5 - 8 0 5 7 5   Ketones, UA      Negative mg/dl NEG -   FECAL OCCULT BLOOD DIAGNOSTIC      NEGATIVE     Protein, UA      Negative mg/dl NEG -   SQUAMOUS EPITHELIAL CELLS (HISTORICAL)      < OR = 5 /HPF     Hyaline Casts, UA      NONE SEEN /LPF     CULTURE RESULT           RBC      < OR = 2 /HPF     Nitrite, UA      Negative NEG -   Leukocytes, UA      Negative NEG trace   WBC      < OR = 5 /HPF     Bacteria, UA      NONE SEEN /HPF     Clarity, UA       Transparent Transparent   Blood, UA      Negative 250 -   Urobilinogen, UA      0 2, 1 0 E U /dl E U /dl NEG    Glucose, UA      Negative mg/dl       Component      Latest Ref Rng & Units 10/17/2017 11/17/2017              SL AMB SPECIFIC GRAVITY-URINE      1 003 - 1 030 1 010 1 010   Bilirubin, UA      Negative  NEG   Glucose       - NEG   Color, UA       Yellow Yellow   Comment      CLEAR     pH, UA      4 5 - 8 0 6 0 7   Ketones, UA      Negative mg/dl - NEG   FECAL OCCULT BLOOD DIAGNOSTIC      NEGATIVE Protein, UA      Negative mg/dl - NEG   SQUAMOUS EPITHELIAL CELLS (HISTORICAL)      < OR = 5 /HPF     Hyaline Casts, UA      NONE SEEN /LPF     CULTURE RESULT           RBC      < OR = 2 /HPF     Nitrite, UA      Negative - NEG   Leukocytes, UA      Negative - NEG   WBC      < OR = 5 /HPF     Bacteria, UA      NONE SEEN /HPF     Clarity, UA       Transparent Transparent   Blood, UA      Negative trace 50   Urobilinogen, UA      0 2, 1 0 E U /dl E U /dl  NEG   Glucose, UA      Negative mg/dl       Component      Latest Ref Rng & Units 10/17/2018             SL AMB SPECIFIC GRAVITY-URINE      1 003 - 1 030 1 010   Bilirubin, UA      Negative Negative   Glucose          Color, UA       Yellow   Comment      CLEAR    pH, UA      4 5 - 8 0 7 0   Ketones, UA      Negative mg/dl Negative   FECAL OCCULT BLOOD DIAGNOSTIC      NEGATIVE    Protein, UA      Negative mg/dl Negative   SQUAMOUS EPITHELIAL CELLS (HISTORICAL)      < OR = 5 /HPF    Hyaline Casts, UA      NONE SEEN /LPF    CULTURE RESULT          RBC      < OR = 2 /HPF    Nitrite, UA      Negative Negative   Leukocytes, UA      Negative Negative   WBC      < OR = 5 /HPF    Bacteria, UA      NONE SEEN /HPF    Clarity, UA       Clear   Blood, UA      Negative Negative   Urobilinogen, UA      0 2, 1 0 E U /dl E U /dl 0 2   Glucose, UA      Negative mg/dl Negative

## 2018-10-25 ENCOUNTER — OFFICE VISIT (OUTPATIENT)
Dept: UROLOGY | Facility: CLINIC | Age: 48
End: 2018-10-25
Payer: COMMERCIAL

## 2018-10-25 VITALS
BODY MASS INDEX: 26.05 KG/M2 | DIASTOLIC BLOOD PRESSURE: 62 MMHG | HEART RATE: 64 BPM | SYSTOLIC BLOOD PRESSURE: 116 MMHG | WEIGHT: 147 LBS | HEIGHT: 63 IN

## 2018-10-25 DIAGNOSIS — N39.41 URGE INCONTINENCE: Primary | ICD-10-CM

## 2018-10-25 DIAGNOSIS — R31.21 ASYMPTOMATIC MICROSCOPIC HEMATURIA: ICD-10-CM

## 2018-10-25 LAB — POST-VOID RESIDUAL VOLUME, ML POC: 44 ML

## 2018-10-25 PROCEDURE — 51798 US URINE CAPACITY MEASURE: CPT | Performed by: UROLOGY

## 2018-10-25 PROCEDURE — 99214 OFFICE O/P EST MOD 30 MIN: CPT | Performed by: UROLOGY

## 2018-11-16 ENCOUNTER — APPOINTMENT (OUTPATIENT)
Dept: LAB | Facility: CLINIC | Age: 48
End: 2018-11-16
Payer: COMMERCIAL

## 2018-11-16 DIAGNOSIS — E78.2 MIXED HYPERLIPIDEMIA: ICD-10-CM

## 2018-11-16 DIAGNOSIS — R73.01 IMPAIRED FASTING BLOOD SUGAR: ICD-10-CM

## 2018-11-16 LAB
ALBUMIN SERPL BCP-MCNC: 3.9 G/DL (ref 3.5–5)
ALP SERPL-CCNC: 74 U/L (ref 46–116)
ALT SERPL W P-5'-P-CCNC: 38 U/L (ref 12–78)
ANION GAP SERPL CALCULATED.3IONS-SCNC: 8 MMOL/L (ref 4–13)
AST SERPL W P-5'-P-CCNC: 22 U/L (ref 5–45)
BASOPHILS # BLD AUTO: 0.04 THOUSANDS/ΜL (ref 0–0.1)
BASOPHILS NFR BLD AUTO: 1 % (ref 0–1)
BILIRUB SERPL-MCNC: 0.6 MG/DL (ref 0.2–1)
BUN SERPL-MCNC: 11 MG/DL (ref 5–25)
CALCIUM SERPL-MCNC: 9.1 MG/DL (ref 8.3–10.1)
CHLORIDE SERPL-SCNC: 104 MMOL/L (ref 100–108)
CHOLEST SERPL-MCNC: 236 MG/DL (ref 50–200)
CO2 SERPL-SCNC: 30 MMOL/L (ref 21–32)
CREAT SERPL-MCNC: 0.74 MG/DL (ref 0.6–1.3)
EOSINOPHIL # BLD AUTO: 0.21 THOUSAND/ΜL (ref 0–0.61)
EOSINOPHIL NFR BLD AUTO: 4 % (ref 0–6)
ERYTHROCYTE [DISTWIDTH] IN BLOOD BY AUTOMATED COUNT: 11.9 % (ref 11.6–15.1)
GFR SERPL CREATININE-BSD FRML MDRD: 95 ML/MIN/1.73SQ M
GLUCOSE P FAST SERPL-MCNC: 97 MG/DL (ref 65–99)
HCT VFR BLD AUTO: 48 % (ref 34.8–46.1)
HDLC SERPL-MCNC: 64 MG/DL (ref 40–60)
HGB BLD-MCNC: 15.5 G/DL (ref 11.5–15.4)
IMM GRANULOCYTES # BLD AUTO: 0 THOUSAND/UL (ref 0–0.2)
IMM GRANULOCYTES NFR BLD AUTO: 0 % (ref 0–2)
LDLC SERPL CALC-MCNC: 156 MG/DL (ref 0–100)
LYMPHOCYTES # BLD AUTO: 2.41 THOUSANDS/ΜL (ref 0.6–4.47)
LYMPHOCYTES NFR BLD AUTO: 42 % (ref 14–44)
MCH RBC QN AUTO: 29.5 PG (ref 26.8–34.3)
MCHC RBC AUTO-ENTMCNC: 32.3 G/DL (ref 31.4–37.4)
MCV RBC AUTO: 91 FL (ref 82–98)
MONOCYTES # BLD AUTO: 0.37 THOUSAND/ΜL (ref 0.17–1.22)
MONOCYTES NFR BLD AUTO: 6 % (ref 4–12)
NEUTROPHILS # BLD AUTO: 2.74 THOUSANDS/ΜL (ref 1.85–7.62)
NEUTS SEG NFR BLD AUTO: 47 % (ref 43–75)
NONHDLC SERPL-MCNC: 172 MG/DL
NRBC BLD AUTO-RTO: 0 /100 WBCS
PLATELET # BLD AUTO: 220 THOUSANDS/UL (ref 149–390)
PMV BLD AUTO: 10 FL (ref 8.9–12.7)
POTASSIUM SERPL-SCNC: 3.5 MMOL/L (ref 3.5–5.3)
PROT SERPL-MCNC: 7.7 G/DL (ref 6.4–8.2)
RBC # BLD AUTO: 5.26 MILLION/UL (ref 3.81–5.12)
SODIUM SERPL-SCNC: 142 MMOL/L (ref 136–145)
TRIGL SERPL-MCNC: 82 MG/DL
WBC # BLD AUTO: 5.77 THOUSAND/UL (ref 4.31–10.16)

## 2018-11-16 PROCEDURE — 36415 COLL VENOUS BLD VENIPUNCTURE: CPT

## 2018-11-16 PROCEDURE — 80061 LIPID PANEL: CPT

## 2018-11-16 PROCEDURE — 85025 COMPLETE CBC W/AUTO DIFF WBC: CPT

## 2018-11-16 PROCEDURE — 80053 COMPREHEN METABOLIC PANEL: CPT

## 2018-11-27 ENCOUNTER — OFFICE VISIT (OUTPATIENT)
Dept: FAMILY MEDICINE CLINIC | Facility: CLINIC | Age: 48
End: 2018-11-27
Payer: COMMERCIAL

## 2018-11-27 VITALS
HEART RATE: 72 BPM | SYSTOLIC BLOOD PRESSURE: 106 MMHG | RESPIRATION RATE: 16 BRPM | WEIGHT: 145 LBS | DIASTOLIC BLOOD PRESSURE: 72 MMHG | BODY MASS INDEX: 25.69 KG/M2 | HEIGHT: 63 IN

## 2018-11-27 DIAGNOSIS — Z13.6 SCREENING FOR CARDIOVASCULAR CONDITION: ICD-10-CM

## 2018-11-27 DIAGNOSIS — Z12.11 SCREENING FOR COLON CANCER: ICD-10-CM

## 2018-11-27 DIAGNOSIS — E78.2 MIXED HYPERLIPIDEMIA: Primary | ICD-10-CM

## 2018-11-27 DIAGNOSIS — Z23 NEED FOR INFLUENZA VACCINATION: ICD-10-CM

## 2018-11-27 DIAGNOSIS — J30.1 SEASONAL ALLERGIC RHINITIS DUE TO POLLEN: ICD-10-CM

## 2018-11-27 PROBLEM — R73.01 IMPAIRED FASTING BLOOD SUGAR: Status: RESOLVED | Noted: 2018-05-16 | Resolved: 2018-11-27

## 2018-11-27 PROCEDURE — 3008F BODY MASS INDEX DOCD: CPT | Performed by: FAMILY MEDICINE

## 2018-11-27 PROCEDURE — 90682 RIV4 VACC RECOMBINANT DNA IM: CPT

## 2018-11-27 PROCEDURE — 99214 OFFICE O/P EST MOD 30 MIN: CPT | Performed by: FAMILY MEDICINE

## 2018-11-27 PROCEDURE — 90471 IMMUNIZATION ADMIN: CPT

## 2018-11-27 NOTE — ASSESSMENT & PLAN NOTE
Lab Results   Component Value Date    LDLCALC 156 (H) 11/16/2018    Discussed with her and with her , regarding her cholesterol not being well controlled, even though she has good H DL her LDL has not been improving and her total cholesterol went up, she is nonsmoker  She has family history of heart disease, she is advised to start the statins but she is concerned about side effects so she does not want to take the medication  And she refused for getting blood work in 6 month because she does not want to keep going through this discussion    She says she will come back after 1 year to get the labs done and her physical and Gyne exam  In the meantime she will work on her diet and exercise if possible

## 2018-11-27 NOTE — ASSESSMENT & PLAN NOTE
Labs ordered for next year and advised to get colonoscopy and mammogram, patient is not interested in colonoscopy

## 2018-11-27 NOTE — PROGRESS NOTES
Assessment/Plan:    Problem List Items Addressed This Visit        Respiratory    Allergic rhinitis     She has only seasonal allergies and she takes Singulair during that time  Other    Hyperlipidemia - Primary     Lab Results   Component Value Date    LDLCALC 156 (H) 11/16/2018    Discussed with her and with her , regarding her cholesterol not being well controlled, even though she has good H DL her LDL has not been improving and her total cholesterol went up, she is nonsmoker  She has family history of heart disease, she is advised to start the statins but she is concerned about side effects so she does not want to take the medication  And she refused for getting blood work in 6 month because she does not want to keep going through this discussion  She says she will come back after 1 year to get the labs done and her physical and Gyne exam  In the meantime she will work on her diet and exercise if possible         Relevant Orders    CBC and differential    Comprehensive metabolic panel    Lipid panel    TSH, 3rd generation    Need for influenza vaccination     Influenza vaccine is given         Relevant Orders    influenza vaccine, [id number], quadrivalent, recombinant, PF, 0 5 mL, for patients 18 yr+ (FLUBLOK) (Completed)    Screening for cardiovascular condition     Labs ordered for next year and advised to get colonoscopy and mammogram, patient is not interested in colonoscopy         Relevant Orders    CBC and differential    Comprehensive metabolic panel    Lipid panel          Chief Complaint   Patient presents with    Follow-up       Subjective:   Patient ID: Blu Carlson is a 46 y o  female      She is here for follow-up on her labs, she says that she works 12 hour every day 6 days per week and she does not get any time to do any exercise she tries to eat low-fat diet, she is having high cholesterol going on for long time, and she does not want to take any medications, she does not smoke, she has 1 brother who had heart attack in late 45s as he was smoker and obese,  She denies any headache chest pain shortness of breath any abdominal pain leg swelling  She wants the flu vaccine  She does not want to go for colonoscopy and she also did not go for mammogram   Order was given last time  She has seasonal allergies only during summer she takes montelukast, she does not want to take it now as her allergies are under control and she did not need any inhaler in last few months  Review of Systems   Constitutional: Negative for activity change, appetite change, chills, diaphoresis, fatigue, fever and unexpected weight change  HENT: Negative for congestion, dental problem, ear discharge, ear pain, facial swelling, hearing loss, mouth sores, nosebleeds, postnasal drip, rhinorrhea, sinus pain, sinus pressure, sneezing, sore throat, trouble swallowing and voice change  Eyes: Negative for photophobia, pain, discharge, redness and itching  Respiratory: Negative for cough, chest tightness, shortness of breath and wheezing  Cardiovascular: Negative for chest pain, palpitations and leg swelling  Gastrointestinal: Negative for abdominal distention, abdominal pain, blood in stool, constipation, diarrhea and nausea  Endocrine: Negative for cold intolerance, heat intolerance, polydipsia, polyphagia and polyuria  Genitourinary: Negative for dysuria, flank pain, frequency, hematuria and urgency  Musculoskeletal: Negative for arthralgias, back pain, myalgias and neck pain  Skin: Negative for color change and pallor  Allergic/Immunologic: Negative for environmental allergies and food allergies  Neurological: Negative for dizziness, weakness, light-headedness, numbness and headaches  Hematological: Negative for adenopathy  Does not bruise/bleed easily  Psychiatric/Behavioral: Negative for behavioral problems, sleep disturbance and suicidal ideas  The patient is not nervous/anxious  Objective:  Physical Exam   Constitutional: She is oriented to person, place, and time  She appears well-developed and well-nourished  HENT:   Head: Normocephalic and atraumatic  Nose: Nose normal    Mouth/Throat: Oropharynx is clear and moist  No oropharyngeal exudate  Eyes: Pupils are equal, round, and reactive to light  Conjunctivae and EOM are normal  Right eye exhibits no discharge  Left eye exhibits no discharge  No scleral icterus  Neck: Normal range of motion  Neck supple  No tracheal deviation present  No thyromegaly present  Cardiovascular: Normal rate, regular rhythm and normal heart sounds  No murmur heard  Pulmonary/Chest: Effort normal and breath sounds normal  No respiratory distress  She has no wheezes  She has no rales  Abdominal: Soft  Bowel sounds are normal  She exhibits no distension and no mass  There is no tenderness  There is no rebound  Musculoskeletal: Normal range of motion  She exhibits no edema  Lymphadenopathy:     She has no cervical adenopathy  Neurological: She is alert and oriented to person, place, and time  She has normal reflexes  No cranial nerve deficit  Skin: Skin is warm  No rash noted  No erythema  No pallor  Psychiatric: She has a normal mood and affect  Her behavior is normal  Judgment and thought content normal    Nursing note and vitals reviewed  Past Surgical History:   Procedure Laterality Date    CYSTOSCOPY      diagnostic,last assessed 10/17/17    OTHER SURGICAL HISTORY      suprapublic sling       No family history on file        Current Outpatient Prescriptions:     albuterol (PROAIR HFA) 90 mcg/act inhaler, Inhale 2 puffs every 4 (four) hours as needed, Disp: , Rfl:     montelukast (SINGULAIR) 10 mg tablet, Take 1 tablet (10 mg total) by mouth daily, Disp: 90 tablet, Rfl: 1    Allergies   Allergen Reactions    Pollen Extract        Vitals:    11/27/18 0752   BP: 106/72   Pulse: 72   Resp: 16   Weight: 65 8 kg (145 lb) Height: 5' 3" (1 6 m)

## 2019-05-10 DIAGNOSIS — J45.20 MILD INTERMITTENT ASTHMA WITHOUT COMPLICATION: ICD-10-CM

## 2019-05-10 DIAGNOSIS — J30.1 SEASONAL ALLERGIC RHINITIS DUE TO POLLEN: ICD-10-CM

## 2019-05-12 RX ORDER — MONTELUKAST SODIUM 10 MG/1
10 TABLET ORAL DAILY
Qty: 90 TABLET | Refills: 1 | Status: SHIPPED | OUTPATIENT
Start: 2019-05-12 | End: 2020-06-01 | Stop reason: SDUPTHER

## 2019-05-12 RX ORDER — ALBUTEROL SULFATE 90 UG/1
2 AEROSOL, METERED RESPIRATORY (INHALATION) EVERY 4 HOURS PRN
Qty: 1 EACH | Refills: 0 | Status: SHIPPED | OUTPATIENT
Start: 2019-05-12 | End: 2022-02-28 | Stop reason: SDUPTHER

## 2019-10-21 ENCOUNTER — TRANSCRIBE ORDERS (OUTPATIENT)
Dept: LAB | Facility: CLINIC | Age: 49
End: 2019-10-21

## 2019-10-21 ENCOUNTER — APPOINTMENT (OUTPATIENT)
Dept: LAB | Facility: CLINIC | Age: 49
End: 2019-10-21
Payer: COMMERCIAL

## 2019-10-21 DIAGNOSIS — R31.21 ASYMPTOMATIC MICROSCOPIC HEMATURIA: Primary | ICD-10-CM

## 2019-10-21 LAB
BACTERIA UR QL AUTO: NORMAL /HPF
BILIRUB UR QL STRIP: NEGATIVE
CLARITY UR: CLEAR
COLOR UR: YELLOW
GLUCOSE UR STRIP-MCNC: NEGATIVE MG/DL
HGB UR QL STRIP.AUTO: NEGATIVE
KETONES UR STRIP-MCNC: NEGATIVE MG/DL
LEUKOCYTE ESTERASE UR QL STRIP: NEGATIVE
NITRITE UR QL STRIP: NEGATIVE
NON-SQ EPI CELLS URNS QL MICRO: NORMAL /HPF
PH UR STRIP.AUTO: 6 [PH]
PROT UR STRIP-MCNC: NEGATIVE MG/DL
RBC #/AREA URNS AUTO: NORMAL /HPF
SP GR UR STRIP.AUTO: 1.02 (ref 1–1.03)
UROBILINOGEN UR QL STRIP.AUTO: 0.2 E.U./DL
WBC #/AREA URNS AUTO: NORMAL /HPF

## 2019-10-21 PROCEDURE — 81001 URINALYSIS AUTO W/SCOPE: CPT | Performed by: UROLOGY

## 2019-10-28 NOTE — PROGRESS NOTES
Assessment and plan:       1  Urge incontinence  - Patient previously tried anticholinergic which caused significant dry mouth  We did discuss the potential addition of the about this and let us know if she would like to try this medication  We did discuss that these have the same side effects but on a lesser scale than anticholinergics  2  Microscopic hematuria  - Completed negative workup in 2017  - Urine continues to be negative for blood  - She will return in 1 year for symptom reassessment with urinalysis prior Chani Toro PA-C      Chief Complaint     Chief Complaint   Patient presents with    urge incontinenece    Microhematuria     resolved, negative work up in 2017         History of Present Illness     Clay Gudino Sender is a 44 y o  female patient known to Dr Jeanne Ruiz for a history of urge incontinence and asymptomatic microscopic hematuria  She underwent workup for hematuria in 2017 with a normal cystoscopy and CT scan  Most recently, when she was seen 1 year ago she reported urinary frequency over the last 3-4 months  She had been treated with course of antibiotics but had persistent frequency  She denied any associated flank pain  She does have a history of pelvic surgery with mesh performed approximately 7-8 years ago  She did report nocturia 5-6 times nightly  Urinalysis completed 10/21/2019 continues to be negative for red blood cells  She continues to have urinary frequency and urgency but reports he previously tried a medication for this which caused extreme dry mouth  She is not interested in further pharmacotherapy at this time for the symptoms  She does report some intermittent right lower quadrant pain  She denies suprapubic pain  She was treated in June of for urinary tract infection while in [facility]   She has otherwise been asymptomatic  She denies any episodes of gross hematuria      Laboratory     Lab Results   Component Value Date    CREATININE 0 74 11/16/2018       No results found for: PSA    No results found for this or any previous visit (from the past 1 hour(s))  Review of Systems     Review of Systems   Constitutional: Negative for chills and fever  HENT: Negative  Eyes: Negative  Respiratory: Negative for shortness of breath  Cardiovascular: Negative for chest pain  Gastrointestinal: Negative for constipation, diarrhea, nausea and vomiting  Genitourinary: Positive for frequency and urgency  Negative for difficulty urinating, dysuria, enuresis, flank pain and hematuria  Musculoskeletal: Negative  Urinary Incontinence Screening      Most Recent Value   Urinary Incontinence   Urinary Incontinence? No   Incomplete emptying? No   Urinary frequency? Yes [sometimes]   Urinary urgency? Yes   Urinary hesitancy? No   Nocturia (waking up to use the bathroom)? Yes [4-5]   Straining (having to push to go)? No   Weak stream?  No   Intermittent stream?  Yes [sometimes]                Allergies     Allergies   Allergen Reactions    Pollen Extract        Physical Exam     Physical Exam   Constitutional: She is oriented to person, place, and time  She appears well-developed and well-nourished  No distress  HENT:   Head: Normocephalic and atraumatic  Right Ear: External ear normal    Left Ear: External ear normal    Nose: Nose normal    Eyes: Right eye exhibits no discharge  Left eye exhibits no discharge  No scleral icterus  Cardiovascular: Normal rate  Pulmonary/Chest: Effort normal    Abdominal: Soft  She exhibits no distension  There is no tenderness  There is no guarding  Genitourinary:   Genitourinary Comments: Negative for CVA tenderness bilaterally   Musculoskeletal:   Ambulates independently   Neurological: She is alert and oriented to person, place, and time  Skin: Skin is warm and dry  She is not diaphoretic  Psychiatric: She has a normal mood and affect   Her behavior is normal  Judgment and thought content normal    Nursing note and vitals reviewed  Vital Signs     Vitals:    10/31/19 0837   BP: 122/84   BP Location: Right arm   Patient Position: Sitting   Cuff Size: Standard   Pulse: 78   Weight: 68 9 kg (151 lb 12 8 oz)   Height: 5' 3" (1 6 m)         Current Medications       Current Outpatient Medications:     albuterol (PROAIR HFA) 90 mcg/act inhaler, Inhale 2 puffs every 4 (four) hours as needed for shortness of breath, Disp: 1 each, Rfl: 0    montelukast (SINGULAIR) 10 mg tablet, Take 1 tablet (10 mg total) by mouth daily, Disp: 90 tablet, Rfl: 1      Active Problems     Patient Active Problem List   Diagnosis    Allergic rhinitis    Asthma, mild intermittent    Hematuria    Hyperlipidemia    Need for influenza vaccination    Screening for cardiovascular condition         Past Medical History     Past Medical History:   Diagnosis Date    Leukocytosis     last assessed 7/31/17         Surgical History     Past Surgical History:   Procedure Laterality Date    BLADDER SURGERY      bladder sling? approx 7 years    CYSTOSCOPY      diagnostic,last assessed 10/17/17    OTHER SURGICAL HISTORY      suprapublic sling         Family History     History reviewed  No pertinent family history        Social History     Social History       Radiology

## 2019-10-31 ENCOUNTER — OFFICE VISIT (OUTPATIENT)
Dept: UROLOGY | Facility: CLINIC | Age: 49
End: 2019-10-31
Payer: COMMERCIAL

## 2019-10-31 VITALS
BODY MASS INDEX: 26.9 KG/M2 | DIASTOLIC BLOOD PRESSURE: 84 MMHG | HEART RATE: 78 BPM | WEIGHT: 151.8 LBS | HEIGHT: 63 IN | SYSTOLIC BLOOD PRESSURE: 122 MMHG

## 2019-10-31 DIAGNOSIS — R31.21 ASYMPTOMATIC MICROSCOPIC HEMATURIA: Primary | ICD-10-CM

## 2019-10-31 PROCEDURE — 99213 OFFICE O/P EST LOW 20 MIN: CPT | Performed by: PHYSICIAN ASSISTANT

## 2019-11-19 ENCOUNTER — TELEPHONE (OUTPATIENT)
Dept: UROLOGY | Facility: CLINIC | Age: 49
End: 2019-11-19

## 2019-11-19 ENCOUNTER — APPOINTMENT (OUTPATIENT)
Dept: LAB | Facility: CLINIC | Age: 49
End: 2019-11-19
Payer: COMMERCIAL

## 2019-11-19 DIAGNOSIS — Z13.6 SCREENING FOR CARDIOVASCULAR CONDITION: ICD-10-CM

## 2019-11-19 DIAGNOSIS — E78.2 MIXED HYPERLIPIDEMIA: ICD-10-CM

## 2019-11-19 LAB
ALBUMIN SERPL BCP-MCNC: 3.8 G/DL (ref 3.5–5)
ALP SERPL-CCNC: 62 U/L (ref 46–116)
ALT SERPL W P-5'-P-CCNC: 39 U/L (ref 12–78)
ANION GAP SERPL CALCULATED.3IONS-SCNC: 7 MMOL/L (ref 4–13)
AST SERPL W P-5'-P-CCNC: 24 U/L (ref 5–45)
BACTERIA UR QL AUTO: NORMAL /HPF
BASOPHILS # BLD AUTO: 0.03 THOUSANDS/ΜL (ref 0–0.1)
BASOPHILS NFR BLD AUTO: 1 % (ref 0–1)
BILIRUB SERPL-MCNC: 0.7 MG/DL (ref 0.2–1)
BILIRUB UR QL STRIP: NEGATIVE
BUN SERPL-MCNC: 12 MG/DL (ref 5–25)
CALCIUM SERPL-MCNC: 8.9 MG/DL (ref 8.3–10.1)
CHLORIDE SERPL-SCNC: 102 MMOL/L (ref 100–108)
CHOLEST SERPL-MCNC: 239 MG/DL (ref 50–200)
CLARITY UR: CLEAR
CO2 SERPL-SCNC: 30 MMOL/L (ref 21–32)
COLOR UR: YELLOW
CREAT SERPL-MCNC: 0.7 MG/DL (ref 0.6–1.3)
EOSINOPHIL # BLD AUTO: 0.06 THOUSAND/ΜL (ref 0–0.61)
EOSINOPHIL NFR BLD AUTO: 1 % (ref 0–6)
ERYTHROCYTE [DISTWIDTH] IN BLOOD BY AUTOMATED COUNT: 11.9 % (ref 11.6–15.1)
GFR SERPL CREATININE-BSD FRML MDRD: 101 ML/MIN/1.73SQ M
GLUCOSE P FAST SERPL-MCNC: 97 MG/DL (ref 65–99)
GLUCOSE UR STRIP-MCNC: NEGATIVE MG/DL
HCT VFR BLD AUTO: 46.3 % (ref 34.8–46.1)
HDLC SERPL-MCNC: 55 MG/DL
HGB BLD-MCNC: 14.7 G/DL (ref 11.5–15.4)
HGB UR QL STRIP.AUTO: NEGATIVE
IMM GRANULOCYTES # BLD AUTO: 0 THOUSAND/UL (ref 0–0.2)
IMM GRANULOCYTES NFR BLD AUTO: 0 % (ref 0–2)
KETONES UR STRIP-MCNC: NEGATIVE MG/DL
LDLC SERPL CALC-MCNC: 169 MG/DL (ref 0–100)
LEUKOCYTE ESTERASE UR QL STRIP: NEGATIVE
LYMPHOCYTES # BLD AUTO: 1.98 THOUSANDS/ΜL (ref 0.6–4.47)
LYMPHOCYTES NFR BLD AUTO: 47 % (ref 14–44)
MCH RBC QN AUTO: 29.2 PG (ref 26.8–34.3)
MCHC RBC AUTO-ENTMCNC: 31.7 G/DL (ref 31.4–37.4)
MCV RBC AUTO: 92 FL (ref 82–98)
MONOCYTES # BLD AUTO: 0.26 THOUSAND/ΜL (ref 0.17–1.22)
MONOCYTES NFR BLD AUTO: 6 % (ref 4–12)
NEUTROPHILS # BLD AUTO: 1.88 THOUSANDS/ΜL (ref 1.85–7.62)
NEUTS SEG NFR BLD AUTO: 45 % (ref 43–75)
NITRITE UR QL STRIP: NEGATIVE
NON-SQ EPI CELLS URNS QL MICRO: NORMAL /HPF
NONHDLC SERPL-MCNC: 184 MG/DL
NRBC BLD AUTO-RTO: 0 /100 WBCS
PH UR STRIP.AUTO: 7.5 [PH]
PLATELET # BLD AUTO: 211 THOUSANDS/UL (ref 149–390)
PMV BLD AUTO: 10 FL (ref 8.9–12.7)
POTASSIUM SERPL-SCNC: 3.6 MMOL/L (ref 3.5–5.3)
PROT SERPL-MCNC: 7.6 G/DL (ref 6.4–8.2)
PROT UR STRIP-MCNC: NEGATIVE MG/DL
RBC # BLD AUTO: 5.04 MILLION/UL (ref 3.81–5.12)
RBC #/AREA URNS AUTO: NORMAL /HPF
SODIUM SERPL-SCNC: 139 MMOL/L (ref 136–145)
SP GR UR STRIP.AUTO: <=1.005 (ref 1–1.03)
TRIGL SERPL-MCNC: 75 MG/DL
TSH SERPL DL<=0.05 MIU/L-ACNC: 1.91 UIU/ML (ref 0.36–3.74)
UROBILINOGEN UR QL STRIP.AUTO: 0.2 E.U./DL
WBC # BLD AUTO: 4.21 THOUSAND/UL (ref 4.31–10.16)
WBC #/AREA URNS AUTO: NORMAL /HPF

## 2019-11-19 PROCEDURE — 36415 COLL VENOUS BLD VENIPUNCTURE: CPT

## 2019-11-19 PROCEDURE — 81001 URINALYSIS AUTO W/SCOPE: CPT | Performed by: PHYSICIAN ASSISTANT

## 2019-11-19 PROCEDURE — 80053 COMPREHEN METABOLIC PANEL: CPT

## 2019-11-19 PROCEDURE — 85025 COMPLETE CBC W/AUTO DIFF WBC: CPT

## 2019-11-19 PROCEDURE — 80061 LIPID PANEL: CPT

## 2019-11-19 PROCEDURE — 84443 ASSAY THYROID STIM HORMONE: CPT

## 2019-11-19 NOTE — TELEPHONE ENCOUNTER
----- Message from Martha Barnes PA-C sent at 11/19/2019 11:06 AM EST -----  Please let her know that her urine testing is negative

## 2019-11-19 NOTE — TELEPHONE ENCOUNTER
Called and spoke to patient  Made her aware that her urine testing is negative  Patient verbalized understanding and denies any other questions or concerns

## 2019-11-26 ENCOUNTER — ANNUAL EXAM (OUTPATIENT)
Dept: FAMILY MEDICINE CLINIC | Facility: CLINIC | Age: 49
End: 2019-11-26
Payer: COMMERCIAL

## 2019-11-26 VITALS
OXYGEN SATURATION: 97 % | HEART RATE: 71 BPM | RESPIRATION RATE: 16 BRPM | SYSTOLIC BLOOD PRESSURE: 102 MMHG | BODY MASS INDEX: 25.44 KG/M2 | DIASTOLIC BLOOD PRESSURE: 70 MMHG | HEIGHT: 64 IN | WEIGHT: 149 LBS

## 2019-11-26 DIAGNOSIS — Z12.11 COLON CANCER SCREENING: ICD-10-CM

## 2019-11-26 DIAGNOSIS — Z12.31 SCREENING MAMMOGRAM, ENCOUNTER FOR: ICD-10-CM

## 2019-11-26 DIAGNOSIS — E78.2 MIXED HYPERLIPIDEMIA: ICD-10-CM

## 2019-11-26 DIAGNOSIS — J45.20 MILD INTERMITTENT ASTHMA WITHOUT COMPLICATION: ICD-10-CM

## 2019-11-26 DIAGNOSIS — Z01.419 ENCOUNTER FOR WELL WOMAN EXAM WITH ROUTINE GYNECOLOGICAL EXAM: Primary | ICD-10-CM

## 2019-11-26 DIAGNOSIS — Z23 NEED FOR INFLUENZA VACCINATION: ICD-10-CM

## 2019-11-26 DIAGNOSIS — R10.2 PELVIC PAIN IN FEMALE: ICD-10-CM

## 2019-11-26 PROCEDURE — 90682 RIV4 VACC RECOMBINANT DNA IM: CPT | Performed by: FAMILY MEDICINE

## 2019-11-26 PROCEDURE — 90471 IMMUNIZATION ADMIN: CPT | Performed by: FAMILY MEDICINE

## 2019-11-26 PROCEDURE — S0612 ANNUAL GYNECOLOGICAL EXAMINA: HCPCS | Performed by: FAMILY MEDICINE

## 2019-11-26 RX ORDER — SIMVASTATIN 10 MG
10 TABLET ORAL
Qty: 90 TABLET | Refills: 3 | Status: SHIPPED | OUTPATIENT
Start: 2019-11-26 | End: 2020-06-01 | Stop reason: SINTOL

## 2019-11-26 NOTE — PROGRESS NOTES
BMI Counseling: Body mass index is 25 58 kg/m²  The BMI is above normal  Nutrition recommendations include decreasing portion sizes, encouraging healthy choices of fruits and vegetables, decreasing fast food intake, consuming healthier snacks, limiting drinks that contain sugar, moderation in carbohydrate intake, increasing intake of lean protein and reducing intake of cholesterol  Exercise recommendations include moderate physical activity 150 minutes/week  Assessment/Plan:    Problem List Items Addressed This Visit        Respiratory    Asthma, mild intermittent     Asthma is stable            Other    Hyperlipidemia     After long discussion she has agreed to take statins and I will start her on low-dose simvastatin         Relevant Medications    simvastatin (ZOCOR) 10 mg tablet    Other Relevant Orders    Lipid panel    CBC and differential    Comprehensive metabolic panel    Need for influenza vaccination    Relevant Orders    influenza vaccine, 2664-3216, quadrivalent, recombinant, PF, 0 5 mL, for patients 18 yr+ (FLUBLOK) (Completed)    Encounter for well woman exam with routine gynecological exam - Primary    Pelvic pain in female     Her pelvic exam is normal, advised to get  pelvic ultrasound she already have exam from urologist and there is nothing wrong with the bladder  Advised to get the colonoscopy         Relevant Orders    US pelvis complete non OB          Chief Complaint   Patient presents with    Annual Exam    Gynecologic Exam     lmp aprox 3-4 yrs ago        Subjective:   Patient ID: Haleigh Hsieh is a 44 y o  female  She is here for Gyne exam, annual exam    she complains of having right side lower abdominal or pelvic discomfort is kind of a pain she comes and goes for years, she has been seen by urologist and she has history of bladder surgery, she says they recommended to get her pelvic exam, there is no concern about her bladder    She has menopause for about 3 years,  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   She denies any vaginal discharge bleeding or pain  She has asthma which is stable and she use inhaler sometimes, she does not need a refill,  She has hyperlipidemia for few years she has not been taking any medication and her LDL has been getting worse  She is nonsmoker  Family history of stroke in her brother  Review of Systems   Constitutional: Negative for activity change, appetite change, chills, diaphoresis, fatigue, fever and unexpected weight change  HENT: Negative for congestion, dental problem, ear discharge, ear pain, facial swelling, hearing loss, mouth sores, nosebleeds, postnasal drip, rhinorrhea, sinus pressure, sinus pain, sneezing, sore throat, trouble swallowing and voice change  Eyes: Negative for photophobia, pain, discharge, redness and itching  Respiratory: Negative for cough, chest tightness, shortness of breath and wheezing  Cardiovascular: Negative for chest pain, palpitations and leg swelling  Gastrointestinal: Negative for abdominal distention, abdominal pain, blood in stool, constipation, diarrhea and nausea  Endocrine: Negative for cold intolerance, heat intolerance, polydipsia, polyphagia and polyuria  Genitourinary: Positive for pelvic pain  Negative for decreased urine volume, difficulty urinating, dyspareunia, dysuria, enuresis, flank pain, frequency, genital sores, hematuria, urgency, vaginal bleeding, vaginal discharge and vaginal pain  Musculoskeletal: Negative for arthralgias, back pain, myalgias and neck pain  Skin: Negative for color change and pallor  Allergic/Immunologic: Negative for environmental allergies and food allergies  Neurological: Negative for dizziness, weakness, light-headedness, numbness and headaches  Hematological: Negative for adenopathy  Does not bruise/bleed easily  Psychiatric/Behavioral: Negative for behavioral problems, sleep disturbance and suicidal ideas   The patient is not nervous/anxious  Objective:  Physical Exam   Constitutional: She is oriented to person, place, and time  She appears well-developed and well-nourished  HENT:   Head: Normocephalic and atraumatic  Mouth/Throat: No oropharyngeal exudate  Eyes: Conjunctivae and EOM are normal  Right eye exhibits no discharge  Left eye exhibits no discharge  No scleral icterus  Neck: Normal range of motion  Neck supple  No tracheal deviation present  No thyromegaly present  Cardiovascular: Normal rate, regular rhythm and normal heart sounds  No murmur heard  Pulmonary/Chest: Effort normal and breath sounds normal  No respiratory distress  She has no wheezes  She has no rales  Abdominal: Soft  Bowel sounds are normal  She exhibits no distension and no mass  There is no tenderness  There is no rebound  Hernia confirmed negative in the right inguinal area and confirmed negative in the left inguinal area  Genitourinary: Vagina normal and uterus normal  Pelvic exam was performed with patient supine  Uterus is not tender  Cervix exhibits no discharge  Right adnexum displays no mass, no tenderness and no fullness  Left adnexum displays no mass, no tenderness and no fullness  No vaginal discharge found  Musculoskeletal: Normal range of motion  She exhibits no edema  Lymphadenopathy:     She has no cervical adenopathy  No inguinal adenopathy noted on the right or left side  Neurological: She is alert and oriented to person, place, and time  She has normal reflexes  No cranial nerve deficit  Skin: Skin is warm  No rash noted  No erythema  No pallor  Psychiatric: She has a normal mood and affect  Her behavior is normal  Judgment and thought content normal    Nursing note and vitals reviewed  Past Surgical History:   Procedure Laterality Date    BLADDER SURGERY      bladder sling?  approx 7 years    CYSTOSCOPY      diagnostic,last assessed 10/17/17    OTHER SURGICAL HISTORY      suprapublic sling No family history on file        Current Outpatient Medications:     albuterol (PROAIR HFA) 90 mcg/act inhaler, Inhale 2 puffs every 4 (four) hours as needed for shortness of breath, Disp: 1 each, Rfl: 0    montelukast (SINGULAIR) 10 mg tablet, Take 1 tablet (10 mg total) by mouth daily, Disp: 90 tablet, Rfl: 1    simvastatin (ZOCOR) 10 mg tablet, Take 1 tablet (10 mg total) by mouth daily at bedtime, Disp: 90 tablet, Rfl: 3    Allergies   Allergen Reactions    Pollen Extract        Vitals:    11/26/19 0759   BP: 102/70   Pulse: 71   Resp: 16   SpO2: 97%   Weight: 67 6 kg (149 lb)   Height: 5' 4" (1 626 m)

## 2019-11-26 NOTE — ASSESSMENT & PLAN NOTE
Her pelvic exam is normal, advised to get  pelvic ultrasound she already have exam from urologist and there is nothing wrong with the bladder    Advised to get the colonoscopy

## 2019-12-10 ENCOUNTER — HOSPITAL ENCOUNTER (OUTPATIENT)
Dept: ULTRASOUND IMAGING | Facility: HOSPITAL | Age: 49
Discharge: HOME | End: 2019-12-10
Attending: FAMILY MEDICINE
Payer: COMMERCIAL

## 2019-12-10 DIAGNOSIS — R10.2 PELVIC PAIN IN FEMALE: ICD-10-CM

## 2019-12-10 PROCEDURE — 76830 TRANSVAGINAL US NON-OB: CPT

## 2019-12-10 PROCEDURE — 76856 US EXAM PELVIC COMPLETE: CPT

## 2020-05-26 ENCOUNTER — APPOINTMENT (OUTPATIENT)
Dept: LAB | Facility: CLINIC | Age: 50
End: 2020-05-26
Payer: COMMERCIAL

## 2020-05-26 DIAGNOSIS — E78.2 MIXED HYPERLIPIDEMIA: ICD-10-CM

## 2020-05-26 LAB
ALBUMIN SERPL BCP-MCNC: 3.9 G/DL (ref 3.5–5)
ALP SERPL-CCNC: 64 U/L (ref 46–116)
ALT SERPL W P-5'-P-CCNC: 36 U/L (ref 12–78)
ANION GAP SERPL CALCULATED.3IONS-SCNC: 8 MMOL/L (ref 4–13)
AST SERPL W P-5'-P-CCNC: 21 U/L (ref 5–45)
BASOPHILS # BLD AUTO: 0.04 THOUSANDS/ΜL (ref 0–0.1)
BASOPHILS NFR BLD AUTO: 1 % (ref 0–1)
BILIRUB SERPL-MCNC: 0.47 MG/DL (ref 0.2–1)
BUN SERPL-MCNC: 15 MG/DL (ref 5–25)
CALCIUM SERPL-MCNC: 8.7 MG/DL (ref 8.3–10.1)
CHLORIDE SERPL-SCNC: 102 MMOL/L (ref 100–108)
CHOLEST SERPL-MCNC: 152 MG/DL (ref 50–200)
CO2 SERPL-SCNC: 29 MMOL/L (ref 21–32)
CREAT SERPL-MCNC: 0.66 MG/DL (ref 0.6–1.3)
EOSINOPHIL # BLD AUTO: 0.24 THOUSAND/ΜL (ref 0–0.61)
EOSINOPHIL NFR BLD AUTO: 5 % (ref 0–6)
ERYTHROCYTE [DISTWIDTH] IN BLOOD BY AUTOMATED COUNT: 12.1 % (ref 11.6–15.1)
GFR SERPL CREATININE-BSD FRML MDRD: 103 ML/MIN/1.73SQ M
GLUCOSE P FAST SERPL-MCNC: 108 MG/DL (ref 65–99)
HCT VFR BLD AUTO: 48.8 % (ref 34.8–46.1)
HDLC SERPL-MCNC: 50 MG/DL
HGB BLD-MCNC: 15.4 G/DL (ref 11.5–15.4)
IMM GRANULOCYTES # BLD AUTO: 0.01 THOUSAND/UL (ref 0–0.2)
IMM GRANULOCYTES NFR BLD AUTO: 0 % (ref 0–2)
LDLC SERPL CALC-MCNC: 82 MG/DL (ref 0–100)
LYMPHOCYTES # BLD AUTO: 2.07 THOUSANDS/ΜL (ref 0.6–4.47)
LYMPHOCYTES NFR BLD AUTO: 42 % (ref 14–44)
MCH RBC QN AUTO: 29.2 PG (ref 26.8–34.3)
MCHC RBC AUTO-ENTMCNC: 31.6 G/DL (ref 31.4–37.4)
MCV RBC AUTO: 92 FL (ref 82–98)
MONOCYTES # BLD AUTO: 0.31 THOUSAND/ΜL (ref 0.17–1.22)
MONOCYTES NFR BLD AUTO: 6 % (ref 4–12)
NEUTROPHILS # BLD AUTO: 2.23 THOUSANDS/ΜL (ref 1.85–7.62)
NEUTS SEG NFR BLD AUTO: 46 % (ref 43–75)
NONHDLC SERPL-MCNC: 102 MG/DL
NRBC BLD AUTO-RTO: 0 /100 WBCS
PLATELET # BLD AUTO: 214 THOUSANDS/UL (ref 149–390)
PMV BLD AUTO: 10.6 FL (ref 8.9–12.7)
POTASSIUM SERPL-SCNC: 3.8 MMOL/L (ref 3.5–5.3)
PROT SERPL-MCNC: 7.8 G/DL (ref 6.4–8.2)
RBC # BLD AUTO: 5.28 MILLION/UL (ref 3.81–5.12)
SODIUM SERPL-SCNC: 139 MMOL/L (ref 136–145)
TRIGL SERPL-MCNC: 99 MG/DL
WBC # BLD AUTO: 4.9 THOUSAND/UL (ref 4.31–10.16)

## 2020-05-26 PROCEDURE — 80053 COMPREHEN METABOLIC PANEL: CPT

## 2020-05-26 PROCEDURE — 36415 COLL VENOUS BLD VENIPUNCTURE: CPT

## 2020-05-26 PROCEDURE — 80061 LIPID PANEL: CPT

## 2020-05-26 PROCEDURE — 85025 COMPLETE CBC W/AUTO DIFF WBC: CPT

## 2020-06-01 ENCOUNTER — OFFICE VISIT (OUTPATIENT)
Dept: FAMILY MEDICINE CLINIC | Facility: CLINIC | Age: 50
End: 2020-06-01
Payer: COMMERCIAL

## 2020-06-01 ENCOUNTER — TELEPHONE (OUTPATIENT)
Dept: FAMILY MEDICINE CLINIC | Facility: CLINIC | Age: 50
End: 2020-06-01

## 2020-06-01 VITALS
HEIGHT: 64 IN | BODY MASS INDEX: 25.44 KG/M2 | RESPIRATION RATE: 16 BRPM | WEIGHT: 149 LBS | TEMPERATURE: 98 F | SYSTOLIC BLOOD PRESSURE: 114 MMHG | DIASTOLIC BLOOD PRESSURE: 74 MMHG | OXYGEN SATURATION: 98 % | HEART RATE: 78 BPM

## 2020-06-01 DIAGNOSIS — J30.1 SEASONAL ALLERGIC RHINITIS DUE TO POLLEN: ICD-10-CM

## 2020-06-01 DIAGNOSIS — J45.20 MILD INTERMITTENT ASTHMA WITHOUT COMPLICATION: ICD-10-CM

## 2020-06-01 DIAGNOSIS — E78.2 MIXED HYPERLIPIDEMIA: Primary | ICD-10-CM

## 2020-06-01 DIAGNOSIS — R73.01 IMPAIRED FASTING GLUCOSE: ICD-10-CM

## 2020-06-01 PROCEDURE — 3008F BODY MASS INDEX DOCD: CPT | Performed by: FAMILY MEDICINE

## 2020-06-01 PROCEDURE — 1036F TOBACCO NON-USER: CPT | Performed by: FAMILY MEDICINE

## 2020-06-01 PROCEDURE — 99214 OFFICE O/P EST MOD 30 MIN: CPT | Performed by: FAMILY MEDICINE

## 2020-06-01 RX ORDER — MONTELUKAST SODIUM 10 MG/1
10 TABLET ORAL DAILY
Qty: 90 TABLET | Refills: 1 | Status: SHIPPED | OUTPATIENT
Start: 2020-06-01 | End: 2022-02-28 | Stop reason: SDUPTHER

## 2020-06-01 RX ORDER — PRAVASTATIN SODIUM 10 MG
TABLET ORAL
Qty: 90 TABLET | Refills: 0 | Status: SHIPPED | OUTPATIENT
Start: 2020-06-01 | End: 2021-11-02 | Stop reason: SINTOL

## 2020-06-25 ENCOUNTER — TELEPHONE (OUTPATIENT)
Dept: GASTROENTEROLOGY | Facility: AMBULARY SURGERY CENTER | Age: 50
End: 2020-06-25

## 2020-08-03 ENCOUNTER — TELEMEDICINE (OUTPATIENT)
Dept: FAMILY MEDICINE CLINIC | Facility: CLINIC | Age: 50
End: 2020-08-03
Payer: COMMERCIAL

## 2020-08-03 DIAGNOSIS — E78.2 MIXED HYPERLIPIDEMIA: Primary | ICD-10-CM

## 2020-08-03 DIAGNOSIS — J45.20 MILD INTERMITTENT ASTHMA WITHOUT COMPLICATION: ICD-10-CM

## 2020-08-03 PROBLEM — R10.2 PELVIC PAIN IN FEMALE: Status: RESOLVED | Noted: 2019-11-26 | Resolved: 2020-08-03

## 2020-08-03 PROCEDURE — 99213 OFFICE O/P EST LOW 20 MIN: CPT | Performed by: FAMILY MEDICINE

## 2020-08-03 PROCEDURE — 1036F TOBACCO NON-USER: CPT | Performed by: FAMILY MEDICINE

## 2020-08-03 NOTE — PROGRESS NOTES
Virtual Regular Visit      Assessment/Plan:    Problem List Items Addressed This Visit     None               Reason for visit is   Chief Complaint   Patient presents with    Virtual Regular Visit        Encounter provider Grecia Navarro MD    Provider located at [address] [id number]      Recent Visits  No visits were found meeting these conditions  Showing recent visits within past 7 days and meeting all other requirements     Today's Visits  Date Type Provider Dept   08/03/20 Telemedicine Grecia Navarro MD [facility]   Showing today's visits and meeting all other requirements     Future Appointments  No visits were found meeting these conditions  Showing future appointments within next 150 days and meeting all other requirements        The patient was identified by name and date of birth  Clay SHULTZ Le was informed that this is a telemedicine visit and that the visit is being conducted through [facility] and patient was informed that this is a secure, HIPAA-compliant platform  She agrees to proceed     My office door was closed  No one else was in the room  She acknowledged consent and understanding of privacy and security of the video platform  The patient has agreed to participate and understands they can discontinue the visit at any time  Patient is aware this is a billable service  Jovani Snow is a 44 y o  female   [facility]       She has hyperlipidemia and she was given simvastatin and she got myalgias, then it was stopped then she is started on pravastatin and advised to start with lowest dose on alternate days, she tried that for few weeks but she says she get muscle pain around her upper back around the neck area and she try to stop medicine and that symptoms resolved, so she is not taking this medicine since last week,  She denies any other symptoms of headache chest pain shortness of breath,  Her asthma is under control currently she is not taking Singulair or inhalers  Past Medical History:   Diagnosis Date    Leukocytosis     last assessed 7/31/17       Past Surgical History:   Procedure Laterality Date    BLADDER SURGERY      bladder sling? approx 7 years    CYSTOSCOPY      diagnostic,last assessed 10/17/17    OTHER SURGICAL HISTORY      suprapublic sling       Current Outpatient Medications   Medication Sig Dispense Refill    albuterol (PROAIR HFA) 90 mcg/act inhaler Inhale 2 puffs every 4 (four) hours as needed for shortness of breath 1 each 0    montelukast (SINGULAIR) 10 mg tablet Take 1 tablet (10 mg total) by mouth daily 90 tablet 1    NON FORMULARY Apply 30 mL topically daily at bedtime Cleanse & apply 2 pumps to entire face at night or as directed by doctor   pravastatin (PRAVACHOL) 10 mg tablet Take 1 pill every other day 90 tablet 0     No current facility-administered medications for this visit  Allergies   Allergen Reactions    Pollen Extract        Review of Systems   Constitutional: Negative  HENT: Negative  Eyes: Negative  Respiratory: Negative  Cardiovascular: Negative  Gastrointestinal: Negative  Musculoskeletal:        Myalgia with the statin   Neurological: Negative  Psychiatric/Behavioral: Negative  Video Exam    There were no vitals filed for this visit  Physical Exam   Constitutional: She is oriented to person, place, and time  Pulmonary/Chest: Effort normal    Abdominal: Normal appearance  Musculoskeletal: Normal range of motion  Neurological: She is alert and oriented to person, place, and time  I spent 15 minutes directly with the patient during this visit      VIRTUAL VISIT DISCLAIMER    Clay Scott acknowledges that she has consented to an online visit or consultation   She understands that the online visit is based solely on information provided by her, and that, in the absence of a face-to-face physical evaluation by the physician, the diagnosis she receives is both limited and provisional in terms of accuracy and completeness  This is not intended to replace a full medical face-to-face evaluation by the physician  Clay Dixon understands and accepts these terms

## 2020-08-03 NOTE — ASSESSMENT & PLAN NOTE
She cannot tolerate statins, 1st tried simvastatin and that cause myalgia, now pravastatin cause myalgia, she will stop the medicine completely and in December we will review her labs again her blood work in May seems reasonably good so she will continue low-fat diet and exercise

## 2020-09-15 ENCOUNTER — IMMUNIZATIONS (OUTPATIENT)
Dept: FAMILY MEDICINE CLINIC | Facility: CLINIC | Age: 50
End: 2020-09-15
Payer: COMMERCIAL

## 2020-09-15 DIAGNOSIS — Z23 ENCOUNTER FOR IMMUNIZATION: Primary | ICD-10-CM

## 2020-09-15 PROCEDURE — 90471 IMMUNIZATION ADMIN: CPT

## 2020-09-15 PROCEDURE — 90682 RIV4 VACC RECOMBINANT DNA IM: CPT

## 2021-09-21 ENCOUNTER — TELEPHONE (OUTPATIENT)
Dept: FAMILY MEDICINE CLINIC | Facility: CLINIC | Age: 51
End: 2021-09-21

## 2021-09-21 NOTE — TELEPHONE ENCOUNTER
Pt's significant other called and scheduled client an annual physical and wanted to know if Dr More Ivey could order labs before the physical

## 2021-10-25 ENCOUNTER — APPOINTMENT (OUTPATIENT)
Dept: LAB | Facility: CLINIC | Age: 51
End: 2021-10-25
Payer: COMMERCIAL

## 2021-10-25 DIAGNOSIS — Z13.1 SCREENING FOR DIABETES MELLITUS: ICD-10-CM

## 2021-10-25 DIAGNOSIS — Z13.6 SCREENING FOR CARDIOVASCULAR CONDITION: ICD-10-CM

## 2021-10-25 LAB
ALBUMIN SERPL BCP-MCNC: 3.8 G/DL (ref 3.5–5)
ALP SERPL-CCNC: 57 U/L (ref 46–116)
ALT SERPL W P-5'-P-CCNC: 47 U/L (ref 12–78)
ANION GAP SERPL CALCULATED.3IONS-SCNC: 11 MMOL/L (ref 4–13)
AST SERPL W P-5'-P-CCNC: 25 U/L (ref 5–45)
BASOPHILS # BLD AUTO: 0.03 THOUSANDS/ΜL (ref 0–0.1)
BASOPHILS NFR BLD AUTO: 1 % (ref 0–1)
BILIRUB SERPL-MCNC: 0.48 MG/DL (ref 0.2–1)
BUN SERPL-MCNC: 12 MG/DL (ref 5–25)
CALCIUM SERPL-MCNC: 8.9 MG/DL (ref 8.3–10.1)
CHLORIDE SERPL-SCNC: 104 MMOL/L (ref 100–108)
CHOLEST SERPL-MCNC: 217 MG/DL (ref 50–200)
CO2 SERPL-SCNC: 27 MMOL/L (ref 21–32)
CREAT SERPL-MCNC: 0.75 MG/DL (ref 0.6–1.3)
EOSINOPHIL # BLD AUTO: 0.23 THOUSAND/ΜL (ref 0–0.61)
EOSINOPHIL NFR BLD AUTO: 6 % (ref 0–6)
ERYTHROCYTE [DISTWIDTH] IN BLOOD BY AUTOMATED COUNT: 12 % (ref 11.6–15.1)
EST. AVERAGE GLUCOSE BLD GHB EST-MCNC: 131 MG/DL
GFR SERPL CREATININE-BSD FRML MDRD: 91 ML/MIN/1.73SQ M
GLUCOSE P FAST SERPL-MCNC: 116 MG/DL (ref 65–99)
HBA1C MFR BLD: 6.2 %
HCT VFR BLD AUTO: 48 % (ref 34.8–46.1)
HDLC SERPL-MCNC: 51 MG/DL
HGB BLD-MCNC: 14.9 G/DL (ref 11.5–15.4)
IMM GRANULOCYTES # BLD AUTO: 0 THOUSAND/UL (ref 0–0.2)
IMM GRANULOCYTES NFR BLD AUTO: 0 % (ref 0–2)
LDLC SERPL CALC-MCNC: 153 MG/DL (ref 0–100)
LYMPHOCYTES # BLD AUTO: 1.7 THOUSANDS/ΜL (ref 0.6–4.47)
LYMPHOCYTES NFR BLD AUTO: 40 % (ref 14–44)
MCH RBC QN AUTO: 29 PG (ref 26.8–34.3)
MCHC RBC AUTO-ENTMCNC: 31 G/DL (ref 31.4–37.4)
MCV RBC AUTO: 93 FL (ref 82–98)
MONOCYTES # BLD AUTO: 0.31 THOUSAND/ΜL (ref 0.17–1.22)
MONOCYTES NFR BLD AUTO: 7 % (ref 4–12)
NEUTROPHILS # BLD AUTO: 1.95 THOUSANDS/ΜL (ref 1.85–7.62)
NEUTS SEG NFR BLD AUTO: 46 % (ref 43–75)
NONHDLC SERPL-MCNC: 166 MG/DL
NRBC BLD AUTO-RTO: 0 /100 WBCS
PLATELET # BLD AUTO: 213 THOUSANDS/UL (ref 149–390)
PMV BLD AUTO: 10.6 FL (ref 8.9–12.7)
POTASSIUM SERPL-SCNC: 3.9 MMOL/L (ref 3.5–5.3)
PROT SERPL-MCNC: 7.5 G/DL (ref 6.4–8.2)
RBC # BLD AUTO: 5.14 MILLION/UL (ref 3.81–5.12)
SODIUM SERPL-SCNC: 142 MMOL/L (ref 136–145)
TRIGL SERPL-MCNC: 67 MG/DL
TSH SERPL DL<=0.05 MIU/L-ACNC: 1.19 UIU/ML (ref 0.36–3.74)
WBC # BLD AUTO: 4.22 THOUSAND/UL (ref 4.31–10.16)

## 2021-10-25 PROCEDURE — 83036 HEMOGLOBIN GLYCOSYLATED A1C: CPT

## 2021-10-25 PROCEDURE — 85025 COMPLETE CBC W/AUTO DIFF WBC: CPT

## 2021-10-25 PROCEDURE — 80053 COMPREHEN METABOLIC PANEL: CPT

## 2021-10-25 PROCEDURE — 84443 ASSAY THYROID STIM HORMONE: CPT

## 2021-10-25 PROCEDURE — 36415 COLL VENOUS BLD VENIPUNCTURE: CPT

## 2021-10-25 PROCEDURE — 80061 LIPID PANEL: CPT

## 2021-11-02 ENCOUNTER — OFFICE VISIT (OUTPATIENT)
Dept: FAMILY MEDICINE CLINIC | Facility: CLINIC | Age: 51
End: 2021-11-02
Payer: COMMERCIAL

## 2021-11-02 VITALS
SYSTOLIC BLOOD PRESSURE: 102 MMHG | DIASTOLIC BLOOD PRESSURE: 60 MMHG | WEIGHT: 152 LBS | HEART RATE: 76 BPM | HEIGHT: 63 IN | OXYGEN SATURATION: 100 % | RESPIRATION RATE: 16 BRPM | BODY MASS INDEX: 26.93 KG/M2

## 2021-11-02 DIAGNOSIS — E78.2 MIXED HYPERLIPIDEMIA: ICD-10-CM

## 2021-11-02 DIAGNOSIS — Z00.00 ANNUAL PHYSICAL EXAM: Primary | ICD-10-CM

## 2021-11-02 DIAGNOSIS — Z11.59 NEED FOR HEPATITIS C SCREENING TEST: ICD-10-CM

## 2021-11-02 DIAGNOSIS — Z12.31 ENCOUNTER FOR SCREENING MAMMOGRAM FOR BREAST CANCER: ICD-10-CM

## 2021-11-02 DIAGNOSIS — R73.03 PREDIABETES: ICD-10-CM

## 2021-11-02 DIAGNOSIS — Z11.4 SCREENING FOR HIV (HUMAN IMMUNODEFICIENCY VIRUS): ICD-10-CM

## 2021-11-02 PROCEDURE — 1036F TOBACCO NON-USER: CPT | Performed by: FAMILY MEDICINE

## 2021-11-02 PROCEDURE — 3008F BODY MASS INDEX DOCD: CPT | Performed by: FAMILY MEDICINE

## 2021-11-02 PROCEDURE — 3725F SCREEN DEPRESSION PERFORMED: CPT | Performed by: FAMILY MEDICINE

## 2021-11-02 PROCEDURE — 99396 PREV VISIT EST AGE 40-64: CPT | Performed by: FAMILY MEDICINE

## 2021-11-02 RX ORDER — ATORVASTATIN CALCIUM 10 MG/1
10 TABLET, FILM COATED ORAL DAILY
Qty: 90 TABLET | Refills: 3 | Status: SHIPPED | OUTPATIENT
Start: 2021-11-02

## 2022-02-21 ENCOUNTER — APPOINTMENT (OUTPATIENT)
Dept: LAB | Facility: CLINIC | Age: 52
End: 2022-02-21
Payer: COMMERCIAL

## 2022-02-21 DIAGNOSIS — Z11.59 NEED FOR HEPATITIS C SCREENING TEST: ICD-10-CM

## 2022-02-21 DIAGNOSIS — R73.03 PREDIABETES: ICD-10-CM

## 2022-02-21 DIAGNOSIS — Z11.4 SCREENING FOR HIV (HUMAN IMMUNODEFICIENCY VIRUS): ICD-10-CM

## 2022-02-21 DIAGNOSIS — E78.2 MIXED HYPERLIPIDEMIA: ICD-10-CM

## 2022-02-21 LAB
ALBUMIN SERPL BCP-MCNC: 3.9 G/DL (ref 3.5–5)
ALP SERPL-CCNC: 52 U/L (ref 46–116)
ALT SERPL W P-5'-P-CCNC: 33 U/L (ref 12–78)
ANION GAP SERPL CALCULATED.3IONS-SCNC: 8 MMOL/L (ref 4–13)
AST SERPL W P-5'-P-CCNC: 23 U/L (ref 5–45)
BASOPHILS # BLD AUTO: 0.02 THOUSANDS/ΜL (ref 0–0.1)
BASOPHILS NFR BLD AUTO: 0 % (ref 0–1)
BILIRUB SERPL-MCNC: 0.41 MG/DL (ref 0.2–1)
BUN SERPL-MCNC: 15 MG/DL (ref 5–25)
CALCIUM SERPL-MCNC: 8.8 MG/DL (ref 8.3–10.1)
CHLORIDE SERPL-SCNC: 103 MMOL/L (ref 100–108)
CHOLEST SERPL-MCNC: 167 MG/DL
CO2 SERPL-SCNC: 25 MMOL/L (ref 21–32)
CREAT SERPL-MCNC: 0.74 MG/DL (ref 0.6–1.3)
CREAT UR-MCNC: <13 MG/DL
EOSINOPHIL # BLD AUTO: 0.15 THOUSAND/ΜL (ref 0–0.61)
EOSINOPHIL NFR BLD AUTO: 3 % (ref 0–6)
ERYTHROCYTE [DISTWIDTH] IN BLOOD BY AUTOMATED COUNT: 11.9 % (ref 11.6–15.1)
EST. AVERAGE GLUCOSE BLD GHB EST-MCNC: 128 MG/DL
GFR SERPL CREATININE-BSD FRML MDRD: 92 ML/MIN/1.73SQ M
GLUCOSE P FAST SERPL-MCNC: 105 MG/DL (ref 65–99)
HBA1C MFR BLD: 6.1 %
HCT VFR BLD AUTO: 47.4 % (ref 34.8–46.1)
HCV AB SER QL: NORMAL
HDLC SERPL-MCNC: 52 MG/DL
HGB BLD-MCNC: 15.1 G/DL (ref 11.5–15.4)
IMM GRANULOCYTES # BLD AUTO: 0.01 THOUSAND/UL (ref 0–0.2)
IMM GRANULOCYTES NFR BLD AUTO: 0 % (ref 0–2)
LDLC SERPL CALC-MCNC: 91 MG/DL (ref 0–100)
LYMPHOCYTES # BLD AUTO: 2.37 THOUSANDS/ΜL (ref 0.6–4.47)
LYMPHOCYTES NFR BLD AUTO: 46 % (ref 14–44)
MCH RBC QN AUTO: 29.4 PG (ref 26.8–34.3)
MCHC RBC AUTO-ENTMCNC: 31.9 G/DL (ref 31.4–37.4)
MCV RBC AUTO: 92 FL (ref 82–98)
MICROALBUMIN UR-MCNC: <5 MG/L (ref 0–20)
MONOCYTES # BLD AUTO: 0.39 THOUSAND/ΜL (ref 0.17–1.22)
MONOCYTES NFR BLD AUTO: 7 % (ref 4–12)
NEUTROPHILS # BLD AUTO: 2.32 THOUSANDS/ΜL (ref 1.85–7.62)
NEUTS SEG NFR BLD AUTO: 44 % (ref 43–75)
NONHDLC SERPL-MCNC: 115 MG/DL
NRBC BLD AUTO-RTO: 0 /100 WBCS
PLATELET # BLD AUTO: 184 THOUSANDS/UL (ref 149–390)
PMV BLD AUTO: 10.6 FL (ref 8.9–12.7)
POTASSIUM SERPL-SCNC: 3.8 MMOL/L (ref 3.5–5.3)
PROT SERPL-MCNC: 7.4 G/DL (ref 6.4–8.2)
RBC # BLD AUTO: 5.14 MILLION/UL (ref 3.81–5.12)
SODIUM SERPL-SCNC: 136 MMOL/L (ref 136–145)
TRIGL SERPL-MCNC: 118 MG/DL
WBC # BLD AUTO: 5.26 THOUSAND/UL (ref 4.31–10.16)

## 2022-02-21 PROCEDURE — 87389 HIV-1 AG W/HIV-1&-2 AB AG IA: CPT

## 2022-02-21 PROCEDURE — 80053 COMPREHEN METABOLIC PANEL: CPT

## 2022-02-21 PROCEDURE — 82043 UR ALBUMIN QUANTITATIVE: CPT | Performed by: FAMILY MEDICINE

## 2022-02-21 PROCEDURE — 83036 HEMOGLOBIN GLYCOSYLATED A1C: CPT

## 2022-02-21 PROCEDURE — 80061 LIPID PANEL: CPT

## 2022-02-21 PROCEDURE — 86803 HEPATITIS C AB TEST: CPT

## 2022-02-21 PROCEDURE — 85025 COMPLETE CBC W/AUTO DIFF WBC: CPT

## 2022-02-21 PROCEDURE — 36415 COLL VENOUS BLD VENIPUNCTURE: CPT

## 2022-02-21 PROCEDURE — 82570 ASSAY OF URINE CREATININE: CPT | Performed by: FAMILY MEDICINE

## 2022-02-22 LAB — HIV 1+2 AB+HIV1 P24 AG SERPL QL IA: NORMAL

## 2022-02-28 ENCOUNTER — OFFICE VISIT (OUTPATIENT)
Dept: FAMILY MEDICINE CLINIC | Facility: CLINIC | Age: 52
End: 2022-02-28
Payer: COMMERCIAL

## 2022-02-28 VITALS
HEIGHT: 63 IN | RESPIRATION RATE: 16 BRPM | SYSTOLIC BLOOD PRESSURE: 110 MMHG | WEIGHT: 153 LBS | BODY MASS INDEX: 27.11 KG/M2 | OXYGEN SATURATION: 99 % | DIASTOLIC BLOOD PRESSURE: 68 MMHG | HEART RATE: 69 BPM

## 2022-02-28 DIAGNOSIS — R73.03 PREDIABETES: ICD-10-CM

## 2022-02-28 DIAGNOSIS — E78.2 MIXED HYPERLIPIDEMIA: ICD-10-CM

## 2022-02-28 DIAGNOSIS — J45.20 MILD INTERMITTENT ASTHMA WITHOUT COMPLICATION: ICD-10-CM

## 2022-02-28 DIAGNOSIS — J30.1 SEASONAL ALLERGIC RHINITIS DUE TO POLLEN: Primary | ICD-10-CM

## 2022-02-28 DIAGNOSIS — R35.0 FREQUENCY OF MICTURITION: ICD-10-CM

## 2022-02-28 PROCEDURE — 99214 OFFICE O/P EST MOD 30 MIN: CPT | Performed by: FAMILY MEDICINE

## 2022-02-28 PROCEDURE — 3008F BODY MASS INDEX DOCD: CPT | Performed by: FAMILY MEDICINE

## 2022-02-28 PROCEDURE — 1036F TOBACCO NON-USER: CPT | Performed by: FAMILY MEDICINE

## 2022-02-28 PROCEDURE — 3725F SCREEN DEPRESSION PERFORMED: CPT | Performed by: FAMILY MEDICINE

## 2022-02-28 RX ORDER — FLUTICASONE PROPIONATE 50 MCG
2 SPRAY, SUSPENSION (ML) NASAL DAILY
Qty: 16 G | Refills: 2 | Status: SHIPPED | OUTPATIENT
Start: 2022-02-28

## 2022-02-28 RX ORDER — ALBUTEROL SULFATE 90 UG/1
2 AEROSOL, METERED RESPIRATORY (INHALATION) EVERY 6 HOURS PRN
Qty: 18 G | Refills: 1 | Status: SHIPPED | OUTPATIENT
Start: 2022-02-28

## 2022-02-28 RX ORDER — MONTELUKAST SODIUM 10 MG/1
10 TABLET ORAL DAILY
Qty: 90 TABLET | Refills: 1 | Status: SHIPPED | OUTPATIENT
Start: 2022-02-28

## 2022-02-28 NOTE — PROGRESS NOTES
Assessment/Plan:    Problem List Items Addressed This Visit        Respiratory    Allergic rhinitis - Primary    Relevant Medications    fluticasone (FLONASE) 50 mcg/act nasal spray    montelukast (SINGULAIR) 10 mg tablet    Asthma, mild intermittent     She only gets intermittent asthma and wheezing in spring  Advised to start Singulair before the season starts so her asthma will be better controlled,   refill on inhaler given         Relevant Medications    albuterol (ProAir HFA) 90 mcg/act inhaler    fluticasone (FLONASE) 50 mcg/act nasal spray    montelukast (SINGULAIR) 10 mg tablet       Other    Hyperlipidemia     Lab Results   Component Value Date    LDLCALC 91 02/21/2022   Lipitor is working great for cholesterol, in the past she has tried simvastatin which give her myalgia and pravastatin give her headaches, she wants to change the Lipitor again    She believes she has sometimes muscle cramping and dry throat which is due to the Lipitor, discussed with her the dry throat could be high blood sugar and try heat, she should not drink fluid before the bedtime  And she will try to hold the Lipitor for 2 weeks and see if her symptoms improve and then she will let me now if she feels her symptoms resolve without Lipitor then it could be the side effect which is unlikely           Relevant Orders    CBC and differential    Comprehensive metabolic panel    Hemoglobin A1C    Lipid panel    Prediabetes     Her fasting blood sugar A1c improved, she will continue on low carb diet try to lose weight BMI is 27 1         Relevant Orders    Comprehensive metabolic panel    Hemoglobin A1C    Frequency of micturition     Advised to see the urologist, in the past she has seen the urologist for her bladder issues discussed about not drinking fluid before the bedtime but she says she cannot stop drinking as she feels throat is dry, advised to have sips of water when she feels dry  Dryness of throat can also be due to the dry heat           She refused for the mammogram    Return in about 6 months (around 8/28/2022) for Recheck, gyne , PAP  Chief Complaint   Patient presents with    Follow-up       Subjective:   Patient ID: Clay Walls is a 46 y o  female  She is here for follow-up on her labs, she says she only use medications for allergy and asthma in spring season, she is not currently using but needs refills, she says medications are expensive  She feels like when allergies comes the Singulair does not work,  She also has tried simvastatin and pravastatin in the past and simvastatin give her myalgia and pravastatin give her headache, so it was stopped now she is on Lipitor her cholesterol is improving significantly but she says sometimes she get muscle cramps but not daily basis and she complains of dry throat and frequency of urine  And she believes it is due to medication Lipitor  In the past she has been also seen by urologist for some hematuria and has not followed up again  She does not want to get mammogram  Last Pap done 6 years ago    Review of Systems   Constitutional: Negative for activity change, appetite change, chills, fatigue, fever and unexpected weight change  HENT: Negative for congestion, ear discharge, ear pain, nosebleeds, postnasal drip, rhinorrhea, sinus pressure, sneezing, sore throat, trouble swallowing and voice change  Eyes: Negative for photophobia, pain, discharge, redness and itching  Respiratory: Negative for cough, chest tightness, shortness of breath and wheezing  Cardiovascular: Negative for chest pain, palpitations and leg swelling  Gastrointestinal: Negative for abdominal pain, constipation, diarrhea, nausea and vomiting  Endocrine: Negative for polyuria  Genitourinary: Negative for dysuria, frequency and urgency  Musculoskeletal: Negative for arthralgias, back pain, myalgias and neck pain          Sometimes feels cramping the muscles   Skin: Negative for color change, pallor and rash  Allergic/Immunologic: Negative for environmental allergies and food allergies  Neurological: Negative for dizziness, weakness, light-headedness and headaches  Hematological: Negative for adenopathy  Does not bruise/bleed easily  Psychiatric/Behavioral: Negative for behavioral problems  The patient is not nervous/anxious  Objective:  Physical Exam  Vitals and nursing note reviewed  Constitutional:       Appearance: She is well-developed  HENT:      Head: Normocephalic and atraumatic  Mouth/Throat:      Pharynx: No oropharyngeal exudate  Eyes:      General: No scleral icterus  Right eye: No discharge  Left eye: No discharge  Conjunctiva/sclera: Conjunctivae normal       Pupils: Pupils are equal, round, and reactive to light  Neck:      Thyroid: No thyromegaly  Trachea: No tracheal deviation  Cardiovascular:      Rate and Rhythm: Normal rate and regular rhythm  Heart sounds: Normal heart sounds  No murmur heard  Pulmonary:      Effort: Pulmonary effort is normal  No respiratory distress  Breath sounds: Normal breath sounds  No wheezing or rales  Abdominal:      Palpations: Abdomen is soft  Musculoskeletal:         General: Normal range of motion  Cervical back: Normal range of motion and neck supple  Right lower leg: No edema  Left lower leg: No edema  Lymphadenopathy:      Cervical: No cervical adenopathy  Skin:     General: Skin is warm  Coloration: Skin is not pale  Findings: No erythema or rash  Neurological:      Mental Status: She is alert and oriented to person, place, and time  Cranial Nerves: No cranial nerve deficit  Deep Tendon Reflexes: Reflexes are normal and symmetric  Psychiatric:         Behavior: Behavior normal          Thought Content:  Thought content normal          Judgment: Judgment normal             Past Surgical History:   Procedure Laterality Date    BLADDER SURGERY      bladder sling? approx 7 years    CYSTOSCOPY      diagnostic,last assessed 10/17/17    OTHER SURGICAL HISTORY      suprapublic sling       History reviewed  No pertinent family history        Current Outpatient Medications:     albuterol (ProAir HFA) 90 mcg/act inhaler, Inhale 2 puffs every 6 (six) hours as needed for shortness of breath, Disp: 18 g, Rfl: 1    atorvastatin (LIPITOR) 10 mg tablet, Take 1 tablet (10 mg total) by mouth daily, Disp: 90 tablet, Rfl: 3    fluticasone (FLONASE) 50 mcg/act nasal spray, 2 sprays into each nostril daily, Disp: 16 g, Rfl: 2    montelukast (SINGULAIR) 10 mg tablet, Take 1 tablet (10 mg total) by mouth daily, Disp: 90 tablet, Rfl: 1    NON FORMULARY, Apply 30 mL topically daily at bedtime Cleanse & apply 2 pumps to entire face at night or as directed by doctor , Disp: , Rfl:     Allergies   Allergen Reactions    Pollen Extract        Vitals:    02/28/22 0854   BP: 110/68   Pulse: 69   Resp: 16   SpO2: 99%   Weight: 69 4 kg (153 lb)   Height: 5' 3" (1 6 m)

## 2022-06-02 ENCOUNTER — OFFICE VISIT (OUTPATIENT)
Dept: FAMILY MEDICINE CLINIC | Facility: CLINIC | Age: 52
End: 2022-06-02
Payer: COMMERCIAL

## 2022-06-02 VITALS
HEIGHT: 63 IN | HEART RATE: 74 BPM | SYSTOLIC BLOOD PRESSURE: 118 MMHG | OXYGEN SATURATION: 98 % | BODY MASS INDEX: 25.87 KG/M2 | WEIGHT: 146 LBS | DIASTOLIC BLOOD PRESSURE: 78 MMHG

## 2022-06-02 DIAGNOSIS — J30.1 SEASONAL ALLERGIC RHINITIS DUE TO POLLEN: ICD-10-CM

## 2022-06-02 DIAGNOSIS — R30.0 DYSURIA: ICD-10-CM

## 2022-06-02 DIAGNOSIS — N39.0 URINARY TRACT INFECTION WITH HEMATURIA, SITE UNSPECIFIED: Primary | ICD-10-CM

## 2022-06-02 DIAGNOSIS — R31.9 URINARY TRACT INFECTION WITH HEMATURIA, SITE UNSPECIFIED: Primary | ICD-10-CM

## 2022-06-02 DIAGNOSIS — R73.03 PREDIABETES: ICD-10-CM

## 2022-06-02 LAB
SL AMB  POCT GLUCOSE, UA: NORMAL
SL AMB LEUKOCYTE ESTERASE,UA: 500
SL AMB POCT BILIRUBIN,UA: NORMAL
SL AMB POCT BLOOD,UA: 250
SL AMB POCT CLARITY,UA: CLEAR
SL AMB POCT COLOR,UA: YELLOW
SL AMB POCT KETONES,UA: NORMAL
SL AMB POCT NITRITE,UA: NORMAL
SL AMB POCT PH,UA: 7
SL AMB POCT SPECIFIC GRAVITY,UA: 1
SL AMB POCT URINE PROTEIN: NORMAL
SL AMB POCT UROBILINOGEN: NORMAL

## 2022-06-02 PROCEDURE — 3008F BODY MASS INDEX DOCD: CPT | Performed by: FAMILY MEDICINE

## 2022-06-02 PROCEDURE — 1036F TOBACCO NON-USER: CPT | Performed by: FAMILY MEDICINE

## 2022-06-02 PROCEDURE — 81003 URINALYSIS AUTO W/O SCOPE: CPT | Performed by: FAMILY MEDICINE

## 2022-06-02 PROCEDURE — 99214 OFFICE O/P EST MOD 30 MIN: CPT | Performed by: FAMILY MEDICINE

## 2022-06-02 RX ORDER — SULFAMETHOXAZOLE AND TRIMETHOPRIM 800; 160 MG/1; MG/1
1 TABLET ORAL 2 TIMES DAILY
Qty: 14 TABLET | Refills: 0 | Status: SHIPPED | OUTPATIENT
Start: 2022-06-02 | End: 2022-06-09

## 2022-06-02 NOTE — ASSESSMENT & PLAN NOTE
Urine dipstick shows 500 leukocytes and 250 RBCs, will send the urine for culture and will start her on Bactrim and advised to drink lot of fluids and follow-up if not better

## 2022-06-02 NOTE — ASSESSMENT & PLAN NOTE
She takes Singulair, and she feels very dry in her mouth, advised to take only as needed when she has lot of mucus

## 2022-06-02 NOTE — PROGRESS NOTES
Assessment/Plan:    Problem List Items Addressed This Visit        Respiratory    Allergic rhinitis     She takes Singulair, and she feels very dry in her mouth, advised to take only as needed when she has lot of mucus              Genitourinary    Urinary tract infection with hematuria - Primary    Relevant Medications    sulfamethoxazole-trimethoprim (BACTRIM DS) 800-160 mg per tablet       Other    Prediabetes     Lab Results   Component Value Date    HGBA1C 6 1 (H) 02/21/2022     Discussed with her to keep low carb diet as her blood sugar currently 2 more infections and she has now UTI           Dysuria     Urine dipstick shows 500 leukocytes and 250 RBCs, will send the urine for culture and will start her on Bactrim and advised to drink lot of fluids and follow-up if not better           Relevant Orders    POCT urine dip (Completed)          No follow-ups on file  Chief Complaint   Patient presents with    Urinary Frequency     Burning when urinating       Subjective:   Patient ID: Clay Amezquita is a 46 y o  female  Urinary Frequency   This is a new problem  The current episode started yesterday  The problem occurs every urination  The problem has been unchanged  The quality of the pain is described as burning  The pain is mild  There has been no fever  She is sexually active  Associated symptoms include chills, frequency, hematuria, hesitancy and urgency  Pertinent negatives include no discharge, flank pain, nausea, sweats or vomiting  The treatment provided no relief  There is no history of kidney stones  Review of Systems   Constitutional: Positive for chills  Gastrointestinal: Negative for nausea and vomiting  Genitourinary: Positive for frequency, hematuria, hesitancy and urgency  Negative for flank pain  Objective:  Physical Exam  Vitals and nursing note reviewed  Constitutional:       Appearance: She is well-developed  HENT:      Head: Normocephalic and atraumatic  Mouth/Throat:      Pharynx: No oropharyngeal exudate  Eyes:      General: No scleral icterus  Right eye: No discharge  Left eye: No discharge  Conjunctiva/sclera: Conjunctivae normal       Pupils: Pupils are equal, round, and reactive to light  Neck:      Thyroid: No thyromegaly  Trachea: No tracheal deviation  Cardiovascular:      Rate and Rhythm: Normal rate and regular rhythm  Heart sounds: Normal heart sounds  No murmur heard  Pulmonary:      Effort: Pulmonary effort is normal  No respiratory distress  Breath sounds: Normal breath sounds  No wheezing or rales  Abdominal:      General: Bowel sounds are normal  There is no distension  Palpations: Abdomen is soft  There is no mass  Tenderness: There is no abdominal tenderness  There is no rebound  Musculoskeletal:         General: Normal range of motion  Cervical back: Normal range of motion and neck supple  Right lower leg: No edema  Left lower leg: No edema  Lymphadenopathy:      Cervical: No cervical adenopathy  Skin:     General: Skin is warm  Coloration: Skin is not pale  Findings: No erythema or rash  Neurological:      Mental Status: She is alert and oriented to person, place, and time  Cranial Nerves: No cranial nerve deficit  Deep Tendon Reflexes: Reflexes are normal and symmetric  Psychiatric:         Behavior: Behavior normal          Thought Content: Thought content normal          Judgment: Judgment normal             Past Surgical History:   Procedure Laterality Date    BLADDER SURGERY      bladder sling? approx 7 years    CYSTOSCOPY      diagnostic,last assessed 10/17/17    OTHER SURGICAL HISTORY      suprapublic sling       History reviewed  No pertinent family history        Current Outpatient Medications:     albuterol (ProAir HFA) 90 mcg/act inhaler, Inhale 2 puffs every 6 (six) hours as needed for shortness of breath, Disp: 18 g, Rfl: 1   atorvastatin (LIPITOR) 10 mg tablet, Take 1 tablet (10 mg total) by mouth daily, Disp: 90 tablet, Rfl: 3    fluticasone (FLONASE) 50 mcg/act nasal spray, 2 sprays into each nostril daily, Disp: 16 g, Rfl: 2    montelukast (SINGULAIR) 10 mg tablet, Take 1 tablet (10 mg total) by mouth daily, Disp: 90 tablet, Rfl: 1    NON FORMULARY, Apply 30 mL topically daily at bedtime Cleanse & apply 2 pumps to entire face at night or as directed by doctor , Disp: , Rfl:     sulfamethoxazole-trimethoprim (BACTRIM DS) 800-160 mg per tablet, Take 1 tablet by mouth 2 (two) times a day for 7 days, Disp: 14 tablet, Rfl: 0    Allergies   Allergen Reactions    Pollen Extract        Vitals:    06/02/22 1252   BP: 118/78   Pulse: 74   SpO2: 98%   Weight: 66 2 kg (146 lb)   Height: 5' 3" (1 6 m)

## 2022-06-02 NOTE — ASSESSMENT & PLAN NOTE
Lab Results   Component Value Date    HGBA1C 6 1 (H) 02/21/2022     Discussed with her to keep low carb diet as her blood sugar currently 2 more infections and she has now UTI

## 2022-06-09 ENCOUNTER — TELEPHONE (OUTPATIENT)
Dept: FAMILY MEDICINE CLINIC | Facility: CLINIC | Age: 52
End: 2022-06-09

## 2022-06-09 LAB — COLOR UR: NORMAL

## 2022-09-06 ENCOUNTER — APPOINTMENT (OUTPATIENT)
Dept: LAB | Facility: CLINIC | Age: 52
End: 2022-09-06
Payer: COMMERCIAL

## 2022-09-06 DIAGNOSIS — R73.03 PREDIABETES: ICD-10-CM

## 2022-09-06 DIAGNOSIS — E78.2 MIXED HYPERLIPIDEMIA: ICD-10-CM

## 2022-09-06 LAB
ALBUMIN SERPL BCP-MCNC: 4.3 G/DL (ref 3.5–5)
ALP SERPL-CCNC: 44 U/L (ref 34–104)
ALT SERPL W P-5'-P-CCNC: 26 U/L (ref 7–52)
ANION GAP SERPL CALCULATED.3IONS-SCNC: 5 MMOL/L (ref 4–13)
AST SERPL W P-5'-P-CCNC: 20 U/L (ref 13–39)
BASOPHILS # BLD AUTO: 0.02 THOUSANDS/ΜL (ref 0–0.1)
BASOPHILS NFR BLD AUTO: 1 % (ref 0–1)
BILIRUB SERPL-MCNC: 0.5 MG/DL (ref 0.2–1)
BUN SERPL-MCNC: 16 MG/DL (ref 5–25)
CALCIUM SERPL-MCNC: 9.2 MG/DL (ref 8.4–10.2)
CHLORIDE SERPL-SCNC: 103 MMOL/L (ref 96–108)
CHOLEST SERPL-MCNC: 159 MG/DL
CO2 SERPL-SCNC: 30 MMOL/L (ref 21–32)
CREAT SERPL-MCNC: 0.6 MG/DL (ref 0.6–1.3)
EOSINOPHIL # BLD AUTO: 0.08 THOUSAND/ΜL (ref 0–0.61)
EOSINOPHIL NFR BLD AUTO: 2 % (ref 0–6)
ERYTHROCYTE [DISTWIDTH] IN BLOOD BY AUTOMATED COUNT: 12.1 % (ref 11.6–15.1)
EST. AVERAGE GLUCOSE BLD GHB EST-MCNC: 128 MG/DL
GFR SERPL CREATININE-BSD FRML MDRD: 104 ML/MIN/1.73SQ M
GLUCOSE P FAST SERPL-MCNC: 103 MG/DL (ref 65–99)
HBA1C MFR BLD: 6.1 %
HCT VFR BLD AUTO: 44.7 % (ref 34.8–46.1)
HDLC SERPL-MCNC: 52 MG/DL
HGB BLD-MCNC: 14.5 G/DL (ref 11.5–15.4)
IMM GRANULOCYTES # BLD AUTO: 0.01 THOUSAND/UL (ref 0–0.2)
IMM GRANULOCYTES NFR BLD AUTO: 0 % (ref 0–2)
LDLC SERPL CALC-MCNC: 92 MG/DL (ref 0–100)
LYMPHOCYTES # BLD AUTO: 1.89 THOUSANDS/ΜL (ref 0.6–4.47)
LYMPHOCYTES NFR BLD AUTO: 44 % (ref 14–44)
MCH RBC QN AUTO: 29.4 PG (ref 26.8–34.3)
MCHC RBC AUTO-ENTMCNC: 32.4 G/DL (ref 31.4–37.4)
MCV RBC AUTO: 91 FL (ref 82–98)
MONOCYTES # BLD AUTO: 0.29 THOUSAND/ΜL (ref 0.17–1.22)
MONOCYTES NFR BLD AUTO: 7 % (ref 4–12)
NEUTROPHILS # BLD AUTO: 1.99 THOUSANDS/ΜL (ref 1.85–7.62)
NEUTS SEG NFR BLD AUTO: 46 % (ref 43–75)
NONHDLC SERPL-MCNC: 107 MG/DL
NRBC BLD AUTO-RTO: 0 /100 WBCS
PLATELET # BLD AUTO: 187 THOUSANDS/UL (ref 149–390)
PMV BLD AUTO: 10.7 FL (ref 8.9–12.7)
POTASSIUM SERPL-SCNC: 3.7 MMOL/L (ref 3.5–5.3)
PROT SERPL-MCNC: 7.1 G/DL (ref 6.4–8.4)
RBC # BLD AUTO: 4.94 MILLION/UL (ref 3.81–5.12)
SODIUM SERPL-SCNC: 138 MMOL/L (ref 135–147)
TRIGL SERPL-MCNC: 75 MG/DL
WBC # BLD AUTO: 4.28 THOUSAND/UL (ref 4.31–10.16)

## 2022-09-06 PROCEDURE — 80061 LIPID PANEL: CPT

## 2022-09-06 PROCEDURE — 36415 COLL VENOUS BLD VENIPUNCTURE: CPT

## 2022-09-06 PROCEDURE — 83036 HEMOGLOBIN GLYCOSYLATED A1C: CPT

## 2022-09-06 PROCEDURE — 80053 COMPREHEN METABOLIC PANEL: CPT

## 2022-09-06 PROCEDURE — 85025 COMPLETE CBC W/AUTO DIFF WBC: CPT

## 2022-09-12 ENCOUNTER — ANNUAL EXAM (OUTPATIENT)
Dept: FAMILY MEDICINE CLINIC | Facility: CLINIC | Age: 52
End: 2022-09-12
Payer: COMMERCIAL

## 2022-09-12 VITALS
HEIGHT: 63 IN | WEIGHT: 148 LBS | HEART RATE: 82 BPM | OXYGEN SATURATION: 98 % | SYSTOLIC BLOOD PRESSURE: 120 MMHG | DIASTOLIC BLOOD PRESSURE: 78 MMHG | BODY MASS INDEX: 26.22 KG/M2

## 2022-09-12 DIAGNOSIS — Z12.4 SCREENING FOR CERVICAL CANCER: ICD-10-CM

## 2022-09-12 DIAGNOSIS — R73.03 PREDIABETES: ICD-10-CM

## 2022-09-12 DIAGNOSIS — M54.9 UPPER BACK PAIN, CHRONIC: ICD-10-CM

## 2022-09-12 DIAGNOSIS — G89.29 UPPER BACK PAIN, CHRONIC: ICD-10-CM

## 2022-09-12 DIAGNOSIS — K21.9 GASTROESOPHAGEAL REFLUX DISEASE WITHOUT ESOPHAGITIS: ICD-10-CM

## 2022-09-12 DIAGNOSIS — Z00.00 ANNUAL PHYSICAL EXAM: Primary | ICD-10-CM

## 2022-09-12 DIAGNOSIS — E78.2 MIXED HYPERLIPIDEMIA: ICD-10-CM

## 2022-09-12 PROBLEM — N39.0 URINARY TRACT INFECTION WITH HEMATURIA: Status: RESOLVED | Noted: 2022-06-02 | Resolved: 2022-09-12

## 2022-09-12 PROBLEM — R30.0 DYSURIA: Status: RESOLVED | Noted: 2022-06-02 | Resolved: 2022-09-12

## 2022-09-12 PROBLEM — R31.9 HEMATURIA: Status: RESOLVED | Noted: 2017-05-23 | Resolved: 2022-09-12

## 2022-09-12 PROBLEM — R31.9 URINARY TRACT INFECTION WITH HEMATURIA: Status: RESOLVED | Noted: 2022-06-02 | Resolved: 2022-09-12

## 2022-09-12 PROBLEM — R35.0 FREQUENCY OF MICTURITION: Status: RESOLVED | Noted: 2022-02-28 | Resolved: 2022-09-12

## 2022-09-12 PROCEDURE — 99396 PREV VISIT EST AGE 40-64: CPT | Performed by: FAMILY MEDICINE

## 2022-09-12 RX ORDER — OMEPRAZOLE 20 MG/1
20 CAPSULE, DELAYED RELEASE ORAL DAILY
Qty: 90 CAPSULE | Refills: 0 | Status: SHIPPED | OUTPATIENT
Start: 2022-09-12

## 2022-09-12 RX ORDER — ATORVASTATIN CALCIUM 10 MG/1
TABLET, FILM COATED ORAL
Qty: 45 TABLET | Refills: 3 | Status: SHIPPED | OUTPATIENT
Start: 2022-09-12

## 2022-09-12 RX ORDER — ATORVASTATIN CALCIUM 10 MG/1
10 TABLET, FILM COATED ORAL DAILY
Qty: 90 TABLET | Refills: 3 | Status: SHIPPED | OUTPATIENT
Start: 2022-09-12 | End: 2022-09-12 | Stop reason: SDUPTHER

## 2022-09-12 NOTE — PROGRESS NOTES
ADULT ANNUAL [address]  [facility]    NAME: Clay Jeffery  AGE: 46 y o  SEX: female  : 1970     DATE: 2022     Assessment and Plan:     Problem List Items Addressed This Visit        Digestive    Gastroesophageal reflux disease without esophagitis     He complains of acid reflux,, discussed about the diet which can reduce the acid reflux, and she should try to avoid eating food 3 hours before bedtime and she should not eat spicy food, avoid caffeinated drinks         Relevant Medications    omeprazole (PriLOSEC) 20 mg delayed release capsule       Other    Hyperlipidemia    Relevant Medications    atorvastatin (LIPITOR) 10 mg tablet    Other Relevant Orders    CBC and differential    Comprehensive metabolic panel    Lipid panel    Annual physical exam - Primary    Prediabetes    Relevant Orders    Comprehensive metabolic panel    Lipid panel    TSH, 3rd generation    Hemoglobin A1C    Screening for cervical cancer    Relevant Orders    Thinprep Pap and HR HPV DNA ([facility])    BMI 26 0-26 9,adult    Upper back pain, chronic     She complains of intermittent upper back pain, in the middle  And she works in a [employer] , her exam is normal, advised to do physical therapy and patient refused         Relevant Orders    Ambulatory Referral to Physical Therapy          Immunizations and preventive care screenings were discussed with patient today  Appropriate education was printed on patient's after visit summary  Counseling:  Dental Health: discussed importance of regular tooth brushing, flossing, and dental visits  Sexual health: discussed sexually transmitted diseases, partner selection, use of condoms, avoidance of unintended pregnancy, and contraceptive alternatives  Exercise: the importance of regular exercise/physical activity was discussed  Recommend exercise 3-5 times per week for at least 30 minutes  BMI Counseling:  Body mass index is 26 22 kg/m²  The BMI is above normal  Nutrition recommendations include decreasing portion sizes, moderation in carbohydrate intake and reducing intake of cholesterol  Exercise recommendations include exercising 3-5 times per week and strength training exercises  Rationale for BMI follow-up plan is due to patient being overweight or obese  Return in about 6 months (around 3/12/2023) for Recheck  Chief Complaint:     Chief Complaint   Patient presents with    Gynecologic Exam      History of Present Illness:     Adult Annual Physical   Patient here for a comprehensive physical exam  The patient reports problems -   Reyes Julio Complains of intermittent upper back pain mostly in the mid part, and radiation, she works in a [employer] ,  She also complain of sometimes she feel acid reflux denies any chest pain or shortness of breath,   She also has hyperlipidemia and taking only Lipitor half tablet since she was prescribed and her cholesterol is improving    Diet and Physical Activity  Diet/Nutrition: well balanced diet  Exercise: walking  Depression Screening  PHQ-2/9 Depression Screening         General Health  Sleep: sleeps well  Hearing: normal - bilateral   Vision: no vision problems  Dental: regular dental visits  /GYN Health  Patient is: postmenopausal  Last menstrual period: 2016       Review of Systems:     Review of Systems   Constitutional: Negative for activity change, appetite change, chills, fatigue, fever and unexpected weight change  HENT: Negative for congestion, ear discharge, ear pain, nosebleeds, postnasal drip, rhinorrhea, sinus pressure, sneezing, sore throat, trouble swallowing and voice change  Eyes: Negative for photophobia, pain, discharge, redness and itching  Respiratory: Negative for cough, chest tightness, shortness of breath and wheezing  Cardiovascular: Negative for chest pain, palpitations and leg swelling     Gastrointestinal: Negative for abdominal pain, constipation, diarrhea, nausea and vomiting  Endocrine: Negative for polyuria  Genitourinary: Negative for dysuria, frequency and urgency  Musculoskeletal: Negative for arthralgias, back pain, myalgias and neck pain  Skin: Negative for color change, pallor and rash  Allergic/Immunologic: Negative for environmental allergies and food allergies  Neurological: Negative for dizziness, weakness, light-headedness and headaches  Hematological: Negative for adenopathy  Does not bruise/bleed easily  Psychiatric/Behavioral: Negative for behavioral problems and sleep disturbance  The patient is not nervous/anxious  Past Medical History:     Past Medical History:   Diagnosis Date    Leukocytosis     last assessed 7/31/17      Past Surgical History:     Past Surgical History:   Procedure Laterality Date    BLADDER SURGERY      bladder sling? approx 7 years    CYSTOSCOPY      diagnostic,last assessed 10/17/17    OTHER SURGICAL HISTORY      suprapublic sling      Social History:     Social History     Socioeconomic History    Marital status: Single     Spouse name: None    Number of children: 4    Years of education: None    Highest education level: None   Occupational History    None   Tobacco Use    Smoking status: Never Smoker    Smokeless tobacco: Never Used   Substance and Sexual Activity    Alcohol use: Yes     Comment: social    Drug use: No    Sexual activity: None   Other Topics Concern    None   Social History Narrative    None     Social Determinants of Health     Financial Resource Strain: Not on file   Food Insecurity: Not on file   Transportation Needs: Not on file   Physical Activity: Not on file   Stress: Not on file   Social Connections: Not on file   Intimate Partner Violence: Not on file   Housing Stability: Not on file      Family History:     History reviewed  No pertinent family history     Current Medications:     Current Outpatient Medications   Medication Sig Dispense Refill    albuterol (ProAir HFA) 90 mcg/act inhaler Inhale 2 puffs every 6 (six) hours as needed for shortness of breath 18 g 1    atorvastatin (LIPITOR) 10 mg tablet Half tab po daily 45 tablet 3    fluticasone (FLONASE) 50 mcg/act nasal spray 2 sprays into each nostril daily 16 g 2    NON FORMULARY Apply 30 mL topically daily at bedtime Cleanse & apply 2 pumps to entire face at night or as directed by doctor   omeprazole (PriLOSEC) 20 mg delayed release capsule Take 1 capsule (20 mg total) by mouth daily 90 capsule 0     No current facility-administered medications for this visit  Allergies: Allergies   Allergen Reactions    Pollen Extract       Physical Exam:     /78   Pulse 82   Ht 5' 3" (1 6 m)   Wt 67 1 kg (148 lb)   SpO2 98%   BMI 26 22 kg/m²     Physical Exam  Vitals and nursing note reviewed  Exam conducted with a chaperone present  Constitutional:       General: She is not in acute distress  Appearance: Normal appearance  She is not ill-appearing  HENT:      Head: Normocephalic and atraumatic  Right Ear: Tympanic membrane and ear canal normal  There is impacted cerumen  Left Ear: Tympanic membrane and ear canal normal  There is impacted cerumen  Nose: Nose normal  No congestion or rhinorrhea  Mouth/Throat:      Mouth: Mucous membranes are moist       Pharynx: Oropharynx is clear  No oropharyngeal exudate or posterior oropharyngeal erythema  Eyes:      General: No scleral icterus  Right eye: No discharge  Left eye: No discharge  Extraocular Movements: Extraocular movements intact  Conjunctiva/sclera: Conjunctivae normal       Pupils: Pupils are equal, round, and reactive to light  Cardiovascular:      Rate and Rhythm: Normal rate and regular rhythm  Heart sounds: Normal heart sounds  No murmur heard  Pulmonary:      Effort: Pulmonary effort is normal       Breath sounds: Normal breath sounds  No wheezing or rales  Abdominal:      General: Abdomen is flat  Bowel sounds are normal  There is no distension  Palpations: Abdomen is soft  There is no mass  Tenderness: There is no abdominal tenderness  Hernia: There is no hernia in the left inguinal area or right inguinal area  Genitourinary:     General: Normal vulva  Exam position: Supine  Pubic Area: No rash  Labia:         Right: No rash  Left: No rash  Urethra: No prolapse  Vagina: No vaginal discharge  Cervix: No discharge, friability, lesion, erythema or cervical bleeding  Uterus: Normal  Not tender  Adnexa: Right adnexa normal and left adnexa normal         Right: No tenderness or fullness  Left: No tenderness or fullness  Musculoskeletal:         General: No swelling, tenderness or deformity  Normal range of motion  Cervical back: Normal range of motion and neck supple  No muscular tenderness  Right lower leg: No edema  Left lower leg: No edema  Lymphadenopathy:      Cervical: No cervical adenopathy  Skin:     Coloration: Skin is not jaundiced or pale  Findings: No erythema, lesion or rash  Neurological:      General: No focal deficit present  Mental Status: She is alert and oriented to person, place, and time        Gait: Gait normal    Psychiatric:         Mood and Affect: Mood normal          Behavior: Behavior normal           MD [place]athon [place] [id number]

## 2022-09-12 NOTE — ASSESSMENT & PLAN NOTE
She complains of intermittent upper back pain, in the middle    And she works in a [employer] , her exam is normal, advised to do physical therapy and patient refused

## 2022-09-12 NOTE — ASSESSMENT & PLAN NOTE
He complains of acid reflux,, discussed about the diet which can reduce the acid reflux, and she should try to avoid eating food 3 hours before bedtime and she should not eat spicy food, avoid caffeinated drinks

## 2022-09-12 NOTE — PATIENT INSTRUCTIONS

## 2022-09-14 LAB
CLINICAL INFO: NORMAL
CYTO CVX: NORMAL
DATE PREVIOUS BX: NORMAL
HPV E6+E7 MRNA CVX QL NAA+PROBE: NOT DETECTED
LMP START DATE: NORMAL
SL AMB PREV. PAP:: NORMAL
SPECIMEN SOURCE CVX/VAG CYTO: NORMAL

## 2022-09-15 NOTE — RESULT ENCOUNTER NOTE
I spoke with the patient that cells are not adequate and PAPis need to be repeated, and HPV is negative so if she wants to wait till next year we can repeat her Pap smear next year and if she wants to come I can repeat her Pap smear again now ,but she chose to do next year

## 2022-10-01 ENCOUNTER — OFFICE VISIT (OUTPATIENT)
Dept: URGENT CARE | Facility: CLINIC | Age: 52
End: 2022-10-01
Payer: COMMERCIAL

## 2022-10-01 ENCOUNTER — NURSE TRIAGE (OUTPATIENT)
Dept: OTHER | Facility: OTHER | Age: 52
End: 2022-10-01

## 2022-10-01 VITALS
DIASTOLIC BLOOD PRESSURE: 56 MMHG | HEART RATE: 65 BPM | RESPIRATION RATE: 16 BRPM | SYSTOLIC BLOOD PRESSURE: 99 MMHG | TEMPERATURE: 97.5 F

## 2022-10-01 DIAGNOSIS — N39.0 URINARY TRACT INFECTION WITH HEMATURIA, SITE UNSPECIFIED: Primary | ICD-10-CM

## 2022-10-01 DIAGNOSIS — R31.9 URINARY TRACT INFECTION WITH HEMATURIA, SITE UNSPECIFIED: Primary | ICD-10-CM

## 2022-10-01 LAB
SL AMB  POCT GLUCOSE, UA: ABNORMAL
SL AMB LEUKOCYTE ESTERASE,UA: ABNORMAL
SL AMB POCT BILIRUBIN,UA: ABNORMAL
SL AMB POCT BLOOD,UA: ABNORMAL
SL AMB POCT CLARITY,UA: CLEAR
SL AMB POCT COLOR,UA: ABNORMAL
SL AMB POCT KETONES,UA: ABNORMAL
SL AMB POCT NITRITE,UA: ABNORMAL
SL AMB POCT PH,UA: 8.5
SL AMB POCT SPECIFIC GRAVITY,UA: 1
SL AMB POCT URINE PROTEIN: ABNORMAL
SL AMB POCT UROBILINOGEN: 0.2

## 2022-10-01 PROCEDURE — 81002 URINALYSIS NONAUTO W/O SCOPE: CPT | Performed by: PHYSICIAN ASSISTANT

## 2022-10-01 PROCEDURE — 87086 URINE CULTURE/COLONY COUNT: CPT | Performed by: PHYSICIAN ASSISTANT

## 2022-10-01 PROCEDURE — 99213 OFFICE O/P EST LOW 20 MIN: CPT | Performed by: PHYSICIAN ASSISTANT

## 2022-10-01 PROCEDURE — 87077 CULTURE AEROBIC IDENTIFY: CPT | Performed by: PHYSICIAN ASSISTANT

## 2022-10-01 PROCEDURE — 87186 SC STD MICRODIL/AGAR DIL: CPT | Performed by: PHYSICIAN ASSISTANT

## 2022-10-01 RX ORDER — NITROFURANTOIN 25; 75 MG/1; MG/1
100 CAPSULE ORAL 2 TIMES DAILY
Qty: 14 CAPSULE | Refills: 0 | Status: SHIPPED | OUTPATIENT
Start: 2022-10-01 | End: 2022-10-08

## 2022-10-01 NOTE — TELEPHONE ENCOUNTER
Patients  called in stating wife informed him of frequent urination, blood in urine, and  Burning   symptoms are similar to past UTI according to   Unsure how much blood in urine per  and wife is currently at work  Per protocol advised [facility] due to PCP office being closed   verbalized understanding

## 2022-10-01 NOTE — TELEPHONE ENCOUNTER
Reason for Disposition   Urinating more frequently than usual (i e , frequency)    Answer Assessment - Initial Assessment Questions  1  SYMPTOM: "What's the main symptom you're concerned about?" (e g , frequency, incontinence)      Burning and blood per    2  ONSET: "When did the symptoms  start?"  Yesterday   3  PAIN: "Is there any pain?" If Yes, ask: "How bad is it?" (Scale: 1-10; mild, moderate, severe)    Couldn't sleep all night   4  CAUSE: "What do you think is causing the symptoms?"     Reoccurring uti   5   OTHER SYMPTOMS: "Do you have any other symptoms?" (e g , fever, flank pain, blood in urine, pain with urination)      Increased frequency    Protocols used: [facility]

## 2022-10-01 NOTE — TELEPHONE ENCOUNTER
Regarding: UTI    ----- Message from Merlinda Dennis sent at 10/1/2022  8:17 AM EDT -----  "my wife has a bladder infection  It burns and she had some blood when she urinates   She had one about 5 months ago"

## 2022-10-01 NOTE — PROGRESS NOTES
[facility]        NAME: Wale Palencia is a 46 y o  female  : 1970    MRN: [id number]  DATE: 2022  TIME: 2:04 PM    Assessment and Plan   Urinary tract infection with hematuria, site unspecified [N39 0, R31 9]  1  Urinary tract infection with hematuria, site unspecified  POCT urine dip    Urine culture    nitrofurantoin (MACROBID) 100 mg capsule         Patient Instructions       Follow up with PCP in 3-5 days  Proceed to  ER if symptoms worsen  Chief Complaint     Chief Complaint   Patient presents with    Possible UTI     Started 2 days ago with dysuria and frequency, hematuria  History of Present Illness       Patient for evaluation of urinary burning, pressure, frequency  Patient's symptoms started yesterday  Patient's last UTI was about 5-6 months ago  Review of Systems   Review of Systems   Constitutional: Negative  Gastrointestinal: Negative  Genitourinary: Positive for dysuria, frequency, hematuria and urgency  Negative for decreased urine volume, difficulty urinating, flank pain, pelvic pain and vaginal pain           Current Medications       Current Outpatient Medications:     atorvastatin (LIPITOR) 10 mg tablet, Half tab po daily, Disp: 45 tablet, Rfl: 3    nitrofurantoin (MACROBID) 100 mg capsule, Take 1 capsule (100 mg total) by mouth 2 (two) times a day for 7 days, Disp: 14 capsule, Rfl: 0    omeprazole (PriLOSEC) 20 mg delayed release capsule, Take 1 capsule (20 mg total) by mouth daily, Disp: 90 capsule, Rfl: 0    albuterol (ProAir HFA) 90 mcg/act inhaler, Inhale 2 puffs every 6 (six) hours as needed for shortness of breath (Patient not taking: Reported on 10/1/2022), Disp: 18 g, Rfl: 1    fluticasone (FLONASE) 50 mcg/act nasal spray, 2 sprays into each nostril daily (Patient not taking: Reported on 10/1/2022), Disp: 16 g, Rfl: 2    NON FORMULARY, Apply 30 mL topically daily at bedtime Cleanse & apply 2 pumps to entire face at night or as directed by doctor  (Patient not taking: Reported on 10/1/2022), Disp: , Rfl:     Current Allergies     Allergies as of 10/01/2022 - Reviewed 10/01/2022   Allergen Reaction Noted    Pollen extract  05/16/2018            The following portions of the patient's history were reviewed and updated as appropriate: allergies, current medications, past family history, past medical history, past social history, past surgical history and problem list      Past Medical History:   Diagnosis Date    Leukocytosis     last assessed 7/31/17       Past Surgical History:   Procedure Laterality Date    BLADDER SURGERY      bladder sling? approx 7 years    CYSTOSCOPY      diagnostic,last assessed 10/17/17    OTHER SURGICAL HISTORY      suprapublic sling       No family history on file  Medications have been verified  Objective   BP 99/56 (Patient Position: Sitting)   Pulse 65   Temp 97 5 °F (36 4 °C) (Temporal)   Resp 16   No LMP recorded  Physical Exam     Physical Exam  Vitals and nursing note reviewed  Constitutional:       General: She is not in acute distress  Appearance: Normal appearance  She is well-developed  She is not ill-appearing, toxic-appearing or diaphoretic  HENT:      Head: Normocephalic and atraumatic  Abdominal:      General: Bowel sounds are normal  There is no distension  Palpations: Abdomen is soft  There is no mass  Tenderness: There is no abdominal tenderness  There is no right CVA tenderness, left CVA tenderness, guarding or rebound  Skin:     General: Skin is warm and dry  Findings: No rash  Neurological:      Mental Status: She is alert and oriented to person, place, and time  Psychiatric:         Behavior: Behavior normal          Thought Content:  Thought content normal          Judgment: Judgment normal

## 2022-10-04 LAB — BACTERIA UR CULT: ABNORMAL

## 2022-10-12 PROBLEM — Z11.59 NEED FOR HEPATITIS C SCREENING TEST: Status: RESOLVED | Noted: 2021-11-02 | Resolved: 2022-10-12

## 2022-11-11 PROBLEM — Z12.4 SCREENING FOR CERVICAL CANCER: Status: RESOLVED | Noted: 2022-09-12 | Resolved: 2022-11-11

## 2022-12-08 DIAGNOSIS — K21.9 GASTROESOPHAGEAL REFLUX DISEASE WITHOUT ESOPHAGITIS: ICD-10-CM

## 2022-12-08 RX ORDER — OMEPRAZOLE 20 MG/1
CAPSULE, DELAYED RELEASE ORAL
Qty: 90 CAPSULE | Refills: 0 | Status: SHIPPED | OUTPATIENT
Start: 2022-12-08

## 2023-03-09 DIAGNOSIS — K21.9 GASTROESOPHAGEAL REFLUX DISEASE WITHOUT ESOPHAGITIS: ICD-10-CM

## 2023-03-09 RX ORDER — OMEPRAZOLE 20 MG/1
CAPSULE, DELAYED RELEASE ORAL
Qty: 90 CAPSULE | Refills: 0 | Status: SHIPPED | OUTPATIENT
Start: 2023-03-09

## 2023-03-13 ENCOUNTER — APPOINTMENT (OUTPATIENT)
Dept: LAB | Facility: CLINIC | Age: 53
End: 2023-03-13

## 2023-03-13 DIAGNOSIS — R73.03 PREDIABETES: ICD-10-CM

## 2023-03-13 DIAGNOSIS — E78.2 MIXED HYPERLIPIDEMIA: ICD-10-CM

## 2023-03-13 LAB
ALBUMIN SERPL BCP-MCNC: 4.4 G/DL (ref 3.5–5)
ALP SERPL-CCNC: 48 U/L (ref 34–104)
ALT SERPL W P-5'-P-CCNC: 34 U/L (ref 7–52)
ANION GAP SERPL CALCULATED.3IONS-SCNC: 5 MMOL/L (ref 4–13)
AST SERPL W P-5'-P-CCNC: 24 U/L (ref 13–39)
BASOPHILS # BLD AUTO: 0.03 THOUSANDS/ÂΜL (ref 0–0.1)
BASOPHILS NFR BLD AUTO: 1 % (ref 0–1)
BILIRUB SERPL-MCNC: 0.61 MG/DL (ref 0.2–1)
BUN SERPL-MCNC: 10 MG/DL (ref 5–25)
CALCIUM SERPL-MCNC: 9.4 MG/DL (ref 8.4–10.2)
CHLORIDE SERPL-SCNC: 101 MMOL/L (ref 96–108)
CHOLEST SERPL-MCNC: 139 MG/DL
CO2 SERPL-SCNC: 32 MMOL/L (ref 21–32)
CREAT SERPL-MCNC: 0.61 MG/DL (ref 0.6–1.3)
EOSINOPHIL # BLD AUTO: 0.08 THOUSAND/ÂΜL (ref 0–0.61)
EOSINOPHIL NFR BLD AUTO: 1 % (ref 0–6)
ERYTHROCYTE [DISTWIDTH] IN BLOOD BY AUTOMATED COUNT: 12.1 % (ref 11.6–15.1)
EST. AVERAGE GLUCOSE BLD GHB EST-MCNC: 128 MG/DL
GFR SERPL CREATININE-BSD FRML MDRD: 103 ML/MIN/1.73SQ M
GLUCOSE P FAST SERPL-MCNC: 106 MG/DL (ref 65–99)
HBA1C MFR BLD: 6.1 %
HCT VFR BLD AUTO: 47.8 % (ref 34.8–46.1)
HDLC SERPL-MCNC: 49 MG/DL
HGB BLD-MCNC: 15.2 G/DL (ref 11.5–15.4)
IMM GRANULOCYTES # BLD AUTO: 0.01 THOUSAND/UL (ref 0–0.2)
IMM GRANULOCYTES NFR BLD AUTO: 0 % (ref 0–2)
LDLC SERPL CALC-MCNC: 72 MG/DL (ref 0–100)
LYMPHOCYTES # BLD AUTO: 1.7 THOUSANDS/ÂΜL (ref 0.6–4.47)
LYMPHOCYTES NFR BLD AUTO: 29 % (ref 14–44)
MCH RBC QN AUTO: 29.7 PG (ref 26.8–34.3)
MCHC RBC AUTO-ENTMCNC: 31.8 G/DL (ref 31.4–37.4)
MCV RBC AUTO: 93 FL (ref 82–98)
MONOCYTES # BLD AUTO: 0.6 THOUSAND/ÂΜL (ref 0.17–1.22)
MONOCYTES NFR BLD AUTO: 10 % (ref 4–12)
NEUTROPHILS # BLD AUTO: 3.5 THOUSANDS/ÂΜL (ref 1.85–7.62)
NEUTS SEG NFR BLD AUTO: 59 % (ref 43–75)
NONHDLC SERPL-MCNC: 90 MG/DL
NRBC BLD AUTO-RTO: 0 /100 WBCS
PLATELET # BLD AUTO: 168 THOUSANDS/UL (ref 149–390)
PMV BLD AUTO: 10.9 FL (ref 8.9–12.7)
POTASSIUM SERPL-SCNC: 3.8 MMOL/L (ref 3.5–5.3)
PROT SERPL-MCNC: 7.2 G/DL (ref 6.4–8.4)
RBC # BLD AUTO: 5.12 MILLION/UL (ref 3.81–5.12)
SODIUM SERPL-SCNC: 138 MMOL/L (ref 135–147)
TRIGL SERPL-MCNC: 89 MG/DL
TSH SERPL DL<=0.05 MIU/L-ACNC: 2.15 UIU/ML (ref 0.45–4.5)
WBC # BLD AUTO: 5.92 THOUSAND/UL (ref 4.31–10.16)

## 2023-03-20 ENCOUNTER — OFFICE VISIT (OUTPATIENT)
Dept: FAMILY MEDICINE CLINIC | Facility: CLINIC | Age: 53
End: 2023-03-20

## 2023-03-20 VITALS
BODY MASS INDEX: 27.11 KG/M2 | OXYGEN SATURATION: 98 % | DIASTOLIC BLOOD PRESSURE: 78 MMHG | HEIGHT: 63 IN | RESPIRATION RATE: 16 BRPM | SYSTOLIC BLOOD PRESSURE: 120 MMHG | HEART RATE: 75 BPM | WEIGHT: 153 LBS

## 2023-03-20 DIAGNOSIS — R73.03 PREDIABETES: ICD-10-CM

## 2023-03-20 DIAGNOSIS — J30.1 SEASONAL ALLERGIC RHINITIS DUE TO POLLEN: Primary | ICD-10-CM

## 2023-03-20 DIAGNOSIS — J45.20 MILD INTERMITTENT ASTHMA WITHOUT COMPLICATION: ICD-10-CM

## 2023-03-20 DIAGNOSIS — E78.2 MIXED HYPERLIPIDEMIA: ICD-10-CM

## 2023-03-20 RX ORDER — LORATADINE 10 MG/1
10 TABLET ORAL DAILY
Qty: 30 TABLET | Refills: 3 | Status: SHIPPED | OUTPATIENT
Start: 2023-03-20

## 2023-03-20 NOTE — ASSESSMENT & PLAN NOTE
Lab Results   Component Value Date    LDLCALC 72 03/13/2023   Cholesterol is under good control but she has some concern about Lipitor causing some discomfort in the joints, will reduce the dose further and advised to take every other day half tablet    Currently she is taking half tablet daily  If she continues having complains about joint pains we can consider changing the medication

## 2023-03-20 NOTE — ASSESSMENT & PLAN NOTE
Lab Results   Component Value Date    HGBA1C 6 1 (H) 03/13/2023   A1c remains 6 1 persistently, discussed with her about portion control and less carbs in her diet and start doing some exercise

## 2023-03-20 NOTE — PROGRESS NOTES
Name: Michi Olivo      : 1970      MRN: [id number]  Encounter Provider: Sherryle Savers, MD  Encounter Date: 3/20/2023   Encounter department: [facility]     1  Seasonal allergic rhinitis due to pollen  Assessment & Plan:  She says allergy season is coming she cannot tolerate montelukast, she needs something else, discussed with her to take the loratadine, generic for Claritin    Orders:  -     loratadine (CLARITIN) 10 mg tablet; Take 1 tablet (10 mg total) by mouth daily    2  Mild intermittent asthma without complication    3  Prediabetes  Assessment & Plan:  Lab Results   Component Value Date    HGBA1C 6 1 (H) 2023   A1c remains 6 1 persistently, discussed with her about portion control and less carbs in her diet and start doing some exercise      Orders:  -     CBC and differential; Future; Expected date: 2023  -     Comprehensive metabolic panel; Future; Expected date: 2023  -     Hemoglobin A1C; Future; Expected date: 2023  -     Lipid panel; Future; Expected date: 2023    4  Mixed hyperlipidemia  Assessment & Plan:  Lab Results   Component Value Date    LDLCALC 72 2023   Cholesterol is under good control but she has some concern about Lipitor causing some discomfort in the joints, will reduce the dose further and advised to take every other day half tablet  Currently she is taking half tablet daily  If she continues having complains about joint pains we can consider changing the medication      Orders:  -     Comprehensive metabolic panel; Future; Expected date: 2023  -     Lipid panel; Future; Expected date: 2023      BMI Counseling: Body mass index is 27 1 kg/m²  The BMI is above normal  Nutrition recommendations include decreasing portion sizes, decreasing fast food intake, consuming healthier snacks, limiting drinks that contain sugar, moderation in carbohydrate intake and reducing intake of cholesterol  Exercise recommendations include exercising 3-5 times per week  Rationale for BMI follow-up plan is due to patient being overweight or obese  Depression Screening and Follow-up Plan: Patient was screened for depression during today's encounter  They screened negative with a PHQ-2 score of 0  Subjective     She is here for follow-up, she is taking Lipitor half tablet daily, previously she says she got more muscle pain when she was taking the full tablet  She is better but she says still feels ankle pain and sometimes feel joint hurt and noted slight swelling on the ankles, she also when she has to stand long hours, and she thinks this ankle and knee pain could be still due to Lipitor  Cholesterol is significantly improved  She is prediabetic she says she eat brown rice almost daily and banana daily  No exercise    Review of Systems   Constitutional: Negative for activity change, appetite change, chills, fatigue, fever and unexpected weight change  HENT: Negative for congestion, ear discharge, ear pain, nosebleeds, postnasal drip, rhinorrhea, sinus pressure, sneezing, sore throat, trouble swallowing and voice change  Eyes: Negative for photophobia, pain, discharge, redness and itching  Respiratory: Negative for cough, chest tightness, shortness of breath and wheezing  Cardiovascular: Negative for chest pain, palpitations and leg swelling  Gastrointestinal: Negative for abdominal pain, constipation, diarrhea, nausea and vomiting  Endocrine: Negative for polyuria  Genitourinary: Negative for dysuria, frequency and urgency  Musculoskeletal: Negative for arthralgias, back pain, myalgias and neck pain  Ankle and knee pain   Skin: Negative for color change, pallor and rash  Allergic/Immunologic: Negative for environmental allergies and food allergies  Neurological: Negative for dizziness, weakness, light-headedness and headaches  Hematological: Negative for adenopathy   Does not bruise/bleed easily  Psychiatric/Behavioral: Negative for behavioral problems  The patient is not nervous/anxious  Past Medical History:   Diagnosis Date   • Leukocytosis     last assessed 7/31/17     Past Surgical History:   Procedure Laterality Date   • BLADDER SURGERY      bladder sling? approx 7 years   • CYSTOSCOPY      diagnostic,last assessed 10/17/17   • OTHER SURGICAL HISTORY      suprapublic sling     History reviewed  No pertinent family history  Social History     Socioeconomic History   • Marital status: Single     Spouse name: None   • Number of children: 4   • Years of education: None   • Highest education level: None   Occupational History   • None   Tobacco Use   • Smoking status: Never   • Smokeless tobacco: Never   Substance and Sexual Activity   • Alcohol use: Yes     Comment: social   • Drug use: No   • Sexual activity: None   Other Topics Concern   • None   Social History Narrative   • None     Social Determinants of Health     Financial Resource Strain: Not on file   Food Insecurity: Not on file   Transportation Needs: Not on file   Physical Activity: Not on file   Stress: Not on file   Social Connections: Not on file   Intimate Partner Violence: Not on file   Housing Stability: Not on file     Current Outpatient Medications on File Prior to Visit   Medication Sig   • albuterol (ProAir HFA) 90 mcg/act inhaler Inhale 2 puffs every 6 (six) hours as needed for shortness of breath   • atorvastatin (LIPITOR) 10 mg tablet Half tab po daily   • fluticasone (FLONASE) 50 mcg/act nasal spray 2 sprays into each nostril daily (Patient not taking: Reported on 10/1/2022)   • NON FORMULARY Apply 30 mL topically daily at bedtime Cleanse & apply 2 pumps to entire face at night or as directed by doctor   (Patient not taking: Reported on 10/1/2022)   • omeprazole (PriLOSEC) 20 mg delayed release capsule TAKE 1 CAPSULE BY MOUTH EVERY DAY (Patient not taking: Reported on 3/20/2023)     Allergies Allergen Reactions   • Pollen Extract      Immunization History   Administered Date(s) Administered   • Influenza Quadrivalent Preservative Free 3 years and older IM 11/02/2016, 11/20/2017   • Influenza, recombinant, quadrivalent,injectable, preservative free 11/27/2018, 11/26/2019, 09/15/2020   • Influenza, seasonal, injectable 10/18/2011, 11/05/2013   • TD (adult) Preservative Free 11/05/2013   • Tdap 11/05/2013       Objective     /78   Pulse 75   Resp 16   Ht 5' 3" (1 6 m)   Wt 69 4 kg (153 lb)   SpO2 98%   BMI 27 10 kg/m²     Physical Exam  Vitals and nursing note reviewed  Constitutional:       Appearance: Normal appearance  She is well-developed  She is not ill-appearing  HENT:      Head: Normocephalic and atraumatic  Eyes:      General: No scleral icterus  Conjunctiva/sclera: Conjunctivae normal       Pupils: Pupils are equal, round, and reactive to light  Neck:      Thyroid: No thyromegaly  Cardiovascular:      Rate and Rhythm: Normal rate and regular rhythm  Heart sounds: Normal heart sounds  No murmur heard  Pulmonary:      Effort: Pulmonary effort is normal       Breath sounds: Normal breath sounds  No wheezing or rales  Abdominal:      General: There is no distension  Palpations: Abdomen is soft  There is no mass  Musculoskeletal:      Cervical back: Normal range of motion and neck supple  Right lower leg: No edema  Left lower leg: No edema  Lymphadenopathy:      Cervical: No cervical adenopathy  Skin:     Findings: No bruising, erythema or rash  Neurological:      General: No focal deficit present  Mental Status: She is alert     Psychiatric:         Mood and Affect: Mood normal        Clayton Tsang MD

## 2023-03-20 NOTE — ASSESSMENT & PLAN NOTE
She says allergy season is coming she cannot tolerate montelukast, she needs something else, discussed with her to take the loratadine, generic for Claritin

## 2023-05-19 ENCOUNTER — OFFICE VISIT (OUTPATIENT)
Dept: URGENT CARE | Facility: CLINIC | Age: 53
End: 2023-05-19

## 2023-05-19 VITALS
RESPIRATION RATE: 18 BRPM | HEIGHT: 63 IN | TEMPERATURE: 97.2 F | SYSTOLIC BLOOD PRESSURE: 133 MMHG | WEIGHT: 153 LBS | OXYGEN SATURATION: 99 % | HEART RATE: 74 BPM | DIASTOLIC BLOOD PRESSURE: 81 MMHG | BODY MASS INDEX: 27.11 KG/M2

## 2023-05-19 DIAGNOSIS — R31.9 URINARY TRACT INFECTION WITH HEMATURIA, SITE UNSPECIFIED: Primary | ICD-10-CM

## 2023-05-19 DIAGNOSIS — N39.0 URINARY TRACT INFECTION WITH HEMATURIA, SITE UNSPECIFIED: Primary | ICD-10-CM

## 2023-05-19 DIAGNOSIS — R42 VERTIGO: ICD-10-CM

## 2023-05-19 LAB
SL AMB  POCT GLUCOSE, UA: NEGATIVE
SL AMB LEUKOCYTE ESTERASE,UA: ABNORMAL
SL AMB POCT BILIRUBIN,UA: NEGATIVE
SL AMB POCT BLOOD,UA: ABNORMAL
SL AMB POCT CLARITY,UA: ABNORMAL
SL AMB POCT COLOR,UA: YELLOW
SL AMB POCT KETONES,UA: NEGATIVE
SL AMB POCT NITRITE,UA: NEGATIVE
SL AMB POCT PH,UA: 6
SL AMB POCT SPECIFIC GRAVITY,UA: 1.01
SL AMB POCT URINE PROTEIN: NEGATIVE
SL AMB POCT UROBILINOGEN: 0.2

## 2023-05-19 RX ORDER — MECLIZINE HYDROCHLORIDE 25 MG/1
25 TABLET ORAL 3 TIMES DAILY PRN
Qty: 30 TABLET | Refills: 0 | Status: SHIPPED | OUTPATIENT
Start: 2023-05-19

## 2023-05-19 RX ORDER — NITROFURANTOIN 25; 75 MG/1; MG/1
100 CAPSULE ORAL 2 TIMES DAILY
Qty: 14 CAPSULE | Refills: 0 | Status: SHIPPED | OUTPATIENT
Start: 2023-05-19 | End: 2023-05-26

## 2023-05-19 NOTE — PROGRESS NOTES
[facility]        NAME: Astrid Tapia is a 45 y o  female  : 1970    MRN: [id number]  DATE: May 19, 2023  TIME: 1:04 PM    Assessment and Plan   Urinary tract infection with hematuria, site unspecified [N39 0, R31 9]  1  Urinary tract infection with hematuria, site unspecified  Urine culture    POCT urine dip    nitrofurantoin (MACROBID) 100 mg capsule      2  Vertigo  meclizine (ANTIVERT) 25 mg tablet      Pt presents with symptoms and exam consistent with UTI  UA confirms will start on Macrobid to treat  Additionally presents with symptoms consistent with vertigo, likely BPPV  Rx for meclizine for when symptoms occur, follow-up with PCP in 3-5 days to determine if any additional treatment or testing may be warrented  Neurologically stable with no signs of stroke and BP within normal limits, no h/o HTN  Patient Instructions     Patient Instructions   • Take antibiotics as prescribed  Complete the entire course  If you do not complete the entire course this may result in bacterial resistance making future infections very difficult or impossible to treat  You should never have leftover antibiotics unless your antibiotic is switched  Note that if you are taking birth control medications, antibiotics can decrease their effectiveness  Recommend abstinence or back up method while on antibiotics and for 3-5 days after completing the antibiotic course  • We send all positive urine for culture, we will call you if your antibiotic needs to be changed based on culture results  • If symptoms do not improve in 2-3 days, follow-up with your primary care provider  • If you develop fever, chills, or worsening low back pain report to the emergency room  These are symptoms of a more serious condition or possible spread of the infection into your bloodstream      Take Meclizine as needed for dizziness  Follow-up with PCP regarding     If worsens or becomes more persistent, report to the emergency department immediately  Follow up with PCP in 3-5 days  Proceed to  ER if symptoms worsen  Chief Complaint     Chief Complaint   Patient presents with   • Possible UTI     Have some urine burning, and frequency  History of Present Illness       45year old female presents with complaint of burning with urination, frequency of urination and urgency for 2 days  Pt also notes that she has been getting very dizzy (room spinning around her) when she stands up  This was present before urinary symptoms  She notes that she usually lies back down and goes to sleep when it occurs and symptoms resolve within 1-2 hours of this  Standing up is the only time the symptoms occur and it does not happen every time she stands up per her report  Review of Systems   Review of Systems   Constitutional: Negative for chills and fever  Eyes: Negative for photophobia and visual disturbance  Gastrointestinal: Negative for abdominal pain, nausea and vomiting  Genitourinary: Positive for dysuria, frequency, hematuria and urgency  Musculoskeletal: Negative for back pain  Neurological: Positive for dizziness  Negative for weakness, light-headedness, numbness and headaches           Current Medications       Current Outpatient Medications:   •  atorvastatin (LIPITOR) 10 mg tablet, Half tab po daily, Disp: 45 tablet, Rfl: 3  •  fluticasone (FLONASE) 50 mcg/act nasal spray, 2 sprays into each nostril daily, Disp: 16 g, Rfl: 2  •  loratadine (CLARITIN) 10 mg tablet, TAKE 1 TABLET BY MOUTH EVERY DAY, Disp: 90 tablet, Rfl: 2  •  meclizine (ANTIVERT) 25 mg tablet, Take 1 tablet (25 mg total) by mouth 3 (three) times a day as needed for dizziness, Disp: 30 tablet, Rfl: 0  •  nitrofurantoin (MACROBID) 100 mg capsule, Take 1 capsule (100 mg total) by mouth 2 (two) times a day for 7 days, Disp: 14 capsule, Rfl: 0  •  omeprazole (PriLOSEC) 20 mg delayed release capsule, TAKE 1 CAPSULE BY MOUTH EVERY DAY, Disp: 90 capsule, "Rfl: 0  •  albuterol (ProAir HFA) 90 mcg/act inhaler, Inhale 2 puffs every 6 (six) hours as needed for shortness of breath (Patient not taking: Reported on 5/19/2023), Disp: 18 g, Rfl: 1  •  NON FORMULARY, Apply 30 mL topically daily at bedtime Cleanse & apply 2 pumps to entire face at night or as directed by doctor  (Patient not taking: Reported on 10/1/2022), Disp: , Rfl:     Current Allergies     Allergies as of 05/19/2023 - Reviewed 05/19/2023   Allergen Reaction Noted   • Pollen extract  05/16/2018            The following portions of the patient's history were reviewed and updated as appropriate: allergies, current medications, past family history, past medical history, past social history, past surgical history and problem list      Past Medical History:   Diagnosis Date   • Leukocytosis     last assessed 7/31/17       Past Surgical History:   Procedure Laterality Date   • BLADDER SURGERY      bladder sling? approx 7 years   • CYSTOSCOPY      diagnostic,last assessed 10/17/17   • OTHER SURGICAL HISTORY      suprapublic sling       No family history on file  Medications have been verified  Objective   /81   Pulse 74   Temp (!) 97 2 °F (36 2 °C) (Temporal)   Resp 18   Ht 5' 3\" (1 6 m)   Wt 69 4 kg (153 lb)   SpO2 99%   BMI 27 10 kg/m²   No LMP recorded  Patient is postmenopausal        Physical Exam     Physical Exam  Vitals and nursing note reviewed  Constitutional:       General: She is awake  She is not in acute distress  Appearance: Normal appearance  She is well-developed and well-groomed  She is not ill-appearing, toxic-appearing or diaphoretic  HENT:      Head: Normocephalic and atraumatic  Right Ear: Hearing and external ear normal       Left Ear: Hearing and external ear normal    Eyes:      General: Lids are normal  Vision grossly intact  Gaze aligned appropriately  Cardiovascular:      Rate and Rhythm: Normal rate     Pulmonary:      Effort: Pulmonary effort is " "normal    Abdominal:      General: Abdomen is flat  Bowel sounds are normal       Palpations: Abdomen is soft  Tenderness: There is no abdominal tenderness  There is no right CVA tenderness or left CVA tenderness  Musculoskeletal:      Cervical back: Normal range of motion  Skin:     General: Skin is warm and dry  Neurological:      Mental Status: She is alert and oriented to person, place, and time  GCS: GCS eye subscore is 4  GCS verbal subscore is 5  GCS motor subscore is 6  Cranial Nerves: Cranial nerves 2-12 are intact  Sensory: Sensation is intact  Motor: Motor function is intact  Coordination: Coordination is intact  Gait: Gait is intact  Deep Tendon Reflexes: Reflexes are normal and symmetric  Psychiatric:         Attention and Perception: Attention and perception normal          Mood and Affect: Mood and affect normal          Speech: Speech normal          Behavior: Behavior normal  Behavior is cooperative  Note: Portions of this record may have been created with voice recognition software  Occasional wrong word or \"sound a like\" substitutions may have occurred due to the inherent limitations of voice recognition software  Please read the chart carefully and recognize, using context, where substitutions have occurred  *      "

## 2023-05-19 NOTE — PATIENT INSTRUCTIONS
Take antibiotics as prescribed  Complete the entire course  If you do not complete the entire course this may result in bacterial resistance making future infections very difficult or impossible to treat  You should never have leftover antibiotics unless your antibiotic is switched  Note that if you are taking birth control medications, antibiotics can decrease their effectiveness  Recommend abstinence or back up method while on antibiotics and for 3-5 days after completing the antibiotic course  We send all positive urine for culture, we will call you if your antibiotic needs to be changed based on culture results  If symptoms do not improve in 2-3 days, follow-up with your primary care provider  If you develop fever, chills, or worsening low back pain report to the emergency room  These are symptoms of a more serious condition or possible spread of the infection into your bloodstream      Take Meclizine as needed for dizziness  Follow-up with PCP regarding  If worsens or becomes more persistent, report to the emergency department immediately

## 2023-05-21 LAB
BACTERIA UR CULT: ABNORMAL
BACTERIA UR CULT: ABNORMAL

## 2023-05-22 LAB
BACTERIA UR CULT: ABNORMAL
BACTERIA UR CULT: ABNORMAL

## 2023-09-06 DIAGNOSIS — E78.2 MIXED HYPERLIPIDEMIA: ICD-10-CM

## 2023-09-06 RX ORDER — ATORVASTATIN CALCIUM 10 MG/1
TABLET, FILM COATED ORAL
Qty: 45 TABLET | Refills: 1 | Status: SHIPPED | OUTPATIENT
Start: 2023-09-06

## 2023-09-11 ENCOUNTER — LAB (OUTPATIENT)
Dept: LAB | Facility: CLINIC | Age: 53
End: 2023-09-11
Payer: COMMERCIAL

## 2023-09-11 DIAGNOSIS — R73.03 PREDIABETES: ICD-10-CM

## 2023-09-11 DIAGNOSIS — E78.2 MIXED HYPERLIPIDEMIA: ICD-10-CM

## 2023-09-11 LAB
ALBUMIN SERPL BCP-MCNC: 4.5 G/DL (ref 3.5–5)
ALP SERPL-CCNC: 43 U/L (ref 34–104)
ALT SERPL W P-5'-P-CCNC: 24 U/L (ref 7–52)
ANION GAP SERPL CALCULATED.3IONS-SCNC: 6 MMOL/L
AST SERPL W P-5'-P-CCNC: 20 U/L (ref 13–39)
BASOPHILS # BLD AUTO: 0.01 THOUSANDS/ÂΜL (ref 0–0.1)
BASOPHILS NFR BLD AUTO: 0 % (ref 0–1)
BILIRUB SERPL-MCNC: 0.68 MG/DL (ref 0.2–1)
BUN SERPL-MCNC: 10 MG/DL (ref 5–25)
CALCIUM SERPL-MCNC: 9.5 MG/DL (ref 8.4–10.2)
CHLORIDE SERPL-SCNC: 101 MMOL/L (ref 96–108)
CHOLEST SERPL-MCNC: 161 MG/DL
CO2 SERPL-SCNC: 32 MMOL/L (ref 21–32)
CREAT SERPL-MCNC: 0.71 MG/DL (ref 0.6–1.3)
EOSINOPHIL # BLD AUTO: 0.07 THOUSAND/ÂΜL (ref 0–0.61)
EOSINOPHIL NFR BLD AUTO: 2 % (ref 0–6)
ERYTHROCYTE [DISTWIDTH] IN BLOOD BY AUTOMATED COUNT: 11.9 % (ref 11.6–15.1)
EST. AVERAGE GLUCOSE BLD GHB EST-MCNC: 134 MG/DL
GFR SERPL CREATININE-BSD FRML MDRD: 96 ML/MIN/1.73SQ M
GLUCOSE P FAST SERPL-MCNC: 93 MG/DL (ref 65–99)
HBA1C MFR BLD: 6.3 %
HCT VFR BLD AUTO: 46.4 % (ref 34.8–46.1)
HDLC SERPL-MCNC: 51 MG/DL
HGB BLD-MCNC: 14.8 G/DL (ref 11.5–15.4)
IMM GRANULOCYTES # BLD AUTO: 0.01 THOUSAND/UL (ref 0–0.2)
IMM GRANULOCYTES NFR BLD AUTO: 0 % (ref 0–2)
LDLC SERPL CALC-MCNC: 100 MG/DL (ref 0–100)
LYMPHOCYTES # BLD AUTO: 1.68 THOUSANDS/ÂΜL (ref 0.6–4.47)
LYMPHOCYTES NFR BLD AUTO: 45 % (ref 14–44)
MCH RBC QN AUTO: 29.7 PG (ref 26.8–34.3)
MCHC RBC AUTO-ENTMCNC: 31.9 G/DL (ref 31.4–37.4)
MCV RBC AUTO: 93 FL (ref 82–98)
MONOCYTES # BLD AUTO: 0.28 THOUSAND/ÂΜL (ref 0.17–1.22)
MONOCYTES NFR BLD AUTO: 8 % (ref 4–12)
NEUTROPHILS # BLD AUTO: 1.65 THOUSANDS/ÂΜL (ref 1.85–7.62)
NEUTS SEG NFR BLD AUTO: 45 % (ref 43–75)
NONHDLC SERPL-MCNC: 110 MG/DL
NRBC BLD AUTO-RTO: 0 /100 WBCS
PLATELET # BLD AUTO: 186 THOUSANDS/UL (ref 149–390)
PMV BLD AUTO: 11.7 FL (ref 8.9–12.7)
POTASSIUM SERPL-SCNC: 3.7 MMOL/L (ref 3.5–5.3)
PROT SERPL-MCNC: 7.1 G/DL (ref 6.4–8.4)
RBC # BLD AUTO: 4.98 MILLION/UL (ref 3.81–5.12)
SODIUM SERPL-SCNC: 139 MMOL/L (ref 135–147)
TRIGL SERPL-MCNC: 51 MG/DL
WBC # BLD AUTO: 3.7 THOUSAND/UL (ref 4.31–10.16)

## 2023-09-11 PROCEDURE — 36415 COLL VENOUS BLD VENIPUNCTURE: CPT

## 2023-09-11 PROCEDURE — 85025 COMPLETE CBC W/AUTO DIFF WBC: CPT

## 2023-09-11 PROCEDURE — 83036 HEMOGLOBIN GLYCOSYLATED A1C: CPT

## 2023-09-11 PROCEDURE — 80053 COMPREHEN METABOLIC PANEL: CPT

## 2023-09-11 PROCEDURE — 80061 LIPID PANEL: CPT

## 2023-09-25 ENCOUNTER — LAB (OUTPATIENT)
Dept: LAB | Facility: CLINIC | Age: 53
End: 2023-09-25
Payer: COMMERCIAL

## 2023-09-25 ENCOUNTER — OFFICE VISIT (OUTPATIENT)
Dept: FAMILY MEDICINE CLINIC | Facility: CLINIC | Age: 53
End: 2023-09-25
Payer: COMMERCIAL

## 2023-09-25 VITALS
WEIGHT: 148 LBS | SYSTOLIC BLOOD PRESSURE: 118 MMHG | HEIGHT: 63 IN | OXYGEN SATURATION: 100 % | HEART RATE: 72 BPM | RESPIRATION RATE: 16 BRPM | BODY MASS INDEX: 26.22 KG/M2 | DIASTOLIC BLOOD PRESSURE: 70 MMHG

## 2023-09-25 DIAGNOSIS — Z12.12 SCREENING FOR COLORECTAL CANCER: ICD-10-CM

## 2023-09-25 DIAGNOSIS — Z00.00 ANNUAL PHYSICAL EXAM: Primary | ICD-10-CM

## 2023-09-25 DIAGNOSIS — E78.2 MIXED HYPERLIPIDEMIA: ICD-10-CM

## 2023-09-25 DIAGNOSIS — J45.20 MILD INTERMITTENT ASTHMA WITHOUT COMPLICATION: ICD-10-CM

## 2023-09-25 DIAGNOSIS — K21.9 GASTROESOPHAGEAL REFLUX DISEASE WITHOUT ESOPHAGITIS: ICD-10-CM

## 2023-09-25 DIAGNOSIS — R22.1 NECK MASS: ICD-10-CM

## 2023-09-25 DIAGNOSIS — J30.1 SEASONAL ALLERGIC RHINITIS DUE TO POLLEN: ICD-10-CM

## 2023-09-25 DIAGNOSIS — R73.03 PREDIABETES: ICD-10-CM

## 2023-09-25 DIAGNOSIS — Z12.11 SCREENING FOR COLORECTAL CANCER: ICD-10-CM

## 2023-09-25 LAB
T3FREE SERPL-MCNC: 2.85 PG/ML (ref 2.5–3.9)
TSH SERPL DL<=0.05 MIU/L-ACNC: 2.19 UIU/ML (ref 0.45–4.5)

## 2023-09-25 PROCEDURE — 99396 PREV VISIT EST AGE 40-64: CPT | Performed by: FAMILY MEDICINE

## 2023-09-25 PROCEDURE — 36415 COLL VENOUS BLD VENIPUNCTURE: CPT

## 2023-09-25 PROCEDURE — 84481 FREE ASSAY (FT-3): CPT

## 2023-09-25 PROCEDURE — 99213 OFFICE O/P EST LOW 20 MIN: CPT | Performed by: FAMILY MEDICINE

## 2023-09-25 PROCEDURE — 84443 ASSAY THYROID STIM HORMONE: CPT

## 2023-09-25 RX ORDER — LORATADINE 10 MG/1
10 TABLET ORAL DAILY
Qty: 90 TABLET | Refills: 2 | Status: SHIPPED | OUTPATIENT
Start: 2023-09-25

## 2023-09-25 RX ORDER — FLUTICASONE PROPIONATE 50 MCG
2 SPRAY, SUSPENSION (ML) NASAL DAILY
Qty: 16 G | Refills: 2 | Status: SHIPPED | OUTPATIENT
Start: 2023-09-25

## 2023-09-25 RX ORDER — OMEPRAZOLE 20 MG/1
20 CAPSULE, DELAYED RELEASE ORAL DAILY
Qty: 90 CAPSULE | Refills: 3 | Status: SHIPPED | OUTPATIENT
Start: 2023-09-25

## 2023-09-25 NOTE — PROGRESS NOTES
ADULT ANNUAL [facility]    NAME: Clay Pulido  AGE: 45 y.o. SEX: female  : 1970     DATE: 2023     Assessment and Plan:     Problem List Items Addressed This Visit        Digestive    Gastroesophageal reflux disease without esophagitis    Relevant Medications    omeprazole (PriLOSEC) 20 mg delayed release capsule       Respiratory    Allergic rhinitis    Relevant Medications    loratadine (CLARITIN) 10 mg tablet    fluticasone (FLONASE) 50 mcg/act nasal spray    Asthma, mild intermittent    Relevant Medications    fluticasone (FLONASE) 50 mcg/act nasal spray       Other    Hyperlipidemia     She takes half tablet Lipitor twice a week and she says she cannot take more than that as she feels her ankles swell up if she takes every day  She can continue same way her cholesterol is stable. Lab Results   Component Value Date    LDLCALC 100 2023              Relevant Orders    Lipid panel    Screening for colorectal cancer - Primary    Relevant Orders    Ambulatory referral to Gastroenterology    Prediabetes     Lab Results   Component Value Date    HGBA1C 6.3 (H) 2023   Fasting sugar is normal but A1c is still high, discussed about cutting down on sugar and do low carb diet           Relevant Orders    Comprehensive metabolic panel    Hemoglobin A1C    BMI 26.0-26.9,adult    Neck mass     She has a palpable nodule about 1 cm on the left side of the neck on the lateral part of the thyroid area, nontender advised to get the ultrasound of the neck and the thyroid ultrasound and get the TSH and T3 level         Relevant Orders    US head neck soft tissue    US thyroid    CBC and differential    TSH, 3rd generation    T3, free   Give her the importance of getting colonoscopy, mammogram and Pap smear, she keeps saying next year, orders are placed    Immunizations and preventive care screenings were discussed with patient today. Appropriate education was printed on patient's after visit summary. Counseling:  Dental Health: discussed importance of regular tooth brushing, flossing, and dental visits. Exercise: the importance of regular exercise/physical activity was discussed. Recommend exercise 3-5 times per week for at least 30 minutes. Depression Screening and Follow-up Plan: Patient was screened for depression during today's encounter. They screened negative with a PHQ-2 score of 0. Discussed about the shingles vaccine which she refused and she does not want the flu vaccine    Return in about 2 weeks (around 10/9/2023) for Recheck. Chief Complaint:     Chief Complaint   Patient presents with   • Annual Exam   • Follow-up      History of Present Illness:     Adult Annual Physical   Patient here for a comprehensive physical exam. The patient reports problems - Seasonal allergies stable on Claritin and needs a refill, she gets acid reflux and she is not taking omeprazole. Diet and Physical Activity  Diet/Nutrition: well balanced diet. Exercise: no formal exercise. Depression Screening  PHQ-2/9 Depression Screening    Little interest or pleasure in doing things: 0 - not at all  Feeling down, depressed, or hopeless: 0 - not at all  PHQ-2 Score: 0  PHQ-2 Interpretation: Negative depression screen       General Health  Sleep: sleeps well. Hearing: normal - bilateral.  Vision: vision problems: Slight problem in seeing she will get her eye exam.   Dental: regular dental visits. /GYN Health  Patient is: postmenopausal  Last Pap done last year was showing not enough cells and patient is advised to get the Pap smear again this year  , but she refused and she says she will plan next year     Review of Systems:     Review of Systems   Constitutional: Negative for activity change, appetite change, chills, fatigue, fever and unexpected weight change.    HENT: Negative for congestion, ear discharge, ear pain, nosebleeds, postnasal drip, rhinorrhea, sinus pressure, sneezing, sore throat, trouble swallowing and voice change. Eyes: Negative for photophobia, pain, discharge, redness and itching. Respiratory: Negative for cough, chest tightness, shortness of breath and wheezing. Cardiovascular: Negative for chest pain, palpitations and leg swelling. Gastrointestinal: Negative for abdominal pain, constipation, diarrhea, nausea and vomiting. Endocrine: Positive for polydipsia. Negative for polyuria. Genitourinary: Negative for dysuria, frequency and urgency. Musculoskeletal: Negative for arthralgias, back pain, myalgias and neck pain. Skin: Negative for color change, pallor and rash. Allergic/Immunologic: Negative for environmental allergies and food allergies. Neurological: Negative for dizziness, weakness, light-headedness and headaches. Hematological: Negative for adenopathy. Does not bruise/bleed easily. Psychiatric/Behavioral: Negative for behavioral problems and sleep disturbance. The patient is not nervous/anxious. Past Medical History:     Past Medical History:   Diagnosis Date   • Leukocytosis     last assessed 7/31/17      Past Surgical History:     Past Surgical History:   Procedure Laterality Date   • BLADDER SURGERY      bladder sling?  approx 7 years   • CYSTOSCOPY      diagnostic,last assessed 10/17/17   • OTHER SURGICAL HISTORY      suprapublic sling      Social History:     Social History     Socioeconomic History   • Marital status: Single     Spouse name: None   • Number of children: 4   • Years of education: None   • Highest education level: None   Occupational History   • None   Tobacco Use   • Smoking status: Never   • Smokeless tobacco: Never   Substance and Sexual Activity   • Alcohol use: Yes     Comment: social   • Drug use: No   • Sexual activity: None   Other Topics Concern   • None   Social History Narrative   • None     Social Determinants of Health     Financial Resource Strain: Not on file   Food Insecurity: Not on file   Transportation Needs: Not on file   Physical Activity: Not on file   Stress: Not on file   Social Connections: Not on file   Intimate Partner Violence: Not on file   Housing Stability: Not on file      Family History:     History reviewed. No pertinent family history. Current Medications:     Current Outpatient Medications   Medication Sig Dispense Refill   • atorvastatin (LIPITOR) 10 mg tablet TAKE 1/2 TABLET BY MOUTH DAILY 45 tablet 1   • fluticasone (FLONASE) 50 mcg/act nasal spray 2 sprays into each nostril daily 16 g 2   • loratadine (CLARITIN) 10 mg tablet Take 1 tablet (10 mg total) by mouth daily 90 tablet 2   • omeprazole (PriLOSEC) 20 mg delayed release capsule Take 1 capsule (20 mg total) by mouth daily 90 capsule 3   • albuterol (ProAir HFA) 90 mcg/act inhaler Inhale 2 puffs every 6 (six) hours as needed for shortness of breath (Patient not taking: Reported on 5/19/2023) 18 g 1   • NON FORMULARY Apply 30 mL topically daily at bedtime Cleanse & apply 2 pumps to entire face at night or as directed by doctor. (Patient not taking: Reported on 10/1/2022)       No current facility-administered medications for this visit. Allergies: Allergies   Allergen Reactions   • Pollen Extract       Physical Exam:     /70   Pulse 72   Resp 16   Ht 5' 3" (1.6 m)   Wt 67.1 kg (148 lb)   SpO2 100%   BMI 26.22 kg/m²     Physical Exam  Vitals and nursing note reviewed. Constitutional:       General: She is not in acute distress. Appearance: Normal appearance. She is not ill-appearing. HENT:      Head: Normocephalic and atraumatic. Right Ear: Tympanic membrane and ear canal normal. There is impacted cerumen. Left Ear: Tympanic membrane and ear canal normal. There is impacted cerumen. Nose: Nose normal. No congestion or rhinorrhea. Mouth/Throat:      Mouth: Mucous membranes are moist.      Pharynx: Oropharynx is clear. No oropharyngeal exudate or posterior oropharyngeal erythema. Eyes:      General: No scleral icterus. Right eye: No discharge. Left eye: No discharge. Extraocular Movements: Extraocular movements intact. Conjunctiva/sclera: Conjunctivae normal.      Pupils: Pupils are equal, round, and reactive to light. Cardiovascular:      Rate and Rhythm: Normal rate and regular rhythm. Heart sounds: Normal heart sounds. No murmur heard. Pulmonary:      Effort: Pulmonary effort is normal.      Breath sounds: Normal breath sounds. No wheezing or rales. Abdominal:      General: Abdomen is flat. Bowel sounds are normal. There is no distension. Palpations: Abdomen is soft. There is no mass. Tenderness: There is no abdominal tenderness. Musculoskeletal:         General: No swelling, tenderness or deformity. Normal range of motion. Cervical back: Normal range of motion and neck supple. No muscular tenderness. Right lower leg: No edema. Left lower leg: No edema. Lymphadenopathy:      Cervical: No cervical adenopathy. Skin:     Coloration: Skin is not jaundiced or pale. Findings: No erythema, lesion or rash. Neurological:      General: No focal deficit present. Mental Status: She is alert and oriented to person, place, and time.       Gait: Gait normal.   Psychiatric:         Mood and Affect: Mood normal.         Behavior: Behavior normal.          Kelli Montejo MD  [facility]

## 2023-09-25 NOTE — ASSESSMENT & PLAN NOTE
Lab Results   Component Value Date    HGBA1C 6.3 (H) 09/11/2023   Fasting sugar is normal but A1c is still high, discussed about cutting down on sugar and do low carb diet

## 2023-09-25 NOTE — ASSESSMENT & PLAN NOTE
She has a palpable nodule about 1 cm on the left side of the neck on the lateral part of the thyroid area, nontender advised to get the ultrasound of the neck and the thyroid ultrasound and get the TSH and T3 level

## 2023-09-29 ENCOUNTER — HOSPITAL ENCOUNTER (OUTPATIENT)
Dept: ULTRASOUND IMAGING | Facility: HOSPITAL | Age: 53
Discharge: HOME | End: 2023-09-29
Attending: FAMILY MEDICINE
Payer: COMMERCIAL

## 2023-09-29 ENCOUNTER — TELEPHONE (OUTPATIENT)
Dept: FAMILY MEDICINE CLINIC | Facility: CLINIC | Age: 53
End: 2023-09-29

## 2023-09-29 DIAGNOSIS — R22.1 NECK MASS: ICD-10-CM

## 2023-09-29 PROCEDURE — 76536 US EXAM OF HEAD AND NECK: CPT

## 2023-09-29 NOTE — TELEPHONE ENCOUNTER
----- Message from Ingrid Leach MD sent at 9/26/2023 10:06 AM EDT -----  Please inform the patient her thyroid function test is normal

## 2023-10-09 ENCOUNTER — OFFICE VISIT (OUTPATIENT)
Dept: FAMILY MEDICINE CLINIC | Facility: CLINIC | Age: 53
End: 2023-10-09
Payer: COMMERCIAL

## 2023-10-09 VITALS
OXYGEN SATURATION: 100 % | BODY MASS INDEX: 26.22 KG/M2 | SYSTOLIC BLOOD PRESSURE: 110 MMHG | HEART RATE: 64 BPM | HEIGHT: 63 IN | RESPIRATION RATE: 16 BRPM | WEIGHT: 148 LBS | DIASTOLIC BLOOD PRESSURE: 70 MMHG

## 2023-10-09 DIAGNOSIS — E04.1 THYROID NODULE: Primary | ICD-10-CM

## 2023-10-09 DIAGNOSIS — J30.1 SEASONAL ALLERGIC RHINITIS DUE TO POLLEN: ICD-10-CM

## 2023-10-09 PROCEDURE — 99214 OFFICE O/P EST MOD 30 MIN: CPT | Performed by: FAMILY MEDICINE

## 2023-10-09 RX ORDER — LORATADINE 10 MG/1
10 TABLET ORAL DAILY
Qty: 90 TABLET | Refills: 2 | Status: SHIPPED | OUTPATIENT
Start: 2023-10-09

## 2023-10-09 NOTE — PROGRESS NOTES
Name: Richard Charles      : 1970      MRN: [id number]  Encounter Provider: Gallito Ellington MD  Encounter Date: 10/9/2023   Encounter department: [address] [place] [address]     1. Thyroid nodule  Assessment & Plan: On ultrasound she has a small thyroid nodule on the right side, which does not need follow-up or biopsy according to recommendation, and today there is no palpable nodule on her neck      2. Seasonal allergic rhinitis due to pollen  Assessment & Plan:  She will take Claritin when the seasonal allergies, she will also use the nasal spray as needed    Orders:  -     loratadine (CLARITIN) 10 mg tablet; Take 1 tablet (10 mg total) by mouth daily           Subjective     Follow-up on thyroid ultrasound, on last exam she was noted to have nodule palpable, she went for ultrasound and TSH level checked, he does not feel any pain in the throat or any difficulty in swallowing,  She says she get allergies mostly in the spring and summer and she has a no spray but she want to have Claritin to prevent allergies as she get itchy skin runny nose when she gets allergies    Review of Systems   Constitutional: Negative. HENT: Negative. Eyes: Negative. Respiratory: Negative. Cardiovascular: Negative. Gastrointestinal: Negative. Musculoskeletal: Negative. Skin: Negative. Neurological: Negative. Hematological: Negative. Psychiatric/Behavioral: Negative. Past Medical History:   Diagnosis Date   • Leukocytosis     last assessed 17     Past Surgical History:   Procedure Laterality Date   • BLADDER SURGERY      bladder sling? approx 7 years   • CYSTOSCOPY      diagnostic,last assessed 10/17/17   • OTHER SURGICAL HISTORY      suprapublic sling     History reviewed. No pertinent family history.   Social History     Socioeconomic History   • Marital status: Single     Spouse name: None   • Number of children: 4   • Years of education: None   • Highest education level: None   Occupational History   • None   Tobacco Use   • Smoking status: Never   • Smokeless tobacco: Never   Substance and Sexual Activity   • Alcohol use: Yes     Comment: social   • Drug use: No   • Sexual activity: None   Other Topics Concern   • None   Social History Narrative   • None     Social Determinants of Health     Financial Resource Strain: Not on file   Food Insecurity: Not on file   Transportation Needs: Not on file   Physical Activity: Not on file   Stress: Not on file   Social Connections: Not on file   Intimate Partner Violence: Not on file   Housing Stability: Not on file     Current Outpatient Medications on File Prior to Visit   Medication Sig   • albuterol (ProAir HFA) 90 mcg/act inhaler Inhale 2 puffs every 6 (six) hours as needed for shortness of breath (Patient not taking: Reported on 5/19/2023)   • atorvastatin (LIPITOR) 10 mg tablet TAKE 1/2 TABLET BY MOUTH DAILY   • fluticasone (FLONASE) 50 mcg/act nasal spray 2 sprays into each nostril daily   • NON FORMULARY Apply 30 mL topically daily at bedtime Cleanse & apply 2 pumps to entire face at night or as directed by doctor.  (Patient not taking: Reported on 10/1/2022)   • omeprazole (PriLOSEC) 20 mg delayed release capsule Take 1 capsule (20 mg total) by mouth daily   • [DISCONTINUED] loratadine (CLARITIN) 10 mg tablet Take 1 tablet (10 mg total) by mouth daily     Allergies   Allergen Reactions   • Pollen Extract      Immunization History   Administered Date(s) Administered   • Influenza Quadrivalent Preservative Free 3 years and older IM 11/02/2016, 11/20/2017   • Influenza, recombinant, quadrivalent,injectable, preservative free 11/27/2018, 11/26/2019, 09/15/2020   • Influenza, seasonal, injectable 10/18/2011, 11/05/2013   • TD (adult) Preservative Free 11/05/2013   • Tdap 11/05/2013       Objective     /70   Pulse 64   Resp 16   Ht 5' 3" (1.6 m)   Wt 67.1 kg (148 lb)   SpO2 100%   BMI 26.22 kg/m²     Physical Exam  Vitals and nursing note reviewed. Constitutional:       Appearance: Normal appearance. She is well-developed. She is not ill-appearing. HENT:      Head: Normocephalic and atraumatic. Right Ear: There is no impacted cerumen. Left Ear: There is no impacted cerumen. Nose: Nose normal.      Mouth/Throat:      Pharynx: No oropharyngeal exudate. Eyes:      General: No scleral icterus. Right eye: No discharge. Left eye: No discharge. Conjunctiva/sclera: Conjunctivae normal.      Pupils: Pupils are equal, round, and reactive to light. Neck:      Thyroid: No thyromegaly. Trachea: No tracheal deviation. Cardiovascular:      Rate and Rhythm: Normal rate and regular rhythm. Heart sounds: Normal heart sounds. No murmur heard. Pulmonary:      Effort: Pulmonary effort is normal. No respiratory distress. Breath sounds: Normal breath sounds. No wheezing or rales. Abdominal:      General: Bowel sounds are normal. There is no distension. Palpations: Abdomen is soft. There is no mass. Tenderness: There is no abdominal tenderness. There is no rebound. Musculoskeletal:         General: Normal range of motion. Cervical back: Normal range of motion and neck supple. Right lower leg: No edema. Left lower leg: No edema. Lymphadenopathy:      Cervical: No cervical adenopathy. Skin:     General: Skin is warm. Coloration: Skin is not pale. Findings: No erythema or rash. Neurological:      Mental Status: She is alert and oriented to person, place, and time. Cranial Nerves: No cranial nerve deficit. Deep Tendon Reflexes: Reflexes are normal and symmetric. Psychiatric:         Behavior: Behavior normal.         Thought Content:  Thought content normal.         Judgment: Judgment normal.       Lianne Medrano MD

## 2023-10-09 NOTE — ASSESSMENT & PLAN NOTE
On ultrasound she has a small thyroid nodule on the right side, which does not need follow-up or biopsy according to recommendation, and today there is no palpable nodule on her neck

## 2023-10-18 DIAGNOSIS — J30.1 SEASONAL ALLERGIC RHINITIS DUE TO POLLEN: ICD-10-CM

## 2023-10-18 DIAGNOSIS — J45.20 MILD INTERMITTENT ASTHMA WITHOUT COMPLICATION: ICD-10-CM

## 2023-10-18 RX ORDER — FLUTICASONE PROPIONATE 50 MCG
SPRAY, SUSPENSION (ML) NASAL
Qty: 48 ML | Refills: 1 | Status: SHIPPED | OUTPATIENT
Start: 2023-10-18

## 2023-10-23 ENCOUNTER — TELEPHONE (OUTPATIENT)
Age: 53
End: 2023-10-23

## 2023-10-23 NOTE — TELEPHONE ENCOUNTER
New Patient    What is the reason for the patient’s appointment?: Patient's  called stating patient is having symptoms of uti . She has recurrent utis. She will call her family doctor for treatment but wants to follow up with urology for it.     What office location does the patient prefer?:[place]      Does patient have Imaging/Lab Results:    Have patient records been requested?:  If No, are the records showing in Epic:       INSURANCE:   Do we accept the patient's insurance or is the patient Self-Pay?:    Insurance Provider:blue cross /blue shield   Plan Type/Number:   Member ID#:       HISTORY:   Has the patient had any previous Urologist(s)?:2019    Was the patient seen in the ED?:    Has the patient had any outside testing done?:    Does the patient have a personal history of cancer?:no

## 2023-10-24 ENCOUNTER — OFFICE VISIT (OUTPATIENT)
Dept: FAMILY MEDICINE CLINIC | Facility: CLINIC | Age: 53
End: 2023-10-24
Payer: COMMERCIAL

## 2023-10-24 VITALS
RESPIRATION RATE: 16 BRPM | DIASTOLIC BLOOD PRESSURE: 70 MMHG | TEMPERATURE: 97.8 F | OXYGEN SATURATION: 99 % | BODY MASS INDEX: 26.22 KG/M2 | HEART RATE: 75 BPM | SYSTOLIC BLOOD PRESSURE: 110 MMHG | WEIGHT: 148 LBS | HEIGHT: 63 IN

## 2023-10-24 DIAGNOSIS — R73.03 PREDIABETES: ICD-10-CM

## 2023-10-24 DIAGNOSIS — R39.15 URGENCY OF URINATION: ICD-10-CM

## 2023-10-24 DIAGNOSIS — R35.0 FREQUENCY OF MICTURITION: Primary | ICD-10-CM

## 2023-10-24 LAB
SL AMB  POCT GLUCOSE, UA: NORMAL
SL AMB LEUKOCYTE ESTERASE,UA: NORMAL
SL AMB POCT BILIRUBIN,UA: NORMAL
SL AMB POCT BLOOD,UA: NORMAL
SL AMB POCT CLARITY,UA: CLEAR
SL AMB POCT COLOR,UA: YELLOW
SL AMB POCT KETONES,UA: NORMAL
SL AMB POCT NITRITE,UA: NORMAL
SL AMB POCT PH,UA: 8
SL AMB POCT SPECIFIC GRAVITY,UA: 1
SL AMB POCT URINE PROTEIN: NORMAL
SL AMB POCT UROBILINOGEN: NORMAL

## 2023-10-24 PROCEDURE — 99214 OFFICE O/P EST MOD 30 MIN: CPT | Performed by: FAMILY MEDICINE

## 2023-10-24 PROCEDURE — 81003 URINALYSIS AUTO W/O SCOPE: CPT | Performed by: FAMILY MEDICINE

## 2023-10-24 RX ORDER — OXYBUTYNIN CHLORIDE 5 MG/1
5 TABLET, EXTENDED RELEASE ORAL DAILY
Qty: 30 TABLET | Refills: 3 | Status: SHIPPED | OUTPATIENT
Start: 2023-10-24

## 2023-10-24 NOTE — ASSESSMENT & PLAN NOTE
Her urine dipstick is normal, she has made appointment with urologist, history of sling operation, as she complains a lot about her urgency will start her on oxybutynin to help her relax her bladder, if it works she can continue taking it, the side effects of dry mouth is discussed with her

## 2023-10-24 NOTE — PROGRESS NOTES
Name: Ramos Yeh      : 1970      MRN: [id number]  Encounter Provider: Paula Yañez MD  Encounter Date: 10/24/2023   Encounter department: [address] [place] [address]     1. Frequency of micturition  -     POCT urine dip    2. Urgency of urination  Assessment & Plan:  Her urine dipstick is normal, she has made appointment with urologist, history of sling operation, as she complains a lot about her urgency will start her on oxybutynin to help her relax her bladder, if it works she can continue taking it, the side effects of dry mouth is discussed with her    Orders:  -     oxybutynin (DITROPAN-XL) 5 mg 24 hr tablet; Take 1 tablet (5 mg total) by mouth daily    3. Prediabetes  Assessment & Plan:  Prediabetic, advised to have low-carb diet as sugar can increase her urination too             Subjective     She complains of frequency of urine and 3 days ago she saw slight blood in the urine, no back pain fever or chills, she said initially there was some burning in urine but that burning is resolved she is drinking plenty of fluids she says she always have problem with the bladder control and she has urge, she has previous sling operation on her bladder and she made appointment with urologist      Review of Systems   Constitutional: Negative. HENT: Negative. Eyes: Negative. Respiratory: Negative. Cardiovascular: Negative. Gastrointestinal: Negative. Genitourinary:  Positive for frequency. Skin: Negative. Psychiatric/Behavioral: Negative. Past Medical History:   Diagnosis Date   • Leukocytosis     last assessed 17     Past Surgical History:   Procedure Laterality Date   • BLADDER SURGERY      bladder sling? approx 7 years   • CYSTOSCOPY      diagnostic,last assessed 10/17/17   • OTHER SURGICAL HISTORY      suprapublic sling     No family history on file.   Social History     Socioeconomic History   • Marital status: Single     Spouse name: Not on file   • Number of children: 4   • Years of education: Not on file   • Highest education level: Not on file   Occupational History   • Not on file   Tobacco Use   • Smoking status: Never   • Smokeless tobacco: Never   Substance and Sexual Activity   • Alcohol use: Yes     Comment: social   • Drug use: No   • Sexual activity: Not on file   Other Topics Concern   • Not on file   Social History Narrative   • Not on file     Social Determinants of Health     Financial Resource Strain: Not on file   Food Insecurity: Not on file   Transportation Needs: Not on file   Physical Activity: Not on file   Stress: Not on file   Social Connections: Not on file   Intimate Partner Violence: Not on file   Housing Stability: Not on file     Current Outpatient Medications on File Prior to Visit   Medication Sig   • albuterol (ProAir HFA) 90 mcg/act inhaler Inhale 2 puffs every 6 (six) hours as needed for shortness of breath (Patient not taking: Reported on 5/19/2023)   • atorvastatin (LIPITOR) 10 mg tablet TAKE 1/2 TABLET BY MOUTH DAILY   • fluticasone (FLONASE) 50 mcg/act nasal spray SPRAY 2 SPRAYS INTO EACH NOSTRIL EVERY DAY   • loratadine (CLARITIN) 10 mg tablet Take 1 tablet (10 mg total) by mouth daily   • NON FORMULARY Apply 30 mL topically daily at bedtime Cleanse & apply 2 pumps to entire face at night or as directed by doctor.  (Patient not taking: Reported on 10/1/2022)   • omeprazole (PriLOSEC) 20 mg delayed release capsule Take 1 capsule (20 mg total) by mouth daily     Allergies   Allergen Reactions   • Pollen Extract      Immunization History   Administered Date(s) Administered   • Influenza Quadrivalent Preservative Free 3 years and older IM 11/02/2016, 11/20/2017   • Influenza, recombinant, quadrivalent,injectable, preservative free 11/27/2018, 11/26/2019, 09/15/2020   • Influenza, seasonal, injectable 10/18/2011, 11/05/2013   • TD (adult) Preservative Free 11/05/2013   • Tdap 11/05/2013       Objective     /70 Pulse 75   Temp 97.8 °F (36.6 °C)   Resp 16   Ht 5' 3" (1.6 m)   Wt 67.1 kg (148 lb)   SpO2 99%   BMI 26.22 kg/m²     Physical Exam  Vitals and nursing note reviewed. Cardiovascular:      Rate and Rhythm: Normal rate. Heart sounds: No murmur heard. Pulmonary:      Effort: Pulmonary effort is normal.   Abdominal:      General: Abdomen is flat. There is no distension. Palpations: There is no mass. Tenderness: There is no abdominal tenderness. There is no right CVA tenderness, left CVA tenderness or guarding. Musculoskeletal:      Cervical back: Normal range of motion.        Kandice Pulido MD

## 2023-10-25 ENCOUNTER — TELEPHONE (OUTPATIENT)
Dept: ADMINISTRATIVE | Facility: OTHER | Age: 53
End: 2023-10-25

## 2023-10-25 NOTE — TELEPHONE ENCOUNTER
10/25/23 2:57 PM    The patient was called and reminded about the open Cologuard order. Instructed to call the ordering provider's office if there are any questions about completing the CRC screen. Thank you.   Patsy Woodson  [facility]

## 2023-11-15 DIAGNOSIS — R39.15 URGENCY OF URINATION: ICD-10-CM

## 2023-11-15 RX ORDER — OXYBUTYNIN CHLORIDE 5 MG/1
5 TABLET, EXTENDED RELEASE ORAL DAILY
Qty: 90 TABLET | Refills: 0 | Status: SHIPPED | OUTPATIENT
Start: 2023-11-15

## 2023-11-24 PROBLEM — Z12.12 SCREENING FOR COLORECTAL CANCER: Status: RESOLVED | Noted: 2018-11-27 | Resolved: 2023-11-24

## 2023-11-24 PROBLEM — Z12.11 SCREENING FOR COLORECTAL CANCER: Status: RESOLVED | Noted: 2018-11-27 | Resolved: 2023-11-24

## 2023-11-27 ENCOUNTER — OFFICE VISIT (OUTPATIENT)
Dept: UROLOGY | Facility: CLINIC | Age: 53
End: 2023-11-27
Payer: COMMERCIAL

## 2023-11-27 VITALS
SYSTOLIC BLOOD PRESSURE: 108 MMHG | WEIGHT: 149 LBS | OXYGEN SATURATION: 98 % | BODY MASS INDEX: 26.4 KG/M2 | HEIGHT: 63 IN | DIASTOLIC BLOOD PRESSURE: 72 MMHG | HEART RATE: 73 BPM

## 2023-11-27 DIAGNOSIS — R35.0 URINARY FREQUENCY: ICD-10-CM

## 2023-11-27 DIAGNOSIS — N39.0 RECURRENT UTI: ICD-10-CM

## 2023-11-27 DIAGNOSIS — R31.0 GROSS HEMATURIA: Primary | ICD-10-CM

## 2023-11-27 LAB
SL AMB  POCT GLUCOSE, UA: NORMAL
SL AMB LEUKOCYTE ESTERASE,UA: NORMAL
SL AMB POCT BILIRUBIN,UA: NORMAL
SL AMB POCT BLOOD,UA: NORMAL
SL AMB POCT CLARITY,UA: CLEAR
SL AMB POCT COLOR,UA: YELLOW
SL AMB POCT KETONES,UA: NORMAL
SL AMB POCT NITRITE,UA: NORMAL
SL AMB POCT PH,UA: 8
SL AMB POCT SPECIFIC GRAVITY,UA: 1
SL AMB POCT URINE PROTEIN: NORMAL
SL AMB POCT UROBILINOGEN: 0.2

## 2023-11-27 PROCEDURE — 87086 URINE CULTURE/COLONY COUNT: CPT | Performed by: PHYSICIAN ASSISTANT

## 2023-11-27 PROCEDURE — 88112 CYTOPATH CELL ENHANCE TECH: CPT | Performed by: PATHOLOGY

## 2023-11-27 PROCEDURE — 87077 CULTURE AEROBIC IDENTIFY: CPT | Performed by: PHYSICIAN ASSISTANT

## 2023-11-27 PROCEDURE — 81002 URINALYSIS NONAUTO W/O SCOPE: CPT | Performed by: PHYSICIAN ASSISTANT

## 2023-11-27 PROCEDURE — 87186 SC STD MICRODIL/AGAR DIL: CPT | Performed by: PHYSICIAN ASSISTANT

## 2023-11-27 PROCEDURE — 87147 CULTURE TYPE IMMUNOLOGIC: CPT | Performed by: PHYSICIAN ASSISTANT

## 2023-11-27 PROCEDURE — 99203 OFFICE O/P NEW LOW 30 MIN: CPT | Performed by: PHYSICIAN ASSISTANT

## 2023-11-27 NOTE — PROGRESS NOTES
1. Gross hematuria  CT renal protocol    Basic metabolic panel    Cytology, urine    Urine culture      2. Recurrent UTI  POCT urine dip    Cytology, urine    Urine culture      3. Urinary frequency  Mirabegron ER 25 MG TB24    Cytology, urine    Urine culture            Assessment and plan:       Urinary frequency and urgency  - significant xerostomia with oxybutynin  - trial of myrbetriq. Use and side effect profile reviewed    2. hematuria  - urine cytology, CT, and cystoscopy for evaluation    3. Hx of urethral sling  - no incontinence      Willis Barnes PA-C      Chief Complaint     Urinary symptoms    History of Present Illness     Clay Toro is a 52 y.o. female presenting today for consultation. Patient reports concerns for recurrent urinary infections. Ultimately only had 1 low colony count positive culture. She has had symptoms despite negative cultures. Symptoms typically include frequency and urgency. Primary care has started her on oxybutynin however noticed significant xerostomia and therefore discontinued. She does report 2 episodes of gross hematuria. History of urethral sling over 10 years ago for stress incontinence. No continued leakage. Medical comorbidities include prediabetes, thyroid nodule, GERD. Urine dip trace leukocytes, nitrite and blood negative. Laboratory     Lab Results   Component Value Date    CREATININE 0.71 09/11/2023       Review of Systems     Review of Systems   Constitutional:  Negative for activity change, appetite change, chills, diaphoresis, fatigue, fever and unexpected weight change. Respiratory:  Negative for chest tightness and shortness of breath. Cardiovascular:  Negative for chest pain, palpitations and leg swelling. Gastrointestinal:  Negative for abdominal distention, abdominal pain, constipation, diarrhea, nausea and vomiting. Genitourinary:  Positive for frequency, hematuria and urgency.  Negative for decreased urine volume, difficulty urinating, dysuria, enuresis, flank pain and genital sores. Musculoskeletal:  Negative for back pain, gait problem and myalgias. Skin:  Negative for color change, pallor, rash and wound. Psychiatric/Behavioral:  Negative for behavioral problems. The patient is not nervous/anxious. Allergies     Allergies   Allergen Reactions    Pollen Extract        Physical Exam     Physical Exam  Exam conducted with a chaperone present (present with spouse). Constitutional:       General: She is not in acute distress. Appearance: Normal appearance. She is normal weight. She is not ill-appearing, toxic-appearing or diaphoretic. HENT:      Head: Normocephalic and atraumatic. Eyes:      General:         Right eye: No discharge. Left eye: No discharge. Conjunctiva/sclera: Conjunctivae normal.   Pulmonary:      Effort: Pulmonary effort is normal. No respiratory distress. Musculoskeletal:         General: No swelling or tenderness. Normal range of motion. Skin:     General: Skin is warm and dry. Coloration: Skin is not jaundiced or pale. Neurological:      General: No focal deficit present. Mental Status: She is alert and oriented to person, place, and time. Psychiatric:         Mood and Affect: Mood normal.         Behavior: Behavior normal.         Thought Content:  Thought content normal.           Vital Signs     Vitals:    11/27/23 0923   BP: 108/72   BP Location: Left arm   Patient Position: Sitting   Cuff Size: Adult   Pulse: 73   SpO2: 98%   Weight: 67.6 kg (149 lb)   Height: 5' 3" (1.6 m)         Current Medications       Current Outpatient Medications:     atorvastatin (LIPITOR) 10 mg tablet, TAKE 1/2 TABLET BY MOUTH DAILY, Disp: 45 tablet, Rfl: 1    fluticasone (FLONASE) 50 mcg/act nasal spray, SPRAY 2 SPRAYS INTO EACH NOSTRIL EVERY DAY, Disp: 48 mL, Rfl: 1    loratadine (CLARITIN) 10 mg tablet, Take 1 tablet (10 mg total) by mouth daily, Disp: 90 tablet, Rfl: 2    Mirabegron ER 25 MG TB24, Take 25 mg by mouth in the morning, Disp: 30 tablet, Rfl: 3    omeprazole (PriLOSEC) 20 mg delayed release capsule, Take 1 capsule (20 mg total) by mouth daily, Disp: 90 capsule, Rfl: 3    albuterol (ProAir HFA) 90 mcg/act inhaler, Inhale 2 puffs every 6 (six) hours as needed for shortness of breath (Patient not taking: Reported on 5/19/2023), Disp: 18 g, Rfl: 1    NON FORMULARY, Apply 30 mL topically daily at bedtime Cleanse & apply 2 pumps to entire face at night or as directed by doctor. (Patient not taking: Reported on 10/1/2022), Disp: , Rfl:       Active Problems     Patient Active Problem List   Diagnosis    Allergic rhinitis    Asthma, mild intermittent    Hyperlipidemia    Need for influenza vaccination    Prediabetes    Frequency of micturition    Gastroesophageal reflux disease without esophagitis    BMI 26.0-26.9,adult    Upper back pain, chronic    Neck mass    Thyroid nodule    Urgency of urination         Past Medical History     Past Medical History:   Diagnosis Date    Leukocytosis     last assessed 7/31/17         Surgical History     Past Surgical History:   Procedure Laterality Date    BLADDER SURGERY      bladder sling? approx 7 years    CYSTOSCOPY      diagnostic,last assessed 10/17/17    OTHER SURGICAL HISTORY      suprapublic sling       Family History     History reviewed. No pertinent family history.       Social History     Social History       Radiology

## 2023-11-29 ENCOUNTER — TELEPHONE (OUTPATIENT)
Dept: UROLOGY | Facility: AMBULATORY SURGERY CENTER | Age: 53
End: 2023-11-29

## 2023-11-29 DIAGNOSIS — N39.0 RECURRENT UTI: Primary | ICD-10-CM

## 2023-11-29 LAB — BACTERIA UR CULT: ABNORMAL

## 2023-11-29 RX ORDER — NITROFURANTOIN 25; 75 MG/1; MG/1
100 CAPSULE ORAL 2 TIMES DAILY
Qty: 10 CAPSULE | Refills: 0 | Status: SHIPPED | OUTPATIENT
Start: 2023-11-29 | End: 2023-12-04

## 2023-11-30 PROCEDURE — 88112 CYTOPATH CELL ENHANCE TECH: CPT | Performed by: PATHOLOGY

## 2024-01-02 LAB
BUN SERPL-MCNC: 14 MG/DL (ref 7–25)
BUN/CREAT SERPL: ABNORMAL (CALC) (ref 6–22)
CALCIUM SERPL-MCNC: 9.6 MG/DL (ref 8.6–10.4)
CHLORIDE SERPL-SCNC: 101 MMOL/L (ref 98–110)
CO2 SERPL-SCNC: 31 MMOL/L (ref 20–32)
CREAT SERPL-MCNC: 0.6 MG/DL (ref 0.5–1.03)
GFR/BSA.PRED SERPLBLD CYS-BASED-ARV: 106 ML/MIN/1.73M2
GLUCOSE SERPL-MCNC: 112 MG/DL (ref 65–99)
POTASSIUM SERPL-SCNC: 4.1 MMOL/L (ref 3.5–5.3)
SODIUM SERPL-SCNC: 140 MMOL/L (ref 135–146)

## 2024-01-08 ENCOUNTER — HOSPITAL ENCOUNTER (OUTPATIENT)
Dept: CT IMAGING | Facility: HOSPITAL | Age: 54
Discharge: HOME | End: 2024-01-08
Payer: COMMERCIAL

## 2024-01-08 DIAGNOSIS — R31.0 GROSS HEMATURIA: ICD-10-CM

## 2024-01-08 PROCEDURE — G1004 CDSM NDSC: HCPCS

## 2024-01-08 PROCEDURE — 74178 CT ABD&PLV WO CNTR FLWD CNTR: CPT

## 2024-01-08 RX ADMIN — IOHEXOL 100 ML: 350 INJECTION, SOLUTION INTRAVENOUS at 07:54

## 2024-01-15 ENCOUNTER — OFFICE VISIT (OUTPATIENT)
Dept: FAMILY MEDICINE CLINIC | Facility: CLINIC | Age: 54
End: 2024-01-15
Payer: COMMERCIAL

## 2024-01-15 VITALS
BODY MASS INDEX: 26.93 KG/M2 | SYSTOLIC BLOOD PRESSURE: 116 MMHG | DIASTOLIC BLOOD PRESSURE: 78 MMHG | HEART RATE: 68 BPM | HEIGHT: 63 IN | WEIGHT: 152 LBS | OXYGEN SATURATION: 99 %

## 2024-01-15 DIAGNOSIS — N89.8 VAGINAL ITCHING: Primary | ICD-10-CM

## 2024-01-15 PROCEDURE — 99213 OFFICE O/P EST LOW 20 MIN: CPT | Performed by: FAMILY MEDICINE

## 2024-01-15 RX ORDER — FLUCONAZOLE 150 MG/1
150 TABLET ORAL ONCE
Qty: 1 TABLET | Refills: 0 | Status: SHIPPED | OUTPATIENT
Start: 2024-01-15 | End: 2024-01-15

## 2024-01-15 NOTE — ASSESSMENT & PLAN NOTE
Vaginal itching with redness and irritation of the area.  Prescribe Diflucan.  Patient will follow-up if symptoms do not improve.  She is scheduled for a Pap with PCP.

## 2024-01-15 NOTE — PROGRESS NOTES
Subjective:      Patient ID: Clay Dixon is a 53 y.o. female.    HPI    Patient is here reporting symptoms of vaginal itching which started 5 days ago.  Patient has been using over-the-counter Vagisil without any improvement in symptoms.  Reports irritation and redness of the labia.  Denies any vaginal discharge/urinary symptoms.  Pap smear result from 2022 discussed with patient.  Patient has to repeat her Pap this year.    Past Medical History:   Diagnosis Date    Leukocytosis     last assessed 7/31/17       History reviewed. No pertinent family history.    Past Surgical History:   Procedure Laterality Date    BLADDER SURGERY      bladder sling? approx 7 years    CYSTOSCOPY      diagnostic,last assessed 10/17/17    OTHER SURGICAL HISTORY      suprapublic sling        reports that she has never smoked. She has never used smokeless tobacco. She reports current alcohol use. She reports that she does not use drugs.      Current Outpatient Medications:     atorvastatin (LIPITOR) 10 mg tablet, TAKE 1/2 TABLET BY MOUTH DAILY, Disp: 45 tablet, Rfl: 1    fluconazole (DIFLUCAN) 150 mg tablet, Take 1 tablet (150 mg total) by mouth once for 1 dose, Disp: 1 tablet, Rfl: 0    fluticasone (FLONASE) 50 mcg/act nasal spray, SPRAY 2 SPRAYS INTO EACH NOSTRIL EVERY DAY, Disp: 48 mL, Rfl: 1    loratadine (CLARITIN) 10 mg tablet, Take 1 tablet (10 mg total) by mouth daily, Disp: 90 tablet, Rfl: 2    Mirabegron ER 25 MG TB24, Take 25 mg by mouth in the morning, Disp: 30 tablet, Rfl: 3    omeprazole (PriLOSEC) 20 mg delayed release capsule, Take 1 capsule (20 mg total) by mouth daily, Disp: 90 capsule, Rfl: 3    albuterol (ProAir HFA) 90 mcg/act inhaler, Inhale 2 puffs every 6 (six) hours as needed for shortness of breath (Patient not taking: Reported on 5/19/2023), Disp: 18 g, Rfl: 1    NON FORMULARY, Apply 30 mL topically daily at bedtime Cleanse & apply 2 pumps to entire face at night or as directed by doctor. (Patient not taking:  "Reported on 10/1/2022), Disp: , Rfl:     The following portions of the patient's history were reviewed and updated as appropriate: allergies, current medications, past family history, past medical history, past social history, past surgical history and problem list.    Review of Systems   Genitourinary:         Vaginal itching           Objective:    /78   Pulse 68   Ht 5' 3\" (1.6 m)   Wt 68.9 kg (152 lb)   SpO2 99%   BMI 26.93 kg/m²      Physical Exam  Constitutional:       Appearance: Normal appearance.   Cardiovascular:      Heart sounds: Normal heart sounds.   Pulmonary:      Breath sounds: Normal breath sounds.           Recent Results (from the past 1008 hour(s))   Basic metabolic panel    Collection Time: 01/02/24  9:12 AM   Result Value Ref Range    Glucose, Random 112 (H) 65 - 99 mg/dL    BUN 14 7 - 25 mg/dL    Creatinine 0.60 0.50 - 1.03 mg/dL    eGFR 106 > OR = 60 mL/min/1.73m2    SL AMB BUN/CREATININE RATIO SEE NOTE: 6 - 22 (calc)    Sodium 140 135 - 146 mmol/L    Potassium 4.1 3.5 - 5.3 mmol/L    Chloride 101 98 - 110 mmol/L    CO2 31 20 - 32 mmol/L    Calcium 9.6 8.6 - 10.4 mg/dL       Assessment/Plan:    No problem-specific Assessment & Plan notes found for this encounter.           Problem List Items Addressed This Visit          Genitourinary    Vaginal itching - Primary     Vaginal itching with redness and irritation of the area.  Prescribe Diflucan.  Patient will follow-up if symptoms do not improve.  She is scheduled for a Pap with PCP.         Relevant Medications    fluconazole (DIFLUCAN) 150 mg tablet       "

## 2024-01-30 ENCOUNTER — NURSE TRIAGE (OUTPATIENT)
Age: 54
End: 2024-01-30

## 2024-01-30 DIAGNOSIS — R39.9 UTI SYMPTOMS: Primary | ICD-10-CM

## 2024-01-30 NOTE — TELEPHONE ENCOUNTER
Pt of [facility] in [place]. Last seen on 11/27/23 gross hematuria    Pt called office. Reports having symptoms since Sunday and now getting worse. Pt reports burning with urination, frequency and some blood in urine. Pt denies fever or chills. Orders placed for urine testing. Pt and spouse advised good hydration, ER precautioons reviewed. States understanding.    Answer Questions - Initial Assessment  When did your symptoms start: Sunday    Is burning with every void: yes    Do you have any other urinary symptoms, urinary frequency, urgency, incontinence: yes    Have you become unable to urinate: No: I do not feel as though I am retaining urine    Have you seen blood in your urine: yes    Do you have any abdominal pain or flank pain: no    Do you have a fever of 101 or higher: no    Do you have a history of urinary tract infections: Yes: @LASTLAB(MG)@    Have you had any recent urine testing: no    Protocols used: Dysuria

## 2024-01-30 NOTE — TELEPHONE ENCOUNTER
Patient's spouse Al called today on behalf of the pt.    Pt is c/o burning and bleeding when urinating.     Pt was offered to receive a returned call, but she is heading to work soon.     Pt was cold transferred to triage queue (there were others waiting in queue)

## 2024-01-31 ENCOUNTER — APPOINTMENT (OUTPATIENT)
Dept: LAB | Facility: CLINIC | Age: 54
End: 2024-01-31
Payer: COMMERCIAL

## 2024-01-31 DIAGNOSIS — R39.9 UTI SYMPTOMS: ICD-10-CM

## 2024-01-31 DIAGNOSIS — R31.0 GROSS HEMATURIA: ICD-10-CM

## 2024-01-31 DIAGNOSIS — R73.03 PREDIABETES: ICD-10-CM

## 2024-01-31 LAB
BACTERIA UR QL AUTO: NORMAL /HPF
BILIRUB UR QL STRIP: NEGATIVE
CLARITY UR: CLEAR
COLOR UR: COLORLESS
GLUCOSE UR STRIP-MCNC: NEGATIVE MG/DL
HGB UR QL STRIP.AUTO: NEGATIVE
KETONES UR STRIP-MCNC: NEGATIVE MG/DL
LEUKOCYTE ESTERASE UR QL STRIP: NEGATIVE
NITRITE UR QL STRIP: NEGATIVE
NON-SQ EPI CELLS URNS QL MICRO: NORMAL /HPF
PH UR STRIP.AUTO: 7 [PH]
PROT UR STRIP-MCNC: NEGATIVE MG/DL
RBC #/AREA URNS AUTO: NORMAL /HPF
SP GR UR STRIP.AUTO: 1 (ref 1–1.03)
UROBILINOGEN UR STRIP-ACNC: <2 MG/DL
WBC #/AREA URNS AUTO: NORMAL /HPF

## 2024-01-31 PROCEDURE — 87086 URINE CULTURE/COLONY COUNT: CPT

## 2024-01-31 PROCEDURE — 81001 URINALYSIS AUTO W/SCOPE: CPT

## 2024-02-01 LAB — BACTERIA UR CULT: NORMAL

## 2024-02-01 NOTE — TELEPHONE ENCOUNTER
Urine testing negative. Increase hydration, avoidance of bladder irritants, avoidance of constipation. Follow up as scheduled next week. Thanks

## 2024-02-02 NOTE — TELEPHONE ENCOUNTER
Called and relayed to patient the urine testing results and the recs from the PA. Advised we will further discuss symptoms at upcoming office visit next week

## 2024-02-07 NOTE — PROGRESS NOTES
Assessment/Plan:    Recurrent UTI  The patient voices issues with recurrent UTIs but we do not have culture data to support this as her last urine culture was negative and the culture before this had a low colony count and was 6 months prior.  I think it is very important that she gets a urine culture drawn each time she think she is having symptoms of infection so we can determine if she is truly having UTIs or symptoms for other reasons.  Regardless I think she may benefit from a use of an estrogen cream in the vagina applied every other day this will likely help treat recurrent urinary UTIs if she is having them.    We had an explicit discussion regarding her prior history of sling if this is contributing to her recurrent UTIs.  As I do not see signs of sling on cystoscopy I think it is unlikely that is playing a role.  The patient asked about sling excision.  We discussed this is a realistic option but will likely result in stress incontinence and in my mind risks outweigh benefits since we do not have any strong reason to think it is contributing to her current symptoms.  If she were more seriously interested in pursuing this I would likely refer her to urogynecology    Gross hematuria  The patient has had a negative hematuria workup.  Recommend annual urinalysis with PCP and consideration for repeat workup in 3-5 years if micro hematuria persists.  If the patient has persistent gross hematuria that is not associated with an infection then they should see us immediately.    Urgency of urination  The patient complains of urgency and has tried Ditropan but did not tolerate this.  We discussed there are other options for OAB symptoms such as Botox and other oral medications but rather than starting multiple treatments I think it makes more sense for her to try the Premarin cream for a few months and then see us back and reassess what her issues are at that point          Subjective:      Patient ID: Clay Dixon  is a 53 y.o. female.    HPI  Clay Dixon is a 53 y.o. female with distant history of urethral sling now with gross hematuria and recurrent urine tract infections..     Patient had concerns for recurrent urinary infections but  ultimately only had 1 low colony count positive culture.  She has had symptoms despite negative cultures.  Symptoms typically include frequency and urgency.  Primary care has started her on oxybutynin however noticed significant xerostomia and therefore discontinued.    She does report 2 episodes of gross hematuria.  This prompted a CT IVP in January 2024 which was unremarkable     She had a urine cytology in November 2023 which was negative.    Cystoscopy today was unremarkable.    Has a prior history of hematuria workup in 2017 with negative cystoscopy, cytology and CT stone protocol at that time.    History of urethral sling over 10 years ago for stress incontinence.  No continued leakage.     Medical comorbidities include prediabetes, thyroid nodule, GERD.     Past Surgical History:   Procedure Laterality Date    BLADDER SURGERY      bladder sling? approx 7 years    CYSTOSCOPY      diagnostic,last assessed 10/17/17    OTHER SURGICAL HISTORY      suprapublic sling        Past Medical History:   Diagnosis Date    Leukocytosis     last assessed 7/31/17             Review of Systems   Constitutional:  Negative for chills and fever.   HENT:  Negative for ear pain and sore throat.    Eyes:  Negative for pain and visual disturbance.   Respiratory:  Negative for cough and shortness of breath.    Cardiovascular:  Negative for chest pain and palpitations.   Gastrointestinal:  Negative for abdominal pain and vomiting.   Genitourinary:  Negative for dysuria and hematuria.   Musculoskeletal:  Negative for arthralgias and back pain.   Skin:  Negative for color change and rash.   Neurological:  Negative for seizures and syncope.   All other systems reviewed and are negative.        Objective:      BP  "108/82 (BP Location: Left arm, Patient Position: Sitting, Cuff Size: Adult)   Pulse 74   Wt 68.9 kg (152 lb)   SpO2 100%   BMI 26.93 kg/m²     No results found for: \"PSA\"       Physical Exam  Vitals reviewed.   Constitutional:       General: She is not in acute distress.     Appearance: Normal appearance. She is not ill-appearing, toxic-appearing or diaphoretic.   HENT:      Head: Normocephalic and atraumatic.   Eyes:      Extraocular Movements: Extraocular movements intact.      Pupils: Pupils are equal, round, and reactive to light.   Pulmonary:      Effort: Pulmonary effort is normal.   Abdominal:      General: Abdomen is flat. There is no distension.      Palpations: Abdomen is soft. There is no mass.      Tenderness: There is no abdominal tenderness. There is no guarding or rebound.      Hernia: No hernia is present.   Skin:     General: Skin is warm.   Neurological:      General: No focal deficit present.      Mental Status: She is alert and oriented to person, place, and time. Mental status is at baseline.   Psychiatric:         Mood and Affect: Mood normal.         Behavior: Behavior normal.         Thought Content: Thought content normal.               Cystoscopy     Date/Time  2/8/2024 8:30 AM     Performed by  Jeffry Lyons MD   Authorized by  Jeffry Lyons MD     Universal Protocol:  Consent: Written consent obtained.  Risks and benefits: risks, benefits and alternatives were discussed  Consent given by: patient  Time out: Immediately prior to procedure a \"time out\" was called to verify the correct patient, procedure, equipment, support staff and site/side marked as required.  Patient understanding: patient states understanding of the procedure being performed  Patient consent: the patient's understanding of the procedure matches consent given  Procedure consent: procedure consent matches procedure scheduled  Patient identity confirmed: verbally with patient      Procedure Details:  Procedure type: " cystoscopy    Patient tolerance: Patient tolerated the procedure well with no immediate complications    Additional Procedure Details: A female MA was in the room and served as a chaperone and assistant    The patient's external genitals were sterilely prepped and draped.  Viscous lidocaine was introduced into the urethra  A flexible cystoscope was introduced into the urethra    Urethra: Normal  Bladder: No lesions. Ureteral orifices in orthotopic position.  No diverticula or trabeculations  I did not see any evidence of the urethral sling present.        I personally reviewed the patient's CT scan images.  I do not see any concerning findings in the kidneys or ureters to explain their hematuria.     FINDINGS:     ABDOMEN     RIGHT KIDNEY AND URETER:  No solid renal mass.  No detectable urothelial mass.  No hydronephrosis or hydroureter.  No urinary tract calculi. No perinephric collection.     LEFT KIDNEY AND URETER:  No solid renal mass.  No detectable urothelial mass.  No hydronephrosis or hydroureter.  No urinary tract calculi. No perinephric collection.     URINARY BLADDER:  No bladder wall mass.  No calculi.        LOWER CHEST:  No clinically significant abnormality identified in the visualized lower chest.     LIVER/BILIARY TREE: Multiple hepatic cysts are demonstrated in the right and left lobes of the liver, the largest in the left lobe.     GALLBLADDER:  No calcified gallstones. No pericholecystic inflammatory change.     SPLEEN:  Unremarkable.     PANCREAS:  Unremarkable.     ADRENAL GLANDS:  Unremarkable.     STOMACH AND BOWEL:  Unremarkable.     APPENDIX: A normal appendix was visualized.     ABDOMINOPELVIC CAVITY:  No ascites.  No free intraperitoneal air. No lymphadenopathy.     VESSELS:  Unremarkable for patient's age.     PELVIS     REPRODUCTIVE ORGANS:  Unremarkable for patient's age.     ABDOMINAL WALL/INGUINAL REGIONS: There is a small fat-containing umbilical hernia.     OSSEOUS STRUCTURES:  No  acute fracture or destructive osseous lesion. Sclerotic bony lesion at L3 unchanged, likely a bone island.     IMPRESSION:        1. Normal examination. No CT abnormality identified to account for hematuria.  2. Multiple hepatic cysts.    Orders  Orders Placed This Encounter   Procedures    Cystoscopy     This order was created via procedure documentation    POCT urine dip

## 2024-02-08 ENCOUNTER — NURSE TRIAGE (OUTPATIENT)
Age: 54
End: 2024-02-08

## 2024-02-08 ENCOUNTER — PROCEDURE VISIT (OUTPATIENT)
Dept: UROLOGY | Facility: CLINIC | Age: 54
End: 2024-02-08
Payer: COMMERCIAL

## 2024-02-08 VITALS
DIASTOLIC BLOOD PRESSURE: 82 MMHG | HEART RATE: 74 BPM | OXYGEN SATURATION: 100 % | SYSTOLIC BLOOD PRESSURE: 108 MMHG | WEIGHT: 152 LBS | BODY MASS INDEX: 26.93 KG/M2

## 2024-02-08 DIAGNOSIS — R39.15 URGENCY OF URINATION: ICD-10-CM

## 2024-02-08 DIAGNOSIS — R31.0 GROSS HEMATURIA: Primary | ICD-10-CM

## 2024-02-08 DIAGNOSIS — N39.0 RECURRENT UTI: ICD-10-CM

## 2024-02-08 LAB
SL AMB  POCT GLUCOSE, UA: NORMAL
SL AMB LEUKOCYTE ESTERASE,UA: NORMAL
SL AMB POCT BILIRUBIN,UA: NORMAL
SL AMB POCT BLOOD,UA: NORMAL
SL AMB POCT CLARITY,UA: CLEAR
SL AMB POCT COLOR,UA: NORMAL
SL AMB POCT KETONES,UA: NORMAL
SL AMB POCT NITRITE,UA: NORMAL
SL AMB POCT PH,UA: 7.5
SL AMB POCT SPECIFIC GRAVITY,UA: 1.01
SL AMB POCT URINE PROTEIN: NORMAL
SL AMB POCT UROBILINOGEN: 0.2

## 2024-02-08 PROCEDURE — 81002 URINALYSIS NONAUTO W/O SCOPE: CPT | Performed by: UROLOGY

## 2024-02-08 PROCEDURE — 99214 OFFICE O/P EST MOD 30 MIN: CPT | Performed by: UROLOGY

## 2024-02-08 PROCEDURE — 52000 CYSTOURETHROSCOPY: CPT | Performed by: UROLOGY

## 2024-02-08 RX ORDER — CONJUGATED ESTROGENS 0.62 MG/G
1 CREAM VAGINAL EVERY OTHER DAY
Qty: 30 G | Refills: 1 | Status: SHIPPED | OUTPATIENT
Start: 2024-02-08 | End: 2024-02-15

## 2024-02-08 NOTE — TELEPHONE ENCOUNTER
"Answer Assessment - Initial Assessment Questions  1. REASON FOR CALL or QUESTION: \"What is your reason for calling today?\" or \"How can I best help you?\" or \"What question do you have that I can help answer?\"      Pharmacy called to verify script that was sent over. No further assistance needed.    Protocols used: Information Only Call - No Triage-ADULT-[place]    "

## 2024-02-08 NOTE — ASSESSMENT & PLAN NOTE
The patient voices issues with recurrent UTIs but we do not have culture data to support this as her last urine culture was negative and the culture before this had a low colony count and was 6 months prior.  I think it is very important that she gets a urine culture drawn each time she think she is having symptoms of infection so we can determine if she is truly having UTIs or symptoms for other reasons.  Regardless I think she may benefit from a use of an estrogen cream in the vagina applied every other day this will likely help treat recurrent urinary UTIs if she is having them.    We had an explicit discussion regarding her prior history of sling if this is contributing to her recurrent UTIs.  As I do not see signs of sling on cystoscopy I think it is unlikely that is playing a role.  The patient asked about sling excision.  We discussed this is a realistic option but will likely result in stress incontinence and in my mind risks outweigh benefits since we do not have any strong reason to think it is contributing to her current symptoms.  If she were more seriously interested in pursuing this I would likely refer her to urogynecology

## 2024-02-08 NOTE — ASSESSMENT & PLAN NOTE
The patient complains of urgency and has tried Ditropan but did not tolerate this.  We discussed there are other options for OAB symptoms such as Botox and other oral medications but rather than starting multiple treatments I think it makes more sense for her to try the Premarin cream for a few months and then see us back and reassess what her issues are at that point

## 2024-02-08 NOTE — ASSESSMENT & PLAN NOTE
The patient has had a negative hematuria workup.  Recommend annual urinalysis with PCP and consideration for repeat workup in 3-5 years if micro hematuria persists.  If the patient has persistent gross hematuria that is not associated with an infection then they should see us immediately.

## 2024-02-12 ENCOUNTER — TELEPHONE (OUTPATIENT)
Age: 54
End: 2024-02-12

## 2024-02-12 NOTE — TELEPHONE ENCOUNTER
Patient was calling see what other option there might be for the estrogens, conjugated (Premarin) vaginal cream she was prescribed. The medication cost over $400. She can not afford this at this time. Is there something she can try that would be less expensive    Cam   [phone]

## 2024-02-13 NOTE — TELEPHONE ENCOUNTER
PA for Premarin not required     Reason (screenshot if applicable)    Prior Authorization not required for patient/medication          Message sent to provider pool yes

## 2024-02-15 DIAGNOSIS — N39.0 RECURRENT UTI: Primary | ICD-10-CM

## 2024-02-15 RX ORDER — ESTRADIOL 0.1 MG/G
CREAM VAGINAL
Qty: 42.5 G | Refills: 1 | Status: SHIPPED | OUTPATIENT
Start: 2024-02-15

## 2024-02-15 NOTE — TELEPHONE ENCOUNTER
Spoke with Clay Dixon and as per pt used good rx coupon but still expensive. Wants the alternative sent (Estrace) and will call back if that is also too expensive.

## 2024-02-21 PROBLEM — N39.0 RECURRENT UTI: Status: RESOLVED | Noted: 2022-06-02 | Resolved: 2024-02-21

## 2024-03-19 LAB
ALBUMIN SERPL-MCNC: 4.5 G/DL (ref 3.6–5.1)
ALBUMIN/GLOB SERPL: 1.6 (CALC) (ref 1–2.5)
ALP SERPL-CCNC: 51 U/L (ref 37–153)
ALT SERPL-CCNC: 25 U/L (ref 6–29)
AST SERPL-CCNC: 23 U/L (ref 10–35)
BASOPHILS # BLD AUTO: 41 CELLS/UL (ref 0–200)
BASOPHILS NFR BLD AUTO: 0.8 %
BILIRUB SERPL-MCNC: 0.6 MG/DL (ref 0.2–1.2)
BUN SERPL-MCNC: 15 MG/DL (ref 7–25)
BUN/CREAT SERPL: ABNORMAL (CALC) (ref 6–22)
CALCIUM SERPL-MCNC: 9.4 MG/DL (ref 8.6–10.4)
CHLORIDE SERPL-SCNC: 100 MMOL/L (ref 98–110)
CHOLEST SERPL-MCNC: 169 MG/DL
CHOLEST/HDLC SERPL: 3.4 (CALC)
CO2 SERPL-SCNC: 31 MMOL/L (ref 20–32)
CREAT SERPL-MCNC: 0.62 MG/DL (ref 0.5–1.03)
EOSINOPHIL # BLD AUTO: 92 CELLS/UL (ref 15–500)
EOSINOPHIL NFR BLD AUTO: 1.8 %
ERYTHROCYTE [DISTWIDTH] IN BLOOD BY AUTOMATED COUNT: 12.1 % (ref 11–15)
GFR/BSA.PRED SERPLBLD CYS-BASED-ARV: 105 ML/MIN/1.73M2
GLOBULIN SER CALC-MCNC: 2.9 G/DL (CALC) (ref 1.9–3.7)
GLUCOSE SERPL-MCNC: 100 MG/DL (ref 65–99)
HBA1C MFR BLD: 5.8 % OF TOTAL HGB
HCT VFR BLD AUTO: 44.4 % (ref 35–45)
HDLC SERPL-MCNC: 50 MG/DL
HGB BLD-MCNC: 14.5 G/DL (ref 11.7–15.5)
LDLC SERPL CALC-MCNC: 98 MG/DL (CALC)
LYMPHOCYTES # BLD AUTO: 2035 CELLS/UL (ref 850–3900)
LYMPHOCYTES NFR BLD AUTO: 39.9 %
MCH RBC QN AUTO: 29.7 PG (ref 27–33)
MCHC RBC AUTO-ENTMCNC: 32.7 G/DL (ref 32–36)
MCV RBC AUTO: 90.8 FL (ref 80–100)
MONOCYTES # BLD AUTO: 321 CELLS/UL (ref 200–950)
MONOCYTES NFR BLD AUTO: 6.3 %
NEUTROPHILS # BLD AUTO: 2611 CELLS/UL (ref 1500–7800)
NEUTROPHILS NFR BLD AUTO: 51.2 %
NONHDLC SERPL-MCNC: 119 MG/DL (CALC)
PLATELET # BLD AUTO: 195 THOUSAND/UL (ref 140–400)
PMV BLD REES-ECKER: 12 FL (ref 7.5–12.5)
POTASSIUM SERPL-SCNC: 3.7 MMOL/L (ref 3.5–5.3)
PROT SERPL-MCNC: 7.4 G/DL (ref 6.1–8.1)
RBC # BLD AUTO: 4.89 MILLION/UL (ref 3.8–5.1)
SODIUM SERPL-SCNC: 139 MMOL/L (ref 135–146)
TRIGL SERPL-MCNC: 113 MG/DL
WBC # BLD AUTO: 5.1 THOUSAND/UL (ref 3.8–10.8)

## 2024-04-01 ENCOUNTER — OFFICE VISIT (OUTPATIENT)
Dept: FAMILY MEDICINE CLINIC | Facility: CLINIC | Age: 54
End: 2024-04-01
Payer: COMMERCIAL

## 2024-04-01 VITALS
RESPIRATION RATE: 16 BRPM | HEART RATE: 71 BPM | SYSTOLIC BLOOD PRESSURE: 120 MMHG | WEIGHT: 152 LBS | OXYGEN SATURATION: 97 % | HEIGHT: 63 IN | DIASTOLIC BLOOD PRESSURE: 70 MMHG | BODY MASS INDEX: 26.93 KG/M2

## 2024-04-01 DIAGNOSIS — Z12.11 SCREEN FOR COLON CANCER: ICD-10-CM

## 2024-04-01 DIAGNOSIS — E78.2 MIXED HYPERLIPIDEMIA: ICD-10-CM

## 2024-04-01 DIAGNOSIS — J30.1 SEASONAL ALLERGIC RHINITIS DUE TO POLLEN: ICD-10-CM

## 2024-04-01 DIAGNOSIS — K21.9 GASTROESOPHAGEAL REFLUX DISEASE WITHOUT ESOPHAGITIS: ICD-10-CM

## 2024-04-01 DIAGNOSIS — R73.03 PREDIABETES: Primary | ICD-10-CM

## 2024-04-01 PROBLEM — R31.0 GROSS HEMATURIA: Status: RESOLVED | Noted: 2024-02-08 | Resolved: 2024-04-01

## 2024-04-01 PROCEDURE — 99214 OFFICE O/P EST MOD 30 MIN: CPT | Performed by: FAMILY MEDICINE

## 2024-04-01 RX ORDER — LORATADINE 10 MG/1
10 TABLET ORAL DAILY
Qty: 90 TABLET | Refills: 2 | Status: SHIPPED | OUTPATIENT
Start: 2024-04-01

## 2024-04-01 RX ORDER — ATORVASTATIN CALCIUM 10 MG/1
TABLET, FILM COATED ORAL
Qty: 45 TABLET | Refills: 1 | Status: SHIPPED | OUTPATIENT
Start: 2024-04-01

## 2024-04-01 NOTE — PROGRESS NOTES
Name: Clay Dixon      : 1970      MRN: [id number]  Encounter Provider: Ana Cristina Garcia MD  Encounter Date: 2024   Encounter department: [facility]    Assessment & Plan     1. Prediabetes  Assessment & Plan:  Her weight is same, blood sugar improved, recheck in 6 months    Orders:  -     CBC and differential; Future; Expected date: 10/01/2024  -     Comprehensive metabolic panel; Future; Expected date: 10/01/2024  -     TSH, 3rd generation; Future; Expected date: 10/01/2024  -     Hemoglobin A1C; Future; Expected date: 10/01/2024  -     Lipid panel; Future; Expected date: 10/01/2024    2. Seasonal allergic rhinitis due to pollen  Assessment & Plan:  Claritin prescription is sent, she use the Claritin and the Flonase when allergies are worse, then she gets cough advised to use Robitussin DM for cough as needed    Orders:  -     loratadine (CLARITIN) 10 mg tablet; Take 1 tablet (10 mg total) by mouth daily    3. Mixed hyperlipidemia  Assessment & Plan:  She only takes half tablet of Lipitor and cholesterol is in good range, repeat labs in 6 months    Orders:  -     atorvastatin (LIPITOR) 10 mg tablet; TAKE 1/2 TABLET BY MOUTH DAILY  -     CBC and differential; Future; Expected date: 10/01/2024  -     Comprehensive metabolic panel; Future; Expected date: 10/01/2024  -     TSH, 3rd generation; Future; Expected date: 10/01/2024  -     Lipid panel; Future; Expected date: 10/01/2024    4. Gastroesophageal reflux disease without esophagitis  Assessment & Plan:  Stable with omeprazole, advised to see the gastroenterologist for an endoscopy and colonoscopy    Orders:  -     Ambulatory Referral to Gastroenterology; Future    5. Screen for colon cancer  -     Ambulatory Referral to Gastroenterology; Future       Advised to come back for Pap smear, last Pap  and was advised to repeated as there were inadequate cells    Subjective     She is here to follow-up on labs and she take omeprazole  most of the time and she wants to get further checkup from gastroenterologist as when she does not take medicine she feels acid reflux.  She takes half tablet of Lipitor and her cholesterol has been fine, she tries to eat more vegetable her weight has been maintained,  She is due for colonoscopy,  She says she was given vaginal cream from the urologist she does not take the mirabegron.  She has seasonal allergy and she needs a refill on Claritin she is using the Flonase, denies any headache chest pain shortness of breath, she complains of burning in vagina,       Review of Systems   Constitutional:  Negative for activity change, appetite change, chills, fatigue, fever and unexpected weight change.   HENT:  Negative for congestion, ear discharge, ear pain, nosebleeds, postnasal drip, rhinorrhea, sinus pressure, sneezing, sore throat, trouble swallowing and voice change.    Eyes:  Negative for photophobia, pain, discharge, redness and itching.   Respiratory:  Negative for cough, chest tightness, shortness of breath and wheezing.    Cardiovascular:  Negative for chest pain, palpitations and leg swelling.   Gastrointestinal:  Negative for abdominal pain, constipation, diarrhea, nausea and vomiting.   Endocrine: Negative for polyuria.   Genitourinary:  Negative for dysuria, frequency and urgency.   Musculoskeletal:  Negative for arthralgias, back pain, myalgias and neck pain.   Skin:  Negative for color change, pallor and rash.   Allergic/Immunologic: Negative for environmental allergies and food allergies.   Neurological:  Negative for dizziness, weakness, light-headedness and headaches.   Hematological:  Negative for adenopathy. Does not bruise/bleed easily.   Psychiatric/Behavioral:  Positive for sleep disturbance. Negative for behavioral problems. The patient is not nervous/anxious.        Past Medical History:   Diagnosis Date   • Leukocytosis     last assessed 7/31/17     Past Surgical History:   Procedure  Laterality Date   • BLADDER SURGERY      bladder sling? approx 7 years   • CYSTOSCOPY      diagnostic,last assessed 10/17/17   • OTHER SURGICAL HISTORY      suprapublic sling     No family history on file.  Social History     Socioeconomic History   • Marital status: Single     Spouse name: Not on file   • Number of children: 4   • Years of education: Not on file   • Highest education level: Not on file   Occupational History   • Not on file   Tobacco Use   • Smoking status: Never   • Smokeless tobacco: Never   Substance and Sexual Activity   • Alcohol use: Yes     Comment: social   • Drug use: No   • Sexual activity: Yes     Partners: Male   Other Topics Concern   • Not on file   Social History Narrative   • Not on file     Social Determinants of Health     Financial Resource Strain: Not on file   Food Insecurity: Not on file   Transportation Needs: Not on file   Physical Activity: Not on file   Stress: Not on file   Social Connections: Not on file   Intimate Partner Violence: Not on file   Housing Stability: Not on file     Current Outpatient Medications on File Prior to Visit   Medication Sig   • albuterol (ProAir HFA) 90 mcg/act inhaler Inhale 2 puffs every 6 (six) hours as needed for shortness of breath (Patient not taking: Reported on 5/19/2023)   • estradiol (ESTRACE VAGINAL) 0.1 mg/g vaginal cream Apply pea sized amount around urethra and inside vaginal opening 3 times weekly   • fluticasone (FLONASE) 50 mcg/act nasal spray SPRAY 2 SPRAYS INTO EACH NOSTRIL EVERY DAY   • Mirabegron ER 25 MG TB24 Take 25 mg by mouth in the morning   • NON FORMULARY Apply 30 mL topically daily at bedtime Cleanse & apply 2 pumps to entire face at night or as directed by doctor. (Patient not taking: Reported on 10/1/2022)   • omeprazole (PriLOSEC) 20 mg delayed release capsule Take 1 capsule (20 mg total) by mouth daily   • [DISCONTINUED] atorvastatin (LIPITOR) 10 mg tablet TAKE 1/2 TABLET BY MOUTH DAILY   • [DISCONTINUED]  "loratadine (CLARITIN) 10 mg tablet Take 1 tablet (10 mg total) by mouth daily     Allergies   Allergen Reactions   • Pollen Extract      Immunization History   Administered Date(s) Administered   • Influenza Quadrivalent Preservative Free 3 years and older IM 11/02/2016, 11/20/2017   • Influenza, recombinant, quadrivalent,injectable, preservative free 11/27/2018, 11/26/2019, 09/15/2020   • Influenza, seasonal, injectable 10/18/2011, 11/05/2013   • TD (adult) Preservative Free 11/05/2013   • Tdap 11/05/2013       Objective     /70   Pulse 71   Resp 16   Ht 5' 3\" (1.6 m)   Wt 68.9 kg (152 lb)   SpO2 97%   BMI 26.93 kg/m²     Physical Exam  Vitals and nursing note reviewed.   Constitutional:       Appearance: Normal appearance. She is well-developed. She is not ill-appearing.   Eyes:      Extraocular Movements: Extraocular movements intact.   Neck:      Thyroid: No thyromegaly.   Cardiovascular:      Rate and Rhythm: Normal rate and regular rhythm.      Heart sounds: Normal heart sounds. No murmur heard.  Pulmonary:      Effort: Pulmonary effort is normal.      Breath sounds: Normal breath sounds. No wheezing or rales.   Musculoskeletal:      Cervical back: Normal range of motion and neck supple.      Right lower leg: No edema.      Left lower leg: No edema.   Lymphadenopathy:      Cervical: No cervical adenopathy.   Skin:     Findings: No erythema or rash.   Neurological:      General: No focal deficit present.      Mental Status: She is alert.   Psychiatric:         Mood and Affect: Mood normal.       Ana Cristina Garcia MD    "

## 2024-04-01 NOTE — ASSESSMENT & PLAN NOTE
Claritin prescription is sent, she use the Claritin and the Flonase when allergies are worse, then she gets cough advised to use Robitussin DM for cough as needed

## 2024-05-01 PROBLEM — Z12.11 SCREEN FOR COLON CANCER: Status: RESOLVED | Noted: 2018-11-27 | Resolved: 2024-05-01

## 2024-05-14 ENCOUNTER — OFFICE VISIT (OUTPATIENT)
Dept: GASTROENTEROLOGY | Facility: AMBULARY SURGERY CENTER | Age: 54
End: 2024-05-14
Payer: COMMERCIAL

## 2024-05-14 VITALS
OXYGEN SATURATION: 99 % | DIASTOLIC BLOOD PRESSURE: 68 MMHG | HEIGHT: 63 IN | WEIGHT: 151.6 LBS | BODY MASS INDEX: 26.86 KG/M2 | SYSTOLIC BLOOD PRESSURE: 118 MMHG | HEART RATE: 71 BPM

## 2024-05-14 DIAGNOSIS — Z12.11 SCREENING FOR COLON CANCER: ICD-10-CM

## 2024-05-14 DIAGNOSIS — K21.9 CHRONIC GERD: Primary | ICD-10-CM

## 2024-05-14 DIAGNOSIS — K76.89 HEPATIC CYST: ICD-10-CM

## 2024-05-14 PROCEDURE — 99204 OFFICE O/P NEW MOD 45 MIN: CPT | Performed by: PHYSICIAN ASSISTANT

## 2024-05-14 NOTE — PATIENT INSTRUCTIONS
Scheduled date of EGD/colonoscopy (as of today): August 9, 2024  Physician performing EGD/colonoscopy: Dr. Tate  Location of EGD/colonoscopy: [facility] bowel prep reviewed with patient:miralax/dulcolax  Instructions reviewed with patient by: Noni GARCIA  Clearances: n/a      I recommend taking famotidine (Pepcid) 20 mg 1-2 times a day as needed for heartburn.

## 2024-05-14 NOTE — PROGRESS NOTES
[facility] - Outpatient Consultation  Clay Dixon 53 y.o. female MRN: [id number]  Encounter: [id number]          ASSESSMENT AND PLAN:      Pleasant 53-year-old female who presents the office as a new patient for GERD and colon cancer screening.    Chronic GERD  Reports symptoms for several years.  She was taking omeprazole throughout this time however it was causing GI upset.  She since stopped this.  Now having reflux symptoms 2 times a week.  No prior EGD.    -Schedule EGD in setting of chronic GERD symptoms to rule out underlying Man's esophagus.    - Patient would like to avoid PPI medications at this time.  Recommend Pepcid 20 mg 1-2 times a day as needed instead.  - Patient had side effects with omeprazole.  If PPI is needed based on EGD recommend alternative.  - Continue to avoid NSAIDs, smoking alcohol use.  - Continue to avoid any known food triggers.    2.  Colon cancer screening  No prior colonoscopy.  Cologuard testing in 2019 was negative.  No family history.  Recent hemoglobin normal.    -Schedule colonoscopy.  - MiraLAX/Dulcolax prep    -I obtained informed consent from the patient. The risks/benefits/alternatives of the procedure were discussed with the patient. Risks included, but not limited to, infection, bleeding, perforation, injury to organs in the abdomen, missed lesion and incomplete procedure were discussed. Patient was agreeable and electronic signature was obtained.    3. Hepatic cysts  Noted on recent CT renal protocol.  Normal liver enzymes.    -Will obtain right upper quadrant ultrasound.  If simple cysts are noted without concerning features, likely no further monitoring needed.      Patient was recommended to follow up after procedure as needed.  Patient was recommended to reach out via [facility] with any questions or concerns in the meantime.   ______________________________________________________________________    HPI:      Clay Dixon is a 53  y.o. female who presents the office as a new patient for GERD, colon cancer screening.  Patient presents with her , Al.    Patient reports longstanding history of reflux symptoms for a few years.  She was taking omeprazole throughout this time however this was causing a lot of upper abdominal discomfort, nausea and gas.  Therefore she recently stopped taking this.  Though symptoms have resolved however she does have reflux symptoms around 2 times a week.  She is using Tums.  She denies any difficulty swallowing, nausea, vomiting, early satiety or unintentional weight loss.  She reports bowel movements are relatively regular.  She will sometimes get constipated and have small streak of blood however this is rare.    Denies smoking and alcohol use.  Rare NSAID use for headaches.    History of bladder sling.    No family history of GI malignancies.    No prior EGD or colonoscopy.  She had Cologuard testing in 2019 which was negative.    Most recent lab work around 2 months ago with normal hemoglobin.  Liver enzymes normal.  She did have a recent CT renal study which showed multiple hepatic cysts.    REVIEW OF SYSTEMS:    CONSTITUTIONAL: Denies any fever, chills, rigors, and weight loss.  HEENT: No earache or tinnitus. Denies hearing loss or visual disturbances.  CARDIOVASCULAR: No chest pain or palpitations.   RESPIRATORY: Denies any cough, hemoptysis, shortness of breath or dyspnea on exertion.  GASTROINTESTINAL: As noted in the History of Present Illness.   GENITOURINARY: No problems with urination. Denies any hematuria or dysuria.  NEUROLOGIC: No dizziness or vertigo, denies headaches.   MUSCULOSKELETAL: Denies any muscle or joint pain.   SKIN: Denies skin rashes or itching.   ENDOCRINE: Denies excessive thirst. Denies intolerance to heat or cold.  PSYCHOSOCIAL: Denies depression or anxiety. Denies any recent memory loss.       Historical Information   Past Medical History:   Diagnosis Date    Leukocytosis   "   last assessed 7/31/17     Past Surgical History:   Procedure Laterality Date    BLADDER SURGERY      bladder sling? approx 7 years    CYSTOSCOPY      diagnostic,last assessed 10/17/17    OTHER SURGICAL HISTORY      suprapublic sling     Social History   Social History     Substance and Sexual Activity   Alcohol Use Yes    Comment: social     Social History     Substance and Sexual Activity   Drug Use No     Social History     Tobacco Use   Smoking Status Never   Smokeless Tobacco Never     History reviewed. No pertinent family history.    Meds/Allergies       Current Outpatient Medications:     atorvastatin (LIPITOR) 10 mg tablet    estradiol (ESTRACE VAGINAL) 0.1 mg/g vaginal cream    fluticasone (FLONASE) 50 mcg/act nasal spray    loratadine (CLARITIN) 10 mg tablet    albuterol (ProAir HFA) 90 mcg/act inhaler    Mirabegron ER 25 MG TB24    NON FORMULARY    Allergies   Allergen Reactions    Pollen Extract            Objective     Blood pressure 118/68, pulse 71, height 5' 3\" (1.6 m), weight 68.8 kg (151 lb 9.6 oz), SpO2 99%. Body mass index is 26.85 kg/m².        PHYSICAL EXAM:      General Appearance:   Alert, cooperative, no distress   HEENT:   Normocephalic, atraumatic, anicteric.     Neck:  Supple, symmetrical, trachea midline   Lungs:   Clear to auscultation bilaterally; no rales, rhonchi or wheezing; respirations unlabored    Heart::   Regular rate and rhythm; no murmur, rub, or gallop.   Abdomen:   Soft, non-tender, non-distended; normal bowel sounds; no masses, no organomegaly. Benign abdomen.    Genitalia:   Deferred    Rectal:   Deferred    Extremities:  No cyanosis, clubbing or edema    Pulses:  2+ and symmetric    Skin:  No jaundice, rashes, or lesions    Lymph nodes:  No palpable cervical lymphadenopathy        Lab Results:   No visits with results within 1 Day(s) from this visit.   Latest known visit with results is:   Orders Only on 03/18/2024   Component Date Value    Total Cholesterol 03/18/2024 " 169     HDL 03/18/2024 50     Triglycerides 03/18/2024 113     LDL Calculated 03/18/2024 98     Chol HDLC Ratio 03/18/2024 3.4     Non-HDL Cholesterol 03/18/2024 119     Glucose, Random 03/18/2024 100 (H)     BUN 03/18/2024 15     Creatinine 03/18/2024 0.62     eGFR 03/18/2024 105     SL AMB BUN/CREATININE RA* 03/18/2024 SEE NOTE:     Sodium 03/18/2024 139     Potassium 03/18/2024 3.7     Chloride 03/18/2024 100     CO2 03/18/2024 31     Calcium 03/18/2024 9.4     Protein, Total 03/18/2024 7.4     Albumin 03/18/2024 4.5     Globulin 03/18/2024 2.9     Albumin/Globulin Ratio 03/18/2024 1.6     TOTAL BILIRUBIN 03/18/2024 0.6     Alkaline Phosphatase 03/18/2024 51     AST 03/18/2024 23     ALT 03/18/2024 25     White Blood Cell Count 03/18/2024 5.1     Red Blood Cell Count 03/18/2024 4.89     Hemoglobin 03/18/2024 14.5     HCT 03/18/2024 44.4     MCV 03/18/2024 90.8     MCH 03/18/2024 29.7     MCHC 03/18/2024 32.7     RDW 03/18/2024 12.1     Platelet Count 03/18/2024 195     SL AMB MPV 03/18/2024 12.0     Neutrophils (Absolute) 03/18/2024 2,611     Lymphocytes (Absolute) 03/18/2024 2,035     Monocytes (Absolute) 03/18/2024 321     Eosinophils (Absolute) 03/18/2024 92     Basophils ABS 03/18/2024 41     Neutrophils 03/18/2024 51.2     Lymphocytes 03/18/2024 39.9     Monocytes 03/18/2024 6.3     Eosinophils 03/18/2024 1.8     Basophils PCT 03/18/2024 0.8     Hemoglobin A1C 03/18/2024 5.8 (H)          Radiology Results:   No results found.

## 2024-05-22 ENCOUNTER — OFFICE VISIT (OUTPATIENT)
Dept: FAMILY MEDICINE CLINIC | Facility: CLINIC | Age: 54
End: 2024-05-22
Payer: COMMERCIAL

## 2024-05-22 VITALS
SYSTOLIC BLOOD PRESSURE: 120 MMHG | HEIGHT: 63 IN | RESPIRATION RATE: 16 BRPM | HEART RATE: 71 BPM | OXYGEN SATURATION: 98 % | BODY MASS INDEX: 26.75 KG/M2 | WEIGHT: 151 LBS | DIASTOLIC BLOOD PRESSURE: 70 MMHG

## 2024-05-22 DIAGNOSIS — Z12.31 ENCOUNTER FOR SCREENING MAMMOGRAM FOR BREAST CANCER: Primary | ICD-10-CM

## 2024-05-22 DIAGNOSIS — R21 SKIN RASH: ICD-10-CM

## 2024-05-22 DIAGNOSIS — Z01.419 GYNECOLOGIC EXAM NORMAL: ICD-10-CM

## 2024-05-22 PROCEDURE — 99396 PREV VISIT EST AGE 40-64: CPT | Performed by: FAMILY MEDICINE

## 2024-05-22 NOTE — ASSESSMENT & PLAN NOTE
Tiny spots on her skin on legs arms abdomen, advised to see dermatologist if that does not improve with taking Claritin, she says she will watch if not better then she will see the dermatologist

## 2024-05-22 NOTE — ASSESSMENT & PLAN NOTE
She has normal pelvic exam and Pap smear is done, her vagina is moist, no abnormality noted,  Breast exam is normal advised to get the mammogram and discussed about DEXA scan she refused and advised to have the tetanus vaccine she does not want

## 2024-05-22 NOTE — PROGRESS NOTES
Ambulatory Visit  Name: Clay Dixon      : 1970      MRN: [id number]  Encounter Provider: Ana Cristina Garcia MD  Encounter Date: 2024   Encounter department: [facility]    Assessment & Plan   1. Encounter for screening mammogram for breast cancer  -     Mammo screening bilateral w 3d & cad; Future  2. Gynecologic exam normal  Assessment & Plan:  She has normal pelvic exam and Pap smear is done, her vagina is moist, no abnormality noted,  Breast exam is normal advised to get the mammogram and discussed about DEXA scan she refused and advised to have the tetanus vaccine she does not want  3. Skin rash  Assessment & Plan:  Tiny spots on her skin on legs arms abdomen, advised to see dermatologist if that does not improve with taking Claritin, she says she will watch if not better then she will see the dermatologist         History of Present Illness     HPI she is here for Pap smear, she did not go for the mammogram and she is not really interested in getting mammograms.  Her last menstruation was 8 years ago, she had Pap smear in  but cells were insufficient and needs to be repeated  Use the vaginal cream for the vaginal itching and she feels more lubrication  Review of Systems   Constitutional: Negative.    HENT: Negative.     Eyes: Negative.    Respiratory: Negative.     Cardiovascular: Negative.    Gastrointestinal: Negative.    Musculoskeletal: Negative.    Skin:         She has small red spots on her skin for few weeks she take allergy medication and are slightly itchy   Neurological: Negative.    Hematological: Negative.    Psychiatric/Behavioral: Negative.       Past Medical History:   Diagnosis Date   • Leukocytosis     last assessed 17     Past Surgical History:   Procedure Laterality Date   • BLADDER SURGERY      bladder sling? approx 7 years   • CYSTOSCOPY      diagnostic,last assessed 10/17/17   • OTHER SURGICAL HISTORY      suprapublic sling     History reviewed.  "No pertinent family history.  Social History     Tobacco Use   • Smoking status: Never   • Smokeless tobacco: Never   Substance and Sexual Activity   • Alcohol use: Yes     Comment: social   • Drug use: No   • Sexual activity: Yes     Partners: Male     Current Outpatient Medications on File Prior to Visit   Medication Sig   • albuterol (ProAir HFA) 90 mcg/act inhaler Inhale 2 puffs every 6 (six) hours as needed for shortness of breath (Patient not taking: Reported on 5/19/2023)   • atorvastatin (LIPITOR) 10 mg tablet TAKE 1/2 TABLET BY MOUTH DAILY   • estradiol (ESTRACE VAGINAL) 0.1 mg/g vaginal cream Apply pea sized amount around urethra and inside vaginal opening 3 times weekly   • fluticasone (FLONASE) 50 mcg/act nasal spray SPRAY 2 SPRAYS INTO EACH NOSTRIL EVERY DAY   • loratadine (CLARITIN) 10 mg tablet Take 1 tablet (10 mg total) by mouth daily   • Mirabegron ER 25 MG TB24 Take 25 mg by mouth in the morning   • NON FORMULARY Apply 30 mL topically daily at bedtime Cleanse & apply 2 pumps to entire face at night or as directed by doctor. (Patient not taking: Reported on 10/1/2022)     Allergies   Allergen Reactions   • Pollen Extract      Immunization History   Administered Date(s) Administered   • Influenza Quadrivalent Preservative Free 3 years and older IM 11/02/2016, 11/20/2017   • Influenza, recombinant, quadrivalent,injectable, preservative free 11/27/2018, 11/26/2019, 09/15/2020   • Influenza, seasonal, injectable 10/18/2011, 11/05/2013   • TD (adult) Preservative Free 11/05/2013   • Tdap 11/05/2013     Objective     /70   Pulse 71   Resp 16   Ht 5' 3\" (1.6 m)   Wt 68.5 kg (151 lb)   SpO2 98%   BMI 26.75 kg/m²     Physical Exam  Vitals and nursing note reviewed.   Constitutional:       General: She is not in acute distress.     Appearance: Normal appearance. She is not ill-appearing.   HENT:      Mouth/Throat:      Mouth: Mucous membranes are moist.      Pharynx: Oropharynx is clear. No " oropharyngeal exudate or posterior oropharyngeal erythema.   Eyes:      General: No scleral icterus.        Right eye: No discharge.         Left eye: No discharge.      Extraocular Movements: Extraocular movements intact.      Conjunctiva/sclera: Conjunctivae normal.      Pupils: Pupils are equal, round, and reactive to light.   Neck:      Vascular: No carotid bruit.   Cardiovascular:      Rate and Rhythm: Normal rate and regular rhythm.      Heart sounds: Normal heart sounds. No murmur heard.  Pulmonary:      Effort: Pulmonary effort is normal.      Breath sounds: Normal breath sounds. No wheezing or rales.   Abdominal:      General: Abdomen is flat. Bowel sounds are normal. There is no distension.      Palpations: Abdomen is soft. There is no mass.      Tenderness: There is no abdominal tenderness.      Hernia: There is no hernia in the left inguinal area or right inguinal area.   Genitourinary:     General: Normal vulva.      Pubic Area: No rash.       Vagina: Normal. No vaginal discharge, erythema, tenderness, bleeding or lesions.      Cervix: Normal.      Uterus: Normal.       Adnexa: Right adnexa normal and left adnexa normal.      Comments: PAP smear done   Musculoskeletal:         General: No swelling, tenderness or deformity. Normal range of motion.      Cervical back: Normal range of motion and neck supple. No muscular tenderness.      Right lower leg: No edema.      Left lower leg: No edema.   Lymphadenopathy:      Cervical: No cervical adenopathy.   Skin:     Coloration: Skin is not jaundiced or pale.      Findings: No erythema, lesion or rash.   Neurological:      General: No focal deficit present.      Mental Status: She is alert and oriented to person, place, and time.      Gait: Gait normal.   Psychiatric:         Mood and Affect: Mood normal.         Behavior: Behavior normal.     Breasts: breasts appear normal, no suspicious masses, no skin or nipple changes or axillary nodes.    Administrative  Statements

## 2024-05-28 ENCOUNTER — TELEPHONE (OUTPATIENT)
Dept: FAMILY MEDICINE CLINIC | Facility: CLINIC | Age: 54
End: 2024-05-28

## 2024-05-28 LAB
CLINICAL INFO: NORMAL
CYTO CVX: NORMAL
CYTOLOGY CMNT CVX/VAG CYTO-IMP: NORMAL
DATE PREVIOUS BX: 0
HPV E6+E7 MRNA CVX QL NAA+PROBE: NOT DETECTED
LMP START DATE: 0
SL AMB PREV. PAP:: 0
SPECIMEN SOURCE CVX/VAG CYTO: NORMAL

## 2024-05-28 NOTE — TELEPHONE ENCOUNTER
----- Message from Ana Cristina Garcia MD sent at 5/28/2024  3:08 PM EDT -----  Please inform the patient her Pap smear is normal

## 2024-06-03 ENCOUNTER — TELEPHONE (OUTPATIENT)
Dept: UROLOGY | Facility: CLINIC | Age: 54
End: 2024-06-03

## 2024-06-03 ENCOUNTER — OFFICE VISIT (OUTPATIENT)
Dept: UROLOGY | Facility: CLINIC | Age: 54
End: 2024-06-03
Payer: COMMERCIAL

## 2024-06-03 VITALS
BODY MASS INDEX: 26.4 KG/M2 | DIASTOLIC BLOOD PRESSURE: 80 MMHG | SYSTOLIC BLOOD PRESSURE: 116 MMHG | HEART RATE: 84 BPM | HEIGHT: 63 IN | OXYGEN SATURATION: 98 % | WEIGHT: 149 LBS

## 2024-06-03 DIAGNOSIS — N39.0 RECURRENT UTI: Primary | ICD-10-CM

## 2024-06-03 DIAGNOSIS — R10.2 PELVIC AND PERINEAL PAIN: ICD-10-CM

## 2024-06-03 LAB
SL AMB  POCT GLUCOSE, UA: NORMAL
SL AMB LEUKOCYTE ESTERASE,UA: NORMAL
SL AMB POCT BILIRUBIN,UA: NORMAL
SL AMB POCT BLOOD,UA: NORMAL
SL AMB POCT CLARITY,UA: CLEAR
SL AMB POCT COLOR,UA: YELLOW
SL AMB POCT KETONES,UA: NORMAL
SL AMB POCT NITRITE,UA: NORMAL
SL AMB POCT PH,UA: 7
SL AMB POCT SPECIFIC GRAVITY,UA: 1
SL AMB POCT URINE PROTEIN: NORMAL
SL AMB POCT UROBILINOGEN: 0.2

## 2024-06-03 PROCEDURE — 81002 URINALYSIS NONAUTO W/O SCOPE: CPT | Performed by: PHYSICIAN ASSISTANT

## 2024-06-03 PROCEDURE — 99213 OFFICE O/P EST LOW 20 MIN: CPT | Performed by: PHYSICIAN ASSISTANT

## 2024-06-03 NOTE — PROGRESS NOTES
UROLOGY PROGRESS NOTE   Patient Identifiers: Clay Coleman (MRN [id number])  Date of Service: 6/3/2024    Subjective:   53-year-old female history of recurrent urinary tract infection.  She also had gross hematuria.  Cystoscopy done in February was unremarkable.  Complains of right-sided pelvic pain.  She is currently not on any medications for her overactive bladder symptoms.  Myrbetriq's was prescribed and is too expensive.  She has tried at least 1 other anticholinergic which was either ineffective or had undesirable side effects.  She does not have any stress leak.  She is accompanied by her daughter and significant other.    Reason for visit: Recurrent UTI follow-up    Objective:     VITALS:    There were no vitals filed for this visit.        LABS:  Lab Results   Component Value Date    HGB 14.5 03/18/2024    HCT 44.4 03/18/2024    WBC 5.1 03/18/2024     03/18/2024   ]    Lab Results   Component Value Date    K 3.7 03/18/2024     03/18/2024    CO2 31 03/18/2024    BUN 15 03/18/2024    CREATININE 0.62 03/18/2024    CALCIUM 9.4 03/18/2024   ]        INPATIENT MEDS:    Current Outpatient Medications:     albuterol (ProAir HFA) 90 mcg/act inhaler, Inhale 2 puffs every 6 (six) hours as needed for shortness of breath (Patient not taking: Reported on 5/19/2023), Disp: 18 g, Rfl: 1    atorvastatin (LIPITOR) 10 mg tablet, TAKE 1/2 TABLET BY MOUTH DAILY, Disp: 45 tablet, Rfl: 1    estradiol (ESTRACE VAGINAL) 0.1 mg/g vaginal cream, Apply pea sized amount around urethra and inside vaginal opening 3 times weekly, Disp: 42.5 g, Rfl: 1    fluticasone (FLONASE) 50 mcg/act nasal spray, SPRAY 2 SPRAYS INTO EACH NOSTRIL EVERY DAY, Disp: 48 mL, Rfl: 1    loratadine (CLARITIN) 10 mg tablet, Take 1 tablet (10 mg total) by mouth daily, Disp: 90 tablet, Rfl: 2    Mirabegron ER 25 MG TB24, Take 25 mg by mouth in the morning, Disp: 30 tablet, Rfl: 3    NON FORMULARY, Apply 30 mL topically daily at bedtime Cleanse & apply 2  pumps to entire face at night or as directed by doctor. (Patient not taking: Reported on 10/1/2022), Disp: , Rfl:       Physical Exam:   There were no vitals taken for this visit.  GEN: no acute distress    RESP: breathing comfortably with no accessory muscle use    ABD: soft, non-tender, non-distended   INCISION:    EXT: no significant peripheral edema       RADIOLOGY:   none     Assessment:   #1.  Overactive bladder with urge incontinence  #2.  Chronic pelvic pain    Plan:   -I recommend she consider Botox intravesical bladder denervation  -I scheduled her for an MRI of the pelvis due to her pelvic pain  -Will call with any concerning results  -

## 2024-06-03 NOTE — TELEPHONE ENCOUNTER
Watching for MRI to be scheduled also      Appt made for Oct 3 with Dr Lyons at 9am      Provider note:  Return for mri pelvis cysto Botox urine culture prior.

## 2024-06-10 ENCOUNTER — HOSPITAL ENCOUNTER (OUTPATIENT)
Dept: ULTRASOUND IMAGING | Facility: HOSPITAL | Age: 54
Discharge: HOME | End: 2024-06-10
Payer: COMMERCIAL

## 2024-06-10 DIAGNOSIS — K76.89 HEPATIC CYST: ICD-10-CM

## 2024-06-10 PROCEDURE — 76705 ECHO EXAM OF ABDOMEN: CPT

## 2024-06-19 DIAGNOSIS — K76.89 LIVER CYST: Primary | ICD-10-CM

## 2024-06-21 PROBLEM — Z01.419 GYNECOLOGIC EXAM NORMAL: Status: RESOLVED | Noted: 2024-05-22 | Resolved: 2024-06-21

## 2024-06-28 ENCOUNTER — HOSPITAL ENCOUNTER (OUTPATIENT)
Dept: MRI IMAGING | Facility: HOSPITAL | Age: 54
Discharge: HOME | End: 2024-06-28
Payer: COMMERCIAL

## 2024-06-28 DIAGNOSIS — R10.2 PELVIC AND PERINEAL PAIN: ICD-10-CM

## 2024-06-28 PROCEDURE — A9585 GADOBUTROL INJECTION: HCPCS | Performed by: PHYSICIAN ASSISTANT

## 2024-06-28 PROCEDURE — 72197 MRI PELVIS W/O & W/DYE: CPT

## 2024-06-28 RX ORDER — GADOBUTROL 604.72 MG/ML
6 INJECTION INTRAVENOUS
Status: COMPLETED | OUTPATIENT
Start: 2024-06-28 | End: 2024-06-28

## 2024-06-28 RX ADMIN — GADOBUTROL 6 ML: 604.72 INJECTION INTRAVENOUS at 07:19

## 2024-07-01 ENCOUNTER — TELEPHONE (OUTPATIENT)
Age: 54
End: 2024-07-01

## 2024-07-01 NOTE — TELEPHONE ENCOUNTER
Patients GI provider:  JUDITH Muhammad    Number to return call: [phone]    Reason for call: Pt returning call for results of US. Message read to patient. No further questions or concerns.    Scheduled procedure/appointment date if applicable: 8/9/24

## 2024-07-25 ENCOUNTER — ANESTHESIA (OUTPATIENT)
Dept: ANESTHESIOLOGY | Facility: HOSPITAL | Age: 54
End: 2024-07-25

## 2024-07-25 ENCOUNTER — ANESTHESIA EVENT (OUTPATIENT)
Dept: ANESTHESIOLOGY | Facility: HOSPITAL | Age: 54
End: 2024-07-25

## 2024-08-08 ENCOUNTER — ANESTHESIA (OUTPATIENT)
Dept: ANESTHESIOLOGY | Facility: HOSPITAL | Age: 54
End: 2024-08-08

## 2024-08-08 ENCOUNTER — ANESTHESIA EVENT (OUTPATIENT)
Dept: ANESTHESIOLOGY | Facility: HOSPITAL | Age: 54
End: 2024-08-08

## 2024-08-08 RX ORDER — SODIUM CHLORIDE, SODIUM LACTATE, POTASSIUM CHLORIDE, CALCIUM CHLORIDE 600; 310; 30; 20 MG/100ML; MG/100ML; MG/100ML; MG/100ML
50 INJECTION, SOLUTION INTRAVENOUS CONTINUOUS
Status: CANCELLED | OUTPATIENT
Start: 2024-08-08

## 2024-08-08 NOTE — ANESTHESIA PREPROCEDURE EVALUATION
Procedure:  PRE-OP ONLY    Relevant Problems   CARDIO   (+) Hyperlipidemia      GI/HEPATIC   (+) Gastroesophageal reflux disease without esophagitis      MUSCULOSKELETAL   (+) Upper back pain, chronic      NEURO/PSYCH   (+) Upper back pain, chronic      PULMONARY   (+) Asthma, mild intermittent             Anesthesia Plan  ASA Score- 2     Anesthesia Type- IV sedation with anesthesia with ASA Monitors.         Additional Monitors:     Airway Plan:            Plan Factors-    Chart reviewed.   Existing labs reviewed. Patient summary reviewed.                  Induction-     Postoperative Plan-         Informed Consent- Anesthetic plan and risks discussed with patient.  I personally reviewed this patient with the CRNA. Discussed and agreed on the Anesthesia Plan with the CRNA..      Lab Results   Component Value Date    HGBA1C 5.8 (H) 03/18/2024       Lab Results   Component Value Date    K 3.7 03/18/2024     03/18/2024    CO2 31 03/18/2024    BUN 15 03/18/2024    CREATININE 0.62 03/18/2024    GLUF 93 09/11/2023    CALCIUM 9.4 03/18/2024    AST 23 03/18/2024    ALT 25 03/18/2024    ALKPHOS 51 03/18/2024    EGFR 105 03/18/2024       Lab Results   Component Value Date    WBC 5.1 03/18/2024    HGB 14.5 03/18/2024    HCT 44.4 03/18/2024    MCV 90.8 03/18/2024     03/18/2024

## 2024-08-09 ENCOUNTER — HOSPITAL ENCOUNTER (OUTPATIENT)
Dept: GASTROENTEROLOGY | Facility: AMBULARY SURGERY CENTER | Age: 54
Setting detail: OUTPATIENT SURGERY
End: 2024-08-09
Payer: COMMERCIAL

## 2024-08-09 ENCOUNTER — ANESTHESIA (OUTPATIENT)
Dept: GASTROENTEROLOGY | Facility: AMBULARY SURGERY CENTER | Age: 54
End: 2024-08-09

## 2024-08-09 ENCOUNTER — ANESTHESIA EVENT (OUTPATIENT)
Dept: GASTROENTEROLOGY | Facility: AMBULARY SURGERY CENTER | Age: 54
End: 2024-08-09

## 2024-08-09 VITALS
SYSTOLIC BLOOD PRESSURE: 99 MMHG | TEMPERATURE: 97.6 F | RESPIRATION RATE: 17 BRPM | BODY MASS INDEX: 25.16 KG/M2 | WEIGHT: 142 LBS | OXYGEN SATURATION: 100 % | HEART RATE: 64 BPM | HEIGHT: 63 IN | DIASTOLIC BLOOD PRESSURE: 59 MMHG

## 2024-08-09 DIAGNOSIS — Z12.11 SCREENING FOR COLON CANCER: ICD-10-CM

## 2024-08-09 DIAGNOSIS — K21.9 CHRONIC GERD: ICD-10-CM

## 2024-08-09 PROCEDURE — G0121 COLON CA SCRN NOT HI RSK IND: HCPCS | Performed by: INTERNAL MEDICINE

## 2024-08-09 PROCEDURE — 43235 EGD DIAGNOSTIC BRUSH WASH: CPT | Performed by: INTERNAL MEDICINE

## 2024-08-09 RX ORDER — FENTANYL CITRATE 50 UG/ML
INJECTION, SOLUTION INTRAMUSCULAR; INTRAVENOUS AS NEEDED
Status: DISCONTINUED | OUTPATIENT
Start: 2024-08-09 | End: 2024-08-09

## 2024-08-09 RX ORDER — SODIUM CHLORIDE, SODIUM LACTATE, POTASSIUM CHLORIDE, CALCIUM CHLORIDE 600; 310; 30; 20 MG/100ML; MG/100ML; MG/100ML; MG/100ML
50 INJECTION, SOLUTION INTRAVENOUS CONTINUOUS
Status: DISCONTINUED | OUTPATIENT
Start: 2024-08-09 | End: 2024-08-13 | Stop reason: HOSPADM

## 2024-08-09 RX ORDER — PROPOFOL 10 MG/ML
INJECTION, EMULSION INTRAVENOUS AS NEEDED
Status: DISCONTINUED | OUTPATIENT
Start: 2024-08-09 | End: 2024-08-09

## 2024-08-09 RX ORDER — LIDOCAINE HYDROCHLORIDE 20 MG/ML
INJECTION, SOLUTION EPIDURAL; INFILTRATION; INTRACAUDAL; PERINEURAL AS NEEDED
Status: DISCONTINUED | OUTPATIENT
Start: 2024-08-09 | End: 2024-08-09

## 2024-08-09 RX ADMIN — SODIUM CHLORIDE, SODIUM LACTATE, POTASSIUM CHLORIDE, AND CALCIUM CHLORIDE: .6; .31; .03; .02 INJECTION, SOLUTION INTRAVENOUS at 09:53

## 2024-08-09 RX ADMIN — PROPOFOL 100 MG: 10 INJECTION, EMULSION INTRAVENOUS at 10:10

## 2024-08-09 RX ADMIN — LIDOCAINE HYDROCHLORIDE 100 MG: 20 INJECTION, SOLUTION EPIDURAL; INFILTRATION; INTRACAUDAL at 10:10

## 2024-08-09 RX ADMIN — PROPOFOL 30 MG: 10 INJECTION, EMULSION INTRAVENOUS at 10:15

## 2024-08-09 RX ADMIN — FENTANYL CITRATE 50 MCG: 50 INJECTION INTRAMUSCULAR; INTRAVENOUS at 10:07

## 2024-08-09 NOTE — H&P
History and Physical -  [facility]  Clay Dixon 53 y.o. female MRN: [id number]        HPI: 53-year-old female with history of GERD was referred for screening colonoscopy.  Regular bowel movements.    Historical Information   Past Medical History:   Diagnosis Date    Leukocytosis     last assessed 7/31/17     Past Surgical History:   Procedure Laterality Date    BLADDER SURGERY      bladder sling? approx 7 years    CYSTOSCOPY      diagnostic,last assessed 10/17/17    OTHER SURGICAL HISTORY      suprapublic sling     Social History   Social History     Substance and Sexual Activity   Alcohol Use Yes    Comment: social     Social History     Substance and Sexual Activity   Drug Use No     Social History     Tobacco Use   Smoking Status Never   Smokeless Tobacco Never     No family history on file.    Meds/Allergies     Not in a hospital admission.    Allergies   Allergen Reactions    Pollen Extract        Objective     There were no vitals taken for this visit.    Physical Exam:    Chest- CTA  Heart- RRR  Abdomen- NT/ND  Extremities- No edema    ASSESSMENT:     Screening for colon cancer, GERD    PLAN:    EGD and colonoscopy

## 2024-08-09 NOTE — ANESTHESIA POSTPROCEDURE EVALUATION
Post-Op Assessment Note    CV Status:  Stable  Pain Score: 0    Pain management: adequate       Mental Status:  Awake and alert   Hydration Status:  Stable   PONV Controlled:  None   Airway Patency:  Patent and adequate     Post Op Vitals Reviewed: Yes    No anethesia notable event occurred.    Staff: CRNA, Anesthesiologist               BP   101/57   Temp      Pulse  60   Resp   16   SpO2   100

## 2024-08-09 NOTE — ANESTHESIA PREPROCEDURE EVALUATION
Procedure:  COLONOSCOPY  EGD    Relevant Problems   CARDIO   (+) Hyperlipidemia      GI/HEPATIC   (+) Gastroesophageal reflux disease without esophagitis      MUSCULOSKELETAL   (+) Upper back pain, chronic      NEURO/PSYCH   (+) Upper back pain, chronic      PULMONARY   (+) Asthma, mild intermittent        Physical Exam    Airway    Mallampati score: II  TM Distance: >3 FB  Neck ROM: full     Dental       Cardiovascular  Cardiovascular exam normal    Pulmonary  Pulmonary exam normal     Other Findings  post-pubertal.      Anesthesia Plan  ASA Score- 2     Anesthesia Type- IV sedation with anesthesia with ASA Monitors.         Additional Monitors:     Airway Plan:            Plan Factors-Exercise tolerance (METS): >4 METS.    Chart reviewed.   Existing labs reviewed. Patient summary reviewed.    Patient is not a current smoker. Patient not instructed to abstain from smoking on day of procedure. Patient did not smoke on day of surgery.            Induction-     Postoperative Plan-         Informed Consent- Anesthetic plan and risks discussed with patient.  I personally reviewed this patient with the CRNA. Discussed and agreed on the Anesthesia Plan with the CRNA..      Lab Results   Component Value Date    HGBA1C 5.8 (H) 03/18/2024       Lab Results   Component Value Date    K 3.7 03/18/2024     03/18/2024    CO2 31 03/18/2024    BUN 15 03/18/2024    CREATININE 0.62 03/18/2024    GLUF 93 09/11/2023    CALCIUM 9.4 03/18/2024    AST 23 03/18/2024    ALT 25 03/18/2024    ALKPHOS 51 03/18/2024    EGFR 105 03/18/2024       Lab Results   Component Value Date    WBC 5.1 03/18/2024    HGB 14.5 03/18/2024    HCT 44.4 03/18/2024    MCV 90.8 03/18/2024     03/18/2024

## 2024-09-23 ENCOUNTER — APPOINTMENT (OUTPATIENT)
Dept: LAB | Facility: CLINIC | Age: 54
End: 2024-09-23
Payer: COMMERCIAL

## 2024-09-23 DIAGNOSIS — N39.0 RECURRENT UTI: ICD-10-CM

## 2024-09-23 DIAGNOSIS — E78.2 MIXED HYPERLIPIDEMIA: ICD-10-CM

## 2024-09-23 DIAGNOSIS — R73.03 PREDIABETES: ICD-10-CM

## 2024-09-23 DIAGNOSIS — R22.1 NECK MASS: ICD-10-CM

## 2024-09-23 DIAGNOSIS — R10.2 PELVIC AND PERINEAL PAIN: ICD-10-CM

## 2024-09-23 PROCEDURE — 87086 URINE CULTURE/COLONY COUNT: CPT

## 2024-09-25 ENCOUNTER — TELEPHONE (OUTPATIENT)
Age: 54
End: 2024-09-25

## 2024-09-25 LAB — BACTERIA UR CULT: NORMAL

## 2024-09-25 NOTE — TELEPHONE ENCOUNTER
Pt called and confirmed appt with Dr. Lyons on 10/3/24.  Pt sts that she completed the Urine culture on 9/23/24 and MRI on 6/28/24, but never received the results.  Pt is requesting a call back to discuss results prior to her appt on 10/3/24.    Pt call back: [phone]

## 2024-10-01 LAB
ALBUMIN SERPL-MCNC: 4.5 G/DL (ref 3.6–5.1)
ALBUMIN/GLOB SERPL: 1.7 (CALC) (ref 1–2.5)
ALP SERPL-CCNC: 47 U/L (ref 37–153)
ALT SERPL-CCNC: 18 U/L (ref 6–29)
AST SERPL-CCNC: 18 U/L (ref 10–35)
BASOPHILS # BLD AUTO: 21 CELLS/UL (ref 0–200)
BASOPHILS NFR BLD AUTO: 0.5 %
BILIRUB SERPL-MCNC: 0.7 MG/DL (ref 0.2–1.2)
BUN SERPL-MCNC: 11 MG/DL (ref 7–25)
BUN/CREAT SERPL: ABNORMAL (CALC) (ref 6–22)
CALCIUM SERPL-MCNC: 9.1 MG/DL (ref 8.6–10.4)
CHLORIDE SERPL-SCNC: 99 MMOL/L (ref 98–110)
CHOLEST SERPL-MCNC: 170 MG/DL
CHOLEST/HDLC SERPL: 3.3 (CALC)
CO2 SERPL-SCNC: 31 MMOL/L (ref 20–32)
CREAT SERPL-MCNC: 0.64 MG/DL (ref 0.5–1.03)
EOSINOPHIL # BLD AUTO: 70 CELLS/UL (ref 15–500)
EOSINOPHIL NFR BLD AUTO: 1.7 %
ERYTHROCYTE [DISTWIDTH] IN BLOOD BY AUTOMATED COUNT: 11.8 % (ref 11–15)
GFR/BSA.PRED SERPLBLD CYS-BASED-ARV: 104 ML/MIN/1.73M2
GLOBULIN SER CALC-MCNC: 2.6 G/DL (CALC) (ref 1.9–3.7)
GLUCOSE SERPL-MCNC: 111 MG/DL (ref 65–99)
HBA1C MFR BLD: 6.1 % OF TOTAL HGB
HCT VFR BLD AUTO: 44.3 % (ref 35–45)
HDLC SERPL-MCNC: 52 MG/DL
HGB BLD-MCNC: 15 G/DL (ref 11.7–15.5)
LDLC SERPL CALC-MCNC: 100 MG/DL (CALC)
LYMPHOCYTES # BLD AUTO: 1607 CELLS/UL (ref 850–3900)
LYMPHOCYTES NFR BLD AUTO: 39.2 %
MCH RBC QN AUTO: 30.5 PG (ref 27–33)
MCHC RBC AUTO-ENTMCNC: 33.9 G/DL (ref 32–36)
MCV RBC AUTO: 90.2 FL (ref 80–100)
MONOCYTES # BLD AUTO: 221 CELLS/UL (ref 200–950)
MONOCYTES NFR BLD AUTO: 5.4 %
NEUTROPHILS # BLD AUTO: 2181 CELLS/UL (ref 1500–7800)
NEUTROPHILS NFR BLD AUTO: 53.2 %
NONHDLC SERPL-MCNC: 118 MG/DL (CALC)
PLATELET # BLD AUTO: 180 THOUSAND/UL (ref 140–400)
PMV BLD REES-ECKER: 12.3 FL (ref 7.5–12.5)
POTASSIUM SERPL-SCNC: 3.9 MMOL/L (ref 3.5–5.3)
PROT SERPL-MCNC: 7.1 G/DL (ref 6.1–8.1)
RBC # BLD AUTO: 4.91 MILLION/UL (ref 3.8–5.1)
SODIUM SERPL-SCNC: 136 MMOL/L (ref 135–146)
TRIGL SERPL-MCNC: 87 MG/DL
TSH SERPL-ACNC: 1.1 MIU/L
WBC # BLD AUTO: 4.1 THOUSAND/UL (ref 3.8–10.8)

## 2024-10-03 ENCOUNTER — PROCEDURE VISIT (OUTPATIENT)
Dept: UROLOGY | Facility: CLINIC | Age: 54
End: 2024-10-03
Payer: COMMERCIAL

## 2024-10-03 VITALS
OXYGEN SATURATION: 99 % | DIASTOLIC BLOOD PRESSURE: 64 MMHG | BODY MASS INDEX: 25.55 KG/M2 | WEIGHT: 144.2 LBS | SYSTOLIC BLOOD PRESSURE: 112 MMHG | HEART RATE: 78 BPM | HEIGHT: 63 IN

## 2024-10-03 DIAGNOSIS — R10.2 PELVIC AND PERINEAL PAIN: ICD-10-CM

## 2024-10-03 DIAGNOSIS — R39.15 URGENCY OF URINATION: ICD-10-CM

## 2024-10-03 DIAGNOSIS — N32.81 OVERACTIVE BLADDER: Primary | ICD-10-CM

## 2024-10-03 DIAGNOSIS — N39.0 RECURRENT UTI: ICD-10-CM

## 2024-10-03 PROCEDURE — 52287 CYSTOSCOPY CHEMODENERVATION: CPT | Performed by: UROLOGY

## 2024-10-03 RX ORDER — RIBOFLAVIN (VITAMIN B2) 100 MG
100 TABLET ORAL DAILY
COMMUNITY

## 2024-10-03 RX ORDER — DIPHENOXYLATE HYDROCHLORIDE AND ATROPINE SULFATE 2.5; .025 MG/1; MG/1
1 TABLET ORAL DAILY
COMMUNITY

## 2024-10-03 NOTE — ASSESSMENT & PLAN NOTE
Patient had persistent right-sided pelvic pain prompting MRI which was unremarkable.  Pain is now resolved.  Unclear what the etiology was.

## 2024-10-03 NOTE — ASSESSMENT & PLAN NOTE
The patient thought she was having issues recurrent UTIs although culture data did not support this.  We discussed options and she wanted to try an estrogen cream which she has been doing at thinks has been helping with her symptoms.  Since she is tolerating it well plan to continue.  She is only using it once a week while most room and use it more often but since she is doing well I do not think she needs a change to regimen.

## 2024-10-03 NOTE — PROGRESS NOTES
Assessment/Plan:    Recurrent UTI  The patient thought she was having issues recurrent UTIs although culture data did not support this.  We discussed options and she wanted to try an estrogen cream which she has been doing at thinks has been helping with her symptoms.  Since she is tolerating it well plan to continue.  She is only using it once a week while most room and use it more often but since she is doing well I do not think she needs a change to regimen.    Pelvic and perineal pain  Patient had persistent right-sided pelvic pain prompting MRI which was unremarkable.  Pain is now resolved.  Unclear what the etiology was.    Urgency of urination  Patient underwent cystoscopy with Botox 100 units today.  She will see us back in 2 months to reassess how she is doing.  If she found Botox useful we can again to be done in 3 to 4 months from now.  She found today's procedure is slightly painful so she may opt to do this in the operating room which we discussed as an option          Subjective:      Patient ID: Clay Dixon is a 53 y.o. female.    HPI  Clay Dixon is a 53 y.o. female with distant history of urethral sling now with gross hematuria and recurrent urine tract infections..     Patient had concerns for recurrent urinary infections but  ultimately only had 1 low colony count positive culture.  She has had symptoms despite negative cultures.  Symptoms typically include frequency and urgency.  Primary care has started her on oxybutynin however noticed significant xerostomia and therefore discontinued.  Myrbetriq was prescribed and was too expensive. She has tried at least 1 other anticholinergic which was either ineffective or had undesirable side effects.      Because of her questionable history of recurrent UTIs cystoscopy was performed in February 2024 which was unremarkable.  We then started her on vaginal estrogen which she thinks is helping overall with her infectious issues.    Still bothered by  frequency and urgency.  Therefore she was set up for Botox which was performed today with 100 units.       She does report 2 episodes of gross hematuria.  This prompted a CT IVP in January 2024 which was unremarkable and the aforementioned cystoscopy which was unremarkable.  She also had a urine cytology in November 2023 which was negative. Has a prior history of hematuria workup in 2017 with negative cystoscopy, cytology and CT stone protocol at that time.     History of urethral sling over 10 years ago for stress incontinence.  No continued leakage.     She has had issues with persistent right-sided pelvic pain prompting MRI of the pelvis June 2024 which was unremarkable.  Ports that pain has now resolved.    Medical comorbidities include prediabetes, thyroid nodule, GERD.     Past Surgical History:   Procedure Laterality Date    BLADDER SURGERY      bladder sling? approx 7 years    COSMETIC SURGERY      CYSTOSCOPY      diagnostic,last assessed 10/17/17    OTHER SURGICAL HISTORY      suprapublic sling        Past Medical History:   Diagnosis Date    Asthma     GERD (gastroesophageal reflux disease)     Hyperlipidemia     Leukocytosis     last assessed 7/31/17             Review of Systems   Constitutional:  Negative for chills and fever.   HENT:  Negative for ear pain and sore throat.    Eyes:  Negative for pain and visual disturbance.   Respiratory:  Negative for cough and shortness of breath.    Cardiovascular:  Negative for chest pain and palpitations.   Gastrointestinal:  Negative for abdominal pain and vomiting.   Genitourinary:  Positive for frequency and urgency. Negative for dysuria and hematuria.   Musculoskeletal:  Negative for arthralgias and back pain.   Skin:  Negative for color change and rash.   Neurological:  Negative for seizures and syncope.   All other systems reviewed and are negative.        Objective:      /64 (BP Location: Left arm, Patient Position: Sitting, Cuff Size: Adult)   Pulse  "78   Ht 5' 3\" (1.6 m)   Wt 65.4 kg (144 lb 3.2 oz)   SpO2 99%   BMI 25.54 kg/m²     No results found for: \"PSA\"       Physical Exam  Vitals reviewed.   Constitutional:       General: She is not in acute distress.     Appearance: Normal appearance. She is not ill-appearing, toxic-appearing or diaphoretic.   HENT:      Head: Normocephalic and atraumatic.   Eyes:      Extraocular Movements: Extraocular movements intact.      Pupils: Pupils are equal, round, and reactive to light.   Pulmonary:      Effort: Pulmonary effort is normal.   Abdominal:      General: Abdomen is flat. There is no distension.      Palpations: Abdomen is soft. There is no mass.      Tenderness: There is no abdominal tenderness. There is no guarding or rebound.      Hernia: No hernia is present.   Skin:     General: Skin is warm.   Neurological:      General: No focal deficit present.      Mental Status: She is alert and oriented to person, place, and time. Mental status is at baseline.   Psychiatric:         Mood and Affect: Mood normal.         Behavior: Behavior normal.         Thought Content: Thought content normal.              Cystoscopy     Date/Time  10/3/2024 9:00 AM     Performed by  Jeffry Lyons MD   Authorized by  Jeffry Lyons MD     Universal Protocol:  Consent: Written consent obtained.  Risks and benefits: risks, benefits and alternatives were discussed  Consent given by: patient  Time out: Immediately prior to procedure a \"time out\" was called to verify the correct patient, procedure, equipment, support staff and site/side marked as required.  Patient understanding: patient states understanding of the procedure being performed  Patient consent: the patient's understanding of the procedure matches consent given  Procedure consent: procedure consent matches procedure scheduled  Patient identity confirmed: verbally with patient      Procedure Details:  Procedure type: cystoscopy, injection for chemodenervation    Patient " tolerance: Patient tolerated the procedure well with no immediate complications    Additional Procedure Details: A female MA was in the room and served as a chaperone and assistant    The patient's bladder was numbed with liquid lidocaine inserted via catheter which dwelled for 5 minutes.    100 units of botox were sterily reconstituted into 10cc of saline on the back table.    The patient's external genitals were sterilely prepped and draped.  Viscous lidocaine was introduced into the urethra  A flexible cystoscope was introduced into the urethra.    Urethra: Normal  Bladder: No lesions. Ureteral orifices in orthotopic position.  No diverticula or trabeculations    A flexible needle was used through the scope. It was deployed to 3mm depth.   The bladder was then injected in 15 point template along the back wall with 0.5cc injected each time under visualization. Care was taken to avoid inject of the trigone.  After injections were complete the bladder was surveyed and there was no significant bleeding seen.        MRI OF THE PELVIS WITH AND WITHOUT CONTRAST (GYNECOLOGIC)     INDICATION: 53 years / Female. R10.2: Pelvic and perineal pain. History of recurrent urinary tract infections as well as gross hematuria. Unremarkable cystoscopy in February. Patient with chronic right-sided pelvic pain.     COMPARISON: Ultrasound pelvis 12/10/2019.     TECHNIQUE: Multiplanar/multisequence MRI of the female pelvis was performed before and after administration of contrast.     IV Contrast: 6 mL of Gadobutrol injection (SINGLE-DOSE)     FINDINGS:     UTERUS:  Junctional zone: Normal thickness.  Myometrium: 5 x 5 mm anterior uterine body intramural fibroid (4/26; 5/16).  Endometrium: Normal thickness without mass.     ADNEXA:  Right:  Ovary normal in size and morphology.  No suspicious adnexal lesion.  No hydrosalpinx.     Left:  Ovary normal in size and morphology.  No suspicious adnexal lesion.  No hydrosalpinx.     URINARY  BLADDER: Normal.     PELVIC CAVITY: No lymphadenopathy. No ascites.     BOWEL: No dilated loops of bowel.     VESSELS: No aneurysm.     PELVIC WALL: Unremarkable     BONES: No suspicious osseous lesion.     IMPRESSION:     1.  Subcentimeter anterior uterine body intramural fibroid. The endometrium is unremarkable.  2.  Unremarkable ovaries.       Orders  Orders Placed This Encounter   Procedures    Cystoscopy     This order was created via procedure documentation

## 2024-10-03 NOTE — ASSESSMENT & PLAN NOTE
Patient underwent cystoscopy with Botox 100 units today.  She will see us back in 2 months to reassess how she is doing.  If she found Botox useful we can again to be done in 3 to 4 months from now.  She found today's procedure is slightly painful so she may opt to do this in the operating room which we discussed as an option

## 2024-10-14 ENCOUNTER — OFFICE VISIT (OUTPATIENT)
Dept: FAMILY MEDICINE CLINIC | Facility: CLINIC | Age: 54
End: 2024-10-14
Payer: COMMERCIAL

## 2024-10-14 VITALS
HEIGHT: 63 IN | WEIGHT: 141 LBS | DIASTOLIC BLOOD PRESSURE: 70 MMHG | OXYGEN SATURATION: 100 % | BODY MASS INDEX: 24.98 KG/M2 | SYSTOLIC BLOOD PRESSURE: 112 MMHG | RESPIRATION RATE: 16 BRPM | HEART RATE: 80 BPM

## 2024-10-14 DIAGNOSIS — J45.20 MILD INTERMITTENT ASTHMA WITHOUT COMPLICATION: ICD-10-CM

## 2024-10-14 DIAGNOSIS — E78.2 MIXED HYPERLIPIDEMIA: ICD-10-CM

## 2024-10-14 DIAGNOSIS — R73.03 PREDIABETES: Primary | ICD-10-CM

## 2024-10-14 DIAGNOSIS — J30.1 SEASONAL ALLERGIC RHINITIS DUE TO POLLEN: ICD-10-CM

## 2024-10-14 PROCEDURE — 99214 OFFICE O/P EST MOD 30 MIN: CPT | Performed by: FAMILY MEDICINE

## 2024-10-14 NOTE — PROGRESS NOTES
Ambulatory Visit  Name: Clay Dixon      : 1970      MRN: [id number]  Encounter Provider: Ana Cristina Garcia MD  Encounter Date: 10/14/2024   Encounter department: [facility]    Assessment & Plan  Prediabetes  Lab Results   Component Value Date    HGBA1C 6.1 (H) 2024   Cont low carb diet       Orders:    Comprehensive metabolic panel; Future    Hemoglobin A1C; Future    Mixed hyperlipidemia  She takes Lipitor every other day and her cholesterol remains stable, she says if she take every day she feels back pain so she cannot take it daily advised to continue every other day as her cholesterol is stable    Orders:    Lipid panel; Future    Mild intermittent asthma without complication  She use only sometimes stable         Seasonal allergic rhinitis due to pollen  Currently not taking any medication and she is stable  abs and follow-up 6-month       History of Present Illness     Follow-up on labs, she is prediabetic, she tries to eat healthy, takes atorvastatin every other day because she feels back pain if she take every day.      Review of Systems   Constitutional:  Negative for activity change, appetite change, chills, fatigue, fever and unexpected weight change.   HENT:  Negative for congestion, ear discharge, ear pain, nosebleeds, postnasal drip, rhinorrhea, sinus pressure, sneezing, sore throat, trouble swallowing and voice change.    Eyes:  Negative for photophobia, pain, discharge, redness and itching.   Respiratory:  Negative for cough, chest tightness, shortness of breath and wheezing.    Cardiovascular:  Negative for chest pain, palpitations and leg swelling.   Gastrointestinal:  Negative for abdominal pain, constipation, diarrhea, nausea and vomiting.   Endocrine: Negative for polyuria.   Genitourinary:  Negative for dysuria, frequency and urgency.   Musculoskeletal:  Negative for arthralgias, back pain, myalgias and neck pain.   Skin:  Negative for color change, pallor  and rash.   Allergic/Immunologic: Negative for environmental allergies and food allergies.   Neurological:  Negative for dizziness, weakness, light-headedness and headaches.   Hematological:  Negative for adenopathy. Does not bruise/bleed easily.   Psychiatric/Behavioral:  Negative for behavioral problems. The patient is not nervous/anxious.      Past Medical History:   Diagnosis Date    Asthma     GERD (gastroesophageal reflux disease)     Hyperlipidemia     Leukocytosis     last assessed 7/31/17     Past Surgical History:   Procedure Laterality Date    BLADDER SURGERY      bladder sling? approx 7 years    COSMETIC SURGERY      CYSTOSCOPY      diagnostic,last assessed 10/17/17    OTHER SURGICAL HISTORY      suprapublic sling     No family history on file.  Social History     Tobacco Use    Smoking status: Never    Smokeless tobacco: Never   Vaping Use    Vaping status: Never Used   Substance and Sexual Activity    Alcohol use: Yes     Comment: social    Drug use: No    Sexual activity: Yes     Partners: Male     Current Outpatient Medications on File Prior to Visit   Medication Sig    albuterol (ProAir HFA) 90 mcg/act inhaler Inhale 2 puffs every 6 (six) hours as needed for shortness of breath (Patient not taking: Reported on 5/19/2023)    Ascorbic Acid (vitamin C) 100 MG tablet Take 100 mg by mouth daily    atorvastatin (LIPITOR) 10 mg tablet TAKE 1/2 TABLET BY MOUTH DAILY    estradiol (ESTRACE VAGINAL) 0.1 mg/g vaginal cream Apply pea sized amount around urethra and inside vaginal opening 3 times weekly    fluticasone (FLONASE) 50 mcg/act nasal spray SPRAY 2 SPRAYS INTO EACH NOSTRIL EVERY DAY (Patient not taking: Reported on 10/3/2024)    loratadine (CLARITIN) 10 mg tablet Take 1 tablet (10 mg total) by mouth daily (Patient not taking: Reported on 10/3/2024)    Mirabegron ER 25 MG TB24 Take 25 mg by mouth in the morning (Patient not taking: Reported on 10/3/2024)    multivitamin (THERAGRAN) TABS Take 1 tablet by  "mouth daily    NON FORMULARY Apply 30 mL topically daily at bedtime Cleanse & apply 2 pumps to entire face at night or as directed by doctor. (Patient not taking: Reported on 10/1/2022)     Allergies   Allergen Reactions    Pollen Extract      Immunization History   Administered Date(s) Administered    Influenza Quadrivalent Preservative Free 3 years and older IM 11/02/2016, 11/20/2017    Influenza, recombinant, quadrivalent,injectable, preservative free 11/27/2018, 11/26/2019, 09/15/2020    Influenza, seasonal, injectable 10/18/2011, 11/05/2013    TD (adult) Preservative Free 11/05/2013    Tdap 11/05/2013     Objective     /70   Pulse 80   Resp 16   Ht 5' 3\" (1.6 m)   Wt 64 kg (141 lb)   SpO2 100%   BMI 24.98 kg/m²     Physical Exam  Vitals and nursing note reviewed.   Constitutional:       General: She is not in acute distress.     Appearance: Normal appearance. She is not ill-appearing.   HENT:      Head: Normocephalic and atraumatic.      Right Ear: Ear canal normal. There is impacted cerumen.      Left Ear: Ear canal normal. There is impacted cerumen.      Nose: Nose normal. No congestion or rhinorrhea.      Mouth/Throat:      Mouth: Mucous membranes are moist.      Pharynx: Oropharynx is clear. No oropharyngeal exudate or posterior oropharyngeal erythema.   Eyes:      General: No scleral icterus.        Right eye: No discharge.         Left eye: No discharge.      Extraocular Movements: Extraocular movements intact.      Conjunctiva/sclera: Conjunctivae normal.      Pupils: Pupils are equal, round, and reactive to light.   Cardiovascular:      Rate and Rhythm: Normal rate and regular rhythm.      Heart sounds: Normal heart sounds. No murmur heard.  Pulmonary:      Effort: Pulmonary effort is normal.      Breath sounds: Normal breath sounds. No wheezing or rales.   Abdominal:      General: Abdomen is flat. Bowel sounds are normal. There is no distension.      Palpations: Abdomen is soft. There is no " mass.      Tenderness: There is no abdominal tenderness.   Musculoskeletal:         General: No swelling, tenderness or deformity. Normal range of motion.      Cervical back: Normal range of motion and neck supple. No muscular tenderness.      Right lower leg: No edema.      Left lower leg: No edema.   Lymphadenopathy:      Cervical: No cervical adenopathy.   Skin:     Coloration: Skin is not jaundiced or pale.      Findings: No erythema, lesion or rash.   Neurological:      General: No focal deficit present.      Mental Status: She is alert and oriented to person, place, and time.      Gait: Gait normal.   Psychiatric:         Mood and Affect: Mood normal.         Behavior: Behavior normal.

## 2024-10-14 NOTE — ASSESSMENT & PLAN NOTE
She takes Lipitor every other day and her cholesterol remains stable, she says if she take every day she feels back pain so she cannot take it daily advised to continue every other day as her cholesterol is stable    Orders:    Lipid panel; Future

## 2024-10-14 NOTE — ASSESSMENT & PLAN NOTE
Lab Results   Component Value Date    HGBA1C 6.1 (H) 09/30/2024   Cont low carb diet       Orders:    Comprehensive metabolic panel; Future    Hemoglobin A1C; Future     Detail Level: Zone Detail Level: Generalized Detail Level: Simple allopurinol 100 mg oral tablet: 1 tab(s) orally once a day    amLODIPine 10 mg oral tablet: 1 tab(s) orally once a day  atorvastatin 40 mg oral tablet: 1 tab(s) orally once a day  Centrum Silver oral tablet: 1 tab(s) orally once a day  colchicine 0.6 mg oral tablet: 1 tab(s) orally once a day as needed for  ezetimibe 10 mg oral tablet: 1 tab(s) orally once a day  fluticasone 50 mcg/inh nasal spray: 2 spray(s) in each nostril once a day (in the evening)  losartan 50 mg oral tablet: 0.5 tab(s) orally once a day  Mounjaro 2.5 mg/0.5 mL subcutaneous solution: 2.5 milligram(s) subcutaneously once a week on Thursday  Myrbetriq 50 mg oral tablet, extended release: 1 tab(s) orally once a day  tamsulosin 0.4 mg oral capsule: 1 cap(s) orally once a day  Tylenol 325 mg oral tablet: 2 tab(s) orally once a day As needed for pain

## 2024-11-02 PROBLEM — N39.0 RECURRENT UTI: Status: RESOLVED | Noted: 2022-06-02 | Resolved: 2024-11-02

## 2024-11-25 ENCOUNTER — APPOINTMENT (OUTPATIENT)
Dept: LAB | Facility: CLINIC | Age: 54
End: 2024-11-25
Payer: COMMERCIAL

## 2024-11-25 ENCOUNTER — NURSE TRIAGE (OUTPATIENT)
Age: 54
End: 2024-11-25

## 2024-11-25 DIAGNOSIS — R39.9 UTI SYMPTOMS: Primary | ICD-10-CM

## 2024-11-25 DIAGNOSIS — E78.2 MIXED HYPERLIPIDEMIA: ICD-10-CM

## 2024-11-25 DIAGNOSIS — R39.9 UTI SYMPTOMS: ICD-10-CM

## 2024-11-25 DIAGNOSIS — R73.03 PREDIABETES: ICD-10-CM

## 2024-11-25 LAB

## 2024-11-25 PROCEDURE — 87086 URINE CULTURE/COLONY COUNT: CPT

## 2024-11-25 PROCEDURE — 81001 URINALYSIS AUTO W/SCOPE: CPT

## 2024-11-25 NOTE — TELEPHONE ENCOUNTER
Patient and  called in. Patient had botox 10/3. States her symptoms never really improved. Still w/ urinary frequency and urgency. Somewhat of a language barrier so  on phone assisting as well. States over the last week she is having pain in vagina that comes and goes. Says it comes and starts mild and then gets stronger and then passes. Asked her to pinpoint area of pain more, she states she doesn't feel it with urination and doesn't feel it with sexual intercourse but describes it as in the vagina. Rating it about a 5. Ordered urine testing to rule out UTI. Reviewed Hydration, avoidance of bladder irritants and ED precautions. Please advise.       Reason for Disposition   The patient has mild pain and/or history of interstitial cystitis    Answer Assessment - Initial Assessment Questions  1. When did your symptoms start?   Last week  2. Do you experience pain or burning with every void?   Pain in vagina  3. Do you have any other urinary symptoms such as urinary frequency, urgency, incontinence, blood in your urine?   Urgency and frequency  4. Do you have any abdominal pain or flank pain?  Pain in vagina  5. Do you have a fever of 101 or higher? How did you take your temperature?   No fever, but feels cold when she has the pain  6. Does your urine stream spray? (Specifically for males)   N/A  7. Have you become unable to urinate?   no  8. Do you have a history of urinary tract infections?   yes  9. Have you had a recent urologic procedure, surgery, or any recent urine testing?   Botox in October  10. Have you ever been tested for an STD?   no    Protocols used: Urology-Dysuria-ADULT-[place]

## 2024-11-25 NOTE — TELEPHONE ENCOUNTER
Has had negative work-up with pelvic MRI and cystoscopy. Agree with urine testing to rule out UTI. Consider seeing GYN for vaginal pain

## 2024-11-26 ENCOUNTER — RESULTS FOLLOW-UP (OUTPATIENT)
Dept: UROLOGY | Facility: CLINIC | Age: 54
End: 2024-11-26

## 2024-11-26 LAB — BACTERIA UR CULT: NORMAL

## 2024-11-26 NOTE — TELEPHONE ENCOUNTER
Culture was resulted and sent to result notes. Patient was made aware that per provider they do not have an infection

## 2024-11-26 NOTE — TELEPHONE ENCOUNTER
Called and spoke directly with patient. Advised of note below:      ----- Message from OSWALD Dodson sent at 11/26/2024  9:06 AM EST -----  Patient's urine culture shows some mixed contaminants. This is negative for infection.       Patient thanked the office for the call.

## 2024-12-10 ENCOUNTER — NURSE TRIAGE (OUTPATIENT)
Age: 54
End: 2024-12-10

## 2024-12-10 ENCOUNTER — APPOINTMENT (OUTPATIENT)
Dept: LAB | Facility: CLINIC | Age: 54
End: 2024-12-10
Payer: COMMERCIAL

## 2024-12-10 DIAGNOSIS — R39.9 UTI SYMPTOMS: Primary | ICD-10-CM

## 2024-12-10 LAB
BACTERIA UR QL AUTO: NORMAL /HPF
BILIRUB UR QL STRIP: NEGATIVE
CLARITY UR: CLEAR
COLOR UR: COLORLESS
GLUCOSE UR STRIP-MCNC: NEGATIVE MG/DL
HGB UR QL STRIP.AUTO: NEGATIVE
KETONES UR STRIP-MCNC: NEGATIVE MG/DL
LEUKOCYTE ESTERASE UR QL STRIP: NEGATIVE
NITRITE UR QL STRIP: NEGATIVE
NON-SQ EPI CELLS URNS QL MICRO: NORMAL /HPF
PH UR STRIP.AUTO: 6.5 [PH]
PROT UR STRIP-MCNC: NEGATIVE MG/DL
RBC #/AREA URNS AUTO: NORMAL /HPF
SP GR UR STRIP.AUTO: 1 (ref 1–1.03)
UROBILINOGEN UR STRIP-ACNC: <2 MG/DL
WBC #/AREA URNS AUTO: NORMAL /HPF

## 2024-12-10 PROCEDURE — 87086 URINE CULTURE/COLONY COUNT: CPT

## 2024-12-10 PROCEDURE — 81001 URINALYSIS AUTO W/SCOPE: CPT

## 2024-12-10 NOTE — TELEPHONE ENCOUNTER
Patient is experiencing painful urination that started last night and can't seem to hold it in    Warm transferred to triage for further assistance

## 2024-12-11 LAB — BACTERIA UR CULT: NORMAL

## 2024-12-12 NOTE — TELEPHONE ENCOUNTER
Called and spoke with Clay Dixon and informed of urine testing results. She reports still having urinary frequency and pain. States she is drinking about 4 bottles of water a day. Encouraged increase hydration and PVR with nursing to ensure she is emptying her bladder. Pt in agreement. Appt scheduled for tomorrow with nursing. Verbalized understanding.         Chuy Rodrigez PA-C  You48 minutes ago (8:13 AM)       Could bring her in and check a PVR if this is persistent.

## 2024-12-13 ENCOUNTER — PROCEDURE VISIT (OUTPATIENT)
Dept: UROLOGY | Facility: CLINIC | Age: 54
End: 2024-12-13
Payer: COMMERCIAL

## 2024-12-13 VITALS
OXYGEN SATURATION: 100 % | HEART RATE: 70 BPM | DIASTOLIC BLOOD PRESSURE: 70 MMHG | HEIGHT: 63 IN | BODY MASS INDEX: 24.98 KG/M2 | SYSTOLIC BLOOD PRESSURE: 110 MMHG

## 2024-12-13 DIAGNOSIS — R39.9 UTI SYMPTOMS: Primary | ICD-10-CM

## 2024-12-13 LAB — POST-VOID RESIDUAL VOLUME, ML POC: 75 ML

## 2024-12-13 PROCEDURE — 51798 US URINE CAPACITY MEASURE: CPT

## 2024-12-13 NOTE — PROGRESS NOTES
Pt seen in office for PVR. Pt c/o urinary frequency, urgency, vaginal pain, and some incontinence. Pt explained that her symptoms come and go. Pt had bladder botox done in October and pt says she has felt worse ever since. Urine testing on 12/10 negative. Pt voided in office, PVR 75mL.     Discussed plan of care with OSWALD Shrestha. She advised that pt should avoid bladder irritants until her f/u in January. This was relayed to pt, she verbalized understanding, but states that she has been avoiding bladder irritants. Pt scheduled for f/u with OSWALD on 1/13. Pt stated she would f/u with gynecology for her vaginal pain.  Pt asked for and was provided with [facility] phone number. Pt states she will call and make an appt. Advised pt to call office if symptoms worsen.       Recent Results (from the past hour)   POCT Measure PVR    Collection Time: 12/13/24 11:19 AM   Result Value Ref Range    POST-VOID RESIDUAL VOLUME, ML POC 75 mL

## 2024-12-19 ENCOUNTER — APPOINTMENT (OUTPATIENT)
Dept: LAB | Facility: CLINIC | Age: 54
End: 2024-12-19
Payer: COMMERCIAL

## 2024-12-19 DIAGNOSIS — R39.9 UTI SYMPTOMS: ICD-10-CM

## 2024-12-19 LAB
BACTERIA UR QL AUTO: ABNORMAL /HPF
BILIRUB UR QL STRIP: NEGATIVE
CLARITY UR: CLEAR
COLOR UR: COLORLESS
GLUCOSE UR STRIP-MCNC: NEGATIVE MG/DL
HGB UR QL STRIP.AUTO: ABNORMAL
KETONES UR STRIP-MCNC: NEGATIVE MG/DL
LEUKOCYTE ESTERASE UR QL STRIP: ABNORMAL
NITRITE UR QL STRIP: NEGATIVE
NON-SQ EPI CELLS URNS QL MICRO: ABNORMAL /HPF
PH UR STRIP.AUTO: 6 [PH]
PROT UR STRIP-MCNC: NEGATIVE MG/DL
RBC #/AREA URNS AUTO: ABNORMAL /HPF
SP GR UR STRIP.AUTO: 1 (ref 1–1.03)
UROBILINOGEN UR STRIP-ACNC: <2 MG/DL
WBC #/AREA URNS AUTO: ABNORMAL /HPF

## 2024-12-19 PROCEDURE — 87186 SC STD MICRODIL/AGAR DIL: CPT

## 2024-12-19 PROCEDURE — 87086 URINE CULTURE/COLONY COUNT: CPT

## 2024-12-19 PROCEDURE — 81001 URINALYSIS AUTO W/SCOPE: CPT

## 2024-12-19 PROCEDURE — 87077 CULTURE AEROBIC IDENTIFY: CPT

## 2024-12-19 NOTE — TELEPHONE ENCOUNTER
Patient reporting burning since this morning with frequency;   Denies back pain, or fever, or pressure.  Has been doing well all week until today;      Reports clear yellow urine.  Denies hematuria. States that she had the PVR completed last week and feels like she is urinating well.  Patient does not take Mirabegron;     Hydration/ER precautions reviewed    Patient is aware that last urine culture done 12/10/24 was negative.     Patient is requesting urine culture.      Urine labs ordered;

## 2024-12-20 ENCOUNTER — TELEPHONE (OUTPATIENT)
Age: 54
End: 2024-12-20

## 2024-12-20 DIAGNOSIS — R39.9 UTI SYMPTOMS: Primary | ICD-10-CM

## 2024-12-20 RX ORDER — NITROFURANTOIN 25; 75 MG/1; MG/1
100 CAPSULE ORAL 2 TIMES DAILY
Qty: 10 CAPSULE | Refills: 0 | Status: SHIPPED | OUTPATIENT
Start: 2024-12-20 | End: 2024-12-25

## 2024-12-20 NOTE — TELEPHONE ENCOUNTER
Patient called in with daughter to translate. Inquiring about results of urine testing as they can see it in mychart. Reporting still having burning, frequency and now having hematuria w/o clots as well. Please review. Reviewed Hydration, avoidance of bladder irritants and ED precautions.     [id number] - [place] - [id number] [[id number]]     Urine Culture >100,000 cfu/ml Gram Negative Richard Abnormal

## 2024-12-20 NOTE — TELEPHONE ENCOUNTER
Called and spoke with daughter. She answered patient's phone as patient was driving. Informed that abx were sent but we are still waiting on finalized culture results. Verbalized understanding.

## 2024-12-21 LAB — BACTERIA UR CULT: ABNORMAL

## 2024-12-23 ENCOUNTER — RESULTS FOLLOW-UP (OUTPATIENT)
Dept: UROLOGY | Facility: CLINIC | Age: 54
End: 2024-12-23

## 2025-01-13 ENCOUNTER — OFFICE VISIT (OUTPATIENT)
Dept: UROLOGY | Facility: CLINIC | Age: 55
End: 2025-01-13
Payer: COMMERCIAL

## 2025-01-13 VITALS
BODY MASS INDEX: 24.98 KG/M2 | HEIGHT: 63 IN | RESPIRATION RATE: 14 BRPM | WEIGHT: 141 LBS | SYSTOLIC BLOOD PRESSURE: 118 MMHG | HEART RATE: 64 BPM | DIASTOLIC BLOOD PRESSURE: 68 MMHG

## 2025-01-13 DIAGNOSIS — R39.15 URGENCY OF URINATION: Primary | ICD-10-CM

## 2025-01-13 LAB — POST-VOID RESIDUAL VOLUME, ML POC: 61 ML

## 2025-01-13 PROCEDURE — 99213 OFFICE O/P EST LOW 20 MIN: CPT

## 2025-01-13 PROCEDURE — 51798 US URINE CAPACITY MEASURE: CPT

## 2025-01-13 NOTE — PROGRESS NOTES
1/13/2025      No chief complaint on file.        Assessment and Plan    54 y.o. female       Urgency of Urination  -s/p 100 units of bladder botox in the office with Dr. Lyons (10/30/24).  -Patient states she did not notice any symptom improvement in urgency or frequency of urination. She does not wish to have bladder botox again.   -She previously trialed oxybutynin, myrbetriq was cost prohibitive. We did discuss trialing another medication; patient states that she was previously bothered by dry mouth with oxybutynin and already has chronic dry eyes. She does not wish to purse pharmacologic intervention for symptom management  -PVR today is 61 mls.     2. Recurrent UTIs.  -Urine culture positive for E.coli >100,000 (12/19/24). Prior to this, patient does state recurrent UTI history although urine culture data does not support this.  -Patient denies current dysuria, flank pain, and gross hematuria.  -Recommend continuing topical vaginal estrogen 3 nights per week  -We discussed increasing fluid intake, cranberry supplements, and women's health probiotics.  -Patient requesting to follow-up in office in 6 months time for symptom re-evaluation.  -All questions addressed.  -Please do not hesitate to reach out with further questions or concerns.        History of Present Illness  Clay Dixon is a 54 y.o. female here for follow-up of urinary urgency and recurrent UTI.       Review of Systems   Constitutional:  Negative for chills and fever.   HENT:  Negative for ear pain and sore throat.    Eyes:  Negative for pain and visual disturbance.   Respiratory:  Negative for cough and shortness of breath.    Cardiovascular:  Negative for chest pain and palpitations.   Gastrointestinal:  Negative for abdominal pain, nausea and vomiting.   Genitourinary:  Negative for difficulty urinating, dysuria, flank pain, frequency, hematuria, pelvic pain and urgency.   Musculoskeletal:  Negative for arthralgias and back pain.   Skin:   "Negative for color change and rash.   Neurological:  Negative for seizures and syncope.   All other systems reviewed and are negative.               Vitals  Vitals:    01/13/25 1049   BP: 118/68   BP Location: Left arm   Patient Position: Sitting   Cuff Size: Standard   Pulse: 64   Resp: 14   Weight: 64 kg (141 lb)   Height: 5' 3\" (1.6 m)       Physical Exam  Constitutional:       Appearance: Normal appearance.   HENT:      Head: Normocephalic.   Eyes:      Extraocular Movements: Extraocular movements intact.      Pupils: Pupils are equal, round, and reactive to light.   Pulmonary:      Effort: Pulmonary effort is normal. No respiratory distress.   Musculoskeletal:         General: Normal range of motion.      Cervical back: Normal range of motion.   Neurological:      Mental Status: She is alert and oriented to person, place, and time.   Psychiatric:         Mood and Affect: Mood normal.         Behavior: Behavior normal.         Thought Content: Thought content normal.         Judgment: Judgment normal.           Past History  Past Medical History:   Diagnosis Date    Asthma     GERD (gastroesophageal reflux disease)     Hyperlipidemia     Leukocytosis     last assessed 7/31/17     Social History     Socioeconomic History    Marital status: Single     Spouse name: None    Number of children: 4    Years of education: None    Highest education level: None   Occupational History    None   Tobacco Use    Smoking status: Never    Smokeless tobacco: Never   Vaping Use    Vaping status: Never Used   Substance and Sexual Activity    Alcohol use: Yes     Comment: social    Drug use: No    Sexual activity: Yes     Partners: Male   Other Topics Concern    None   Social History Narrative    None     Social Drivers of Health     Financial Resource Strain: Not on file   Food Insecurity: Not on file   Transportation Needs: Not on file   Physical Activity: Not on file   Stress: Not on file   Social Connections: Not on file " "  Intimate Partner Violence: Not on file   Housing Stability: Not on file     Social History     Tobacco Use   Smoking Status Never   Smokeless Tobacco Never     History reviewed. No pertinent family history.    The following portions of the patient's history were reviewed and updated as appropriate: allergies, current medications, past medical history, past social history, past surgical history and problem list.    Results  Recent Results (from the past hour)   POCT Measure PVR    Collection Time: 01/13/25 10:51 AM   Result Value Ref Range    POST-VOID RESIDUAL VOLUME, ML POC 61 mL   ]  No results found for: \"PSA\"  Lab Results   Component Value Date    CALCIUM 9.1 09/30/2024    K 3.9 09/30/2024    CO2 31 09/30/2024    CL 99 09/30/2024    BUN 11 09/30/2024    CREATININE 0.64 09/30/2024     Lab Results   Component Value Date    WBC 4.1 09/30/2024    HGB 15.0 09/30/2024    HCT 44.3 09/30/2024    MCV 90.2 09/30/2024     09/30/2024       OSWALD Dodson  "

## 2025-01-13 NOTE — PATIENT INSTRUCTIONS
You can purchase over the counter cranberry supplements and Women's health probiotics at your local grocery store or pharmacy. I also recommend staying hydrated and drinking 8-10 glasses of water per day. You should also keep using your topical vaginal cream; apply a pea sized amount into the vagina 3 nights per week.

## 2025-01-31 NOTE — PROGRESS NOTES
"Assessment/Plan    Diagnoses and all orders for this visit:    Cystocele with prolapse  -     Ambulatory Referral to Physical Therapy; Future  -     Ambulatory Referral to Urogynecology; Future  -     Urine culture    Vaginal pain  -     Urine culture    Physical exam significant for grade 1 cystocele. We reviewed trying pelvic floor PT, which the patient would like to think about. We also discussed alternative surgical management, which she is considering. Referral to pelvic PT and urogynecology. Will also follow up urine culture.    As for her vaginal pain, we reviewed that considering the timing of it, it could have been secondary to her injections. Pt to return if pt comes back. Pt to also return for annual and pap smear as she has not seen a gynecologist in \"many years.\"        Subjective  Chief Complaint   Patient presents with    Gynecology Problem         Cam Abel Dixon is a 54 y.o. female here for a problem visit. She reports a vaginal pain that has been ongoing in the past for the few weeks after each bladder botox injection she gets. Her last injection was in August and she has not had any pain since she has stopped the injections. However, she is unsure why she was having the pain and wants to know if it can be treated. She denies any discharge, itching or dysuria. She does report a history of increased urinary frequency for which she is being treated by urology. She has not had this pain in the last 4 months but when she had it, it felt like a \"slow constant pain inside the vagina.\"  She also reports occasionally noticing a bulge after walking all day that goes away after laying down overnight.     Patient Active Problem List   Diagnosis    Allergic rhinitis    Asthma, mild intermittent    Hyperlipidemia    Need for influenza vaccination    Encounter for screening mammogram for breast cancer    Prediabetes    Frequency of micturition    Gastroesophageal reflux disease without esophagitis    Upper back " "pain, chronic    Neck mass    Thyroid nodule    Urgency of urination    Vaginal itching    Skin rash    Pelvic and perineal pain         Gynecologic History  No LMP recorded. Patient is postmenopausal.    Obstetric History  OB History   No obstetric history on file.         Allergies  Pollen extract    Medications    Current Outpatient Medications:     albuterol (ProAir HFA) 90 mcg/act inhaler, Inhale 2 puffs every 6 (six) hours as needed for shortness of breath, Disp: 18 g, Rfl: 1    Ascorbic Acid (vitamin C) 100 MG tablet, Take 100 mg by mouth daily, Disp: , Rfl:     atorvastatin (LIPITOR) 10 mg tablet, TAKE 1/2 TABLET BY MOUTH DAILY, Disp: 45 tablet, Rfl: 1    estradiol (ESTRACE VAGINAL) 0.1 mg/g vaginal cream, Apply pea sized amount around urethra and inside vaginal opening 3 times weekly, Disp: 42.5 g, Rfl: 1    fluticasone (FLONASE) 50 mcg/act nasal spray, SPRAY 2 SPRAYS INTO EACH NOSTRIL EVERY DAY, Disp: 48 mL, Rfl: 1    loratadine (CLARITIN) 10 mg tablet, Take 1 tablet (10 mg total) by mouth daily, Disp: 90 tablet, Rfl: 2    multivitamin (THERAGRAN) TABS, Take 1 tablet by mouth daily, Disp: , Rfl:     NON FORMULARY, Apply 30 mL topically daily at bedtime Cleanse & apply 2 pumps to entire face at night or as directed by doctor., Disp: , Rfl:     cephalexin (KEFLEX) 250 mg capsule, , Disp: , Rfl:     Mirabegron ER 25 MG TB24, Take 25 mg by mouth in the morning (Patient not taking: Reported on 10/3/2024), Disp: 30 tablet, Rfl: 3      Review of Systems  Review of Systems       Objective     /62 (BP Location: Right arm, Patient Position: Sitting, Cuff Size: Standard)   Ht 5' 3\" (1.6 m)   Wt 64.6 kg (142 lb 6.4 oz)   BMI 25.23 kg/m²       Physical Exam  Constitutional:       Appearance: Normal appearance.   Genitourinary:      Right Labia: No rash or lesions.     Left Labia: No lesions or rash.     No vaginal discharge.      Anterior vaginal prolapse present.     No vaginal atrophy present.     No " cervical motion tenderness, discharge or polyp.   HENT:      Head: Normocephalic and atraumatic.      Mouth/Throat:      Mouth: Mucous membranes are dry.   Eyes:      Extraocular Movements: Extraocular movements intact.   Pulmonary:      Effort: Pulmonary effort is normal.   Musculoskeletal:         General: Normal range of motion.   Neurological:      Mental Status: She is alert. Mental status is at baseline.   Skin:     General: Skin is warm and dry.   Psychiatric:         Mood and Affect: Mood normal.         Behavior: Behavior normal.

## 2025-02-03 ENCOUNTER — OFFICE VISIT (OUTPATIENT)
Dept: OBGYN CLINIC | Facility: CLINIC | Age: 55
End: 2025-02-03
Payer: COMMERCIAL

## 2025-02-03 VITALS
BODY MASS INDEX: 25.23 KG/M2 | WEIGHT: 142.4 LBS | SYSTOLIC BLOOD PRESSURE: 104 MMHG | DIASTOLIC BLOOD PRESSURE: 62 MMHG | HEIGHT: 63 IN

## 2025-02-03 DIAGNOSIS — N81.4 CYSTOCELE WITH PROLAPSE: Primary | ICD-10-CM

## 2025-02-03 DIAGNOSIS — R10.2 VAGINAL PAIN: ICD-10-CM

## 2025-02-03 PROCEDURE — 87086 URINE CULTURE/COLONY COUNT: CPT | Performed by: OBSTETRICS & GYNECOLOGY

## 2025-02-03 PROCEDURE — 99213 OFFICE O/P EST LOW 20 MIN: CPT | Performed by: OBSTETRICS & GYNECOLOGY

## 2025-02-05 ENCOUNTER — RESULTS FOLLOW-UP (OUTPATIENT)
Dept: LABOR AND DELIVERY | Facility: HOSPITAL | Age: 55
End: 2025-02-05

## 2025-02-05 LAB — BACTERIA UR CULT: NORMAL

## 2025-04-09 ENCOUNTER — APPOINTMENT (OUTPATIENT)
Dept: LAB | Facility: CLINIC | Age: 55
End: 2025-04-09
Payer: COMMERCIAL

## 2025-04-09 DIAGNOSIS — E78.2 MIXED HYPERLIPIDEMIA: ICD-10-CM

## 2025-04-09 DIAGNOSIS — R73.03 PREDIABETES: ICD-10-CM

## 2025-04-09 LAB
ALBUMIN SERPL BCG-MCNC: 4.3 G/DL (ref 3.5–5)
ALP SERPL-CCNC: 44 U/L (ref 34–104)
ALT SERPL W P-5'-P-CCNC: 18 U/L (ref 7–52)
ANION GAP SERPL CALCULATED.3IONS-SCNC: 4 MMOL/L (ref 4–13)
AST SERPL W P-5'-P-CCNC: 18 U/L (ref 13–39)
BILIRUB SERPL-MCNC: 0.8 MG/DL (ref 0.2–1)
BUN SERPL-MCNC: 12 MG/DL (ref 5–25)
CALCIUM SERPL-MCNC: 8.9 MG/DL (ref 8.4–10.2)
CHLORIDE SERPL-SCNC: 99 MMOL/L (ref 96–108)
CHOLEST SERPL-MCNC: 163 MG/DL (ref ?–200)
CO2 SERPL-SCNC: 32 MMOL/L (ref 21–32)
CREAT SERPL-MCNC: 0.58 MG/DL (ref 0.6–1.3)
EST. AVERAGE GLUCOSE BLD GHB EST-MCNC: 126 MG/DL
GFR SERPL CREATININE-BSD FRML MDRD: 103 ML/MIN/1.73SQ M
GLUCOSE P FAST SERPL-MCNC: 107 MG/DL (ref 65–99)
HBA1C MFR BLD: 6 %
HDLC SERPL-MCNC: 56 MG/DL
LDLC SERPL CALC-MCNC: 98 MG/DL (ref 0–100)
NONHDLC SERPL-MCNC: 107 MG/DL
POTASSIUM SERPL-SCNC: 3.8 MMOL/L (ref 3.5–5.3)
PROT SERPL-MCNC: 7.1 G/DL (ref 6.4–8.4)
SODIUM SERPL-SCNC: 135 MMOL/L (ref 135–147)
TRIGL SERPL-MCNC: 47 MG/DL (ref ?–150)

## 2025-04-09 PROCEDURE — 80053 COMPREHEN METABOLIC PANEL: CPT

## 2025-04-09 PROCEDURE — 83036 HEMOGLOBIN GLYCOSYLATED A1C: CPT

## 2025-04-09 PROCEDURE — 36415 COLL VENOUS BLD VENIPUNCTURE: CPT

## 2025-04-09 PROCEDURE — 80061 LIPID PANEL: CPT

## 2025-04-17 ENCOUNTER — RESULTS FOLLOW-UP (OUTPATIENT)
Dept: FAMILY MEDICINE CLINIC | Facility: CLINIC | Age: 55
End: 2025-04-17

## 2025-04-21 ENCOUNTER — OFFICE VISIT (OUTPATIENT)
Dept: FAMILY MEDICINE CLINIC | Facility: CLINIC | Age: 55
End: 2025-04-21
Payer: COMMERCIAL

## 2025-04-21 VITALS
DIASTOLIC BLOOD PRESSURE: 70 MMHG | RESPIRATION RATE: 16 BRPM | WEIGHT: 138 LBS | HEIGHT: 63 IN | HEART RATE: 65 BPM | BODY MASS INDEX: 24.45 KG/M2 | SYSTOLIC BLOOD PRESSURE: 110 MMHG | OXYGEN SATURATION: 100 %

## 2025-04-21 DIAGNOSIS — L23.9 ALLERGIC DERMATITIS: ICD-10-CM

## 2025-04-21 DIAGNOSIS — J30.1 SEASONAL ALLERGIC RHINITIS DUE TO POLLEN: ICD-10-CM

## 2025-04-21 DIAGNOSIS — R73.03 PREDIABETES: Primary | ICD-10-CM

## 2025-04-21 DIAGNOSIS — M25.461 BILATERAL KNEE SWELLING: Chronic | ICD-10-CM

## 2025-04-21 DIAGNOSIS — E78.2 MIXED HYPERLIPIDEMIA: ICD-10-CM

## 2025-04-21 DIAGNOSIS — J45.20 MILD INTERMITTENT ASTHMA WITHOUT COMPLICATION: ICD-10-CM

## 2025-04-21 DIAGNOSIS — M25.462 BILATERAL KNEE SWELLING: Chronic | ICD-10-CM

## 2025-04-21 PROCEDURE — 99214 OFFICE O/P EST MOD 30 MIN: CPT | Performed by: FAMILY MEDICINE

## 2025-04-21 RX ORDER — ATORVASTATIN CALCIUM 10 MG/1
TABLET, FILM COATED ORAL
Qty: 45 TABLET | Refills: 1 | Status: SHIPPED | OUTPATIENT
Start: 2025-04-21

## 2025-04-21 RX ORDER — CLOBETASOL PROPIONATE 0.5 MG/G
OINTMENT TOPICAL 2 TIMES DAILY
Qty: 45 G | Refills: 1 | Status: SHIPPED | OUTPATIENT
Start: 2025-04-21

## 2025-04-21 RX ORDER — FLUTICASONE PROPIONATE 50 MCG
2 SPRAY, SUSPENSION (ML) NASAL DAILY
Qty: 48 ML | Refills: 2 | Status: SHIPPED | OUTPATIENT
Start: 2025-04-21

## 2025-04-21 RX ORDER — LORATADINE 10 MG/1
10 TABLET ORAL DAILY
Qty: 90 TABLET | Refills: 2 | Status: SHIPPED | OUTPATIENT
Start: 2025-04-21

## 2025-04-21 NOTE — ASSESSMENT & PLAN NOTE
- Patient reports intermittent itching to arms and legs. She suspects it is due to insect bites.  Orders:  •  clobetasol (TEMOVATE) 0.05 % ointment; Apply topically 2 (two) times a day

## 2025-04-21 NOTE — PROGRESS NOTES
Name: Clay Dixon      : 1970      MRN: [id number]  Encounter Provider: Ana Cristina Garcia MD  Encounter Date: 2025   Encounter department: [facility]  :  Assessment & Plan  Seasonal allergic rhinitis due to pollen  - Patient is not having allergy symptoms at this time, but needed refills of her allergy medications (Flonase, Claritin) in anticipation of the allergy season.  Orders:  •  fluticasone (FLONASE) 50 mcg/act nasal spray; 2 sprays into each nostril daily  •  loratadine (CLARITIN) 10 mg tablet; Take 1 tablet (10 mg total) by mouth daily    Mild intermittent asthma without complication  - Patient says her asthma has been controlled, she has not had to use her inhaler much.  Orders:  •  fluticasone (FLONASE) 50 mcg/act nasal spray; 2 sprays into each nostril daily    Mixed hyperlipidemia  - Cholesterol/lipid panel improved today. Patient says she is not taking her lipitor every day secondary to myalgias. Emphasized the importance of taking her medication every day, as this could lead to increases in her lab values.  Orders:  •  atorvastatin (LIPITOR) 10 mg tablet; TAKE 1/2 TABLET BY MOUTH DAILY  •  CBC and differential; Future  •  Comprehensive metabolic panel; Future  •  Lipid panel; Future    Prediabetes  - HbA1C 6.0 today with a fasting glucose of 107. Discussed the importance of proper diet and exercise.  Orders:  •  Comprehensive metabolic panel; Future  •  Hemoglobin A1C; Future    Allergic dermatitis  - Patient reports intermittent itching to arms and legs. She suspects it is due to insect bites.  Orders:  •  clobetasol (TEMOVATE) 0.05 % ointment; Apply topically 2 (two) times a day    Bilateral knee swelling  - Patient reports bilateral knee swelling that has been going on for about a year. She says she intermittently gets pain after participating in a lot of activity. She also endorses crepitus.  - Presentation suspicious for osteoarthritis, Encouraged patient to  "continue supportive care such as ice, heat, or Advil/Tylenol PRN pain.       F/u 6 months        History of Present Illness   HPI  Patient is a 53 y/o female with a PMH of allergic rhinitis, HLD, and prediabetes presenting for a 6 mo follow up/lab review. Patient reports that she has been eating well, avoiding sugars + eating lean meats and lettuce. She also endorses walking for about an hour every day. Patient reports that she has not been having allergy symptoms yet, but would like refills on her allergy medications so she is prepared for when symptoms do recur. She also notes that she has been having a lot of itchy areas on her skin, especially in parts where she gets bug bites. She says that her daughter gave her Clobetasol cream, which helped.    Review of Systems   Constitutional:  Negative for chills and fever.   HENT:  Negative for congestion, postnasal drip and rhinorrhea.    Respiratory:  Negative for shortness of breath.    Cardiovascular:  Negative for chest pain, palpitations and leg swelling.   Gastrointestinal:  Negative for abdominal pain, constipation, diarrhea, nausea and vomiting.   Genitourinary:  Negative for dysuria, frequency and urgency.   Musculoskeletal:  Positive for arthralgias, back pain and joint swelling (Around knees).   Skin:  Negative for color change and rash.       Objective   /70   Pulse 65   Resp 16   Ht 5' 3\" (1.6 m)   Wt 62.6 kg (138 lb)   SpO2 100%   BMI 24.45 kg/m²      Physical Exam  Vitals and nursing note reviewed.   Constitutional:       General: She is not in acute distress.     Appearance: Normal appearance. She is normal weight.   HENT:      Head: Normocephalic and atraumatic.   Cardiovascular:      Rate and Rhythm: Normal rate and regular rhythm.      Pulses: Normal pulses.      Heart sounds: Normal heart sounds. No murmur heard.     No friction rub. No gallop.   Pulmonary:      Effort: Pulmonary effort is normal. No respiratory distress.      Breath " sounds: Normal breath sounds. No wheezing, rhonchi or rales.   Chest:      Chest wall: No tenderness.   Musculoskeletal:         General: Swelling (Some swelling at bilateral knee joints) present. No tenderness. Normal range of motion.      Right knee: Crepitus present.      Left knee: Crepitus present.      Comments: Occasional erythematous, papular lesions present. Suspicious for mosquito bites. Patient does not endorse itching this time.    Skin:     General: Skin is warm and dry.   Neurological:      General: No focal deficit present.      Mental Status: She is alert and oriented to person, place, and time.   Psychiatric:         Mood and Affect: Mood normal.         Behavior: Behavior normal.

## 2025-04-21 NOTE — ASSESSMENT & PLAN NOTE
- Patient says her asthma has been controlled, she has not had to use her inhaler much.  Orders:  •  fluticasone (FLONASE) 50 mcg/act nasal spray; 2 sprays into each nostril daily

## 2025-04-21 NOTE — ASSESSMENT & PLAN NOTE
- HbA1C 6.0 today with a fasting glucose of 107. Discussed the importance of proper diet and exercise.  Orders:  •  Comprehensive metabolic panel; Future  •  Hemoglobin A1C; Future

## 2025-04-21 NOTE — ASSESSMENT & PLAN NOTE
- Patient reports bilateral knee swelling that has been going on for about a year. She says she intermittently gets pain after participating in a lot of activity. She also endorses crepitus.  - Presentation suspicious for osteoarthritis, Encouraged patient to continue supportive care such as ice, heat, or Advil/Tylenol PRN pain.

## 2025-04-21 NOTE — ASSESSMENT & PLAN NOTE
- Cholesterol/lipid panel improved today. Patient says she is not taking her lipitor every day secondary to myalgias. Emphasized the importance of taking her medication every day, as this could lead to increases in her lab values.  Orders:  •  atorvastatin (LIPITOR) 10 mg tablet; TAKE 1/2 TABLET BY MOUTH DAILY  •  CBC and differential; Future  •  Comprehensive metabolic panel; Future  •  Lipid panel; Future

## 2025-04-21 NOTE — ASSESSMENT & PLAN NOTE
- Patient is not having allergy symptoms at this time, but needed refills of her allergy medications (Flonase, Claritin) in anticipation of the allergy season.  Orders:  •  fluticasone (FLONASE) 50 mcg/act nasal spray; 2 sprays into each nostril daily  •  loratadine (CLARITIN) 10 mg tablet; Take 1 tablet (10 mg total) by mouth daily

## 2025-04-24 NOTE — PROGRESS NOTES
ASSESSMENT & PLAN:   Diagnoses and all orders for this visit:    Encounter for screening mammogram for malignant neoplasm of breast  -     Mammo screening bilateral w 3d and cad    Encounter for well woman exam with routine gynecological exam          The following were reviewed in today's visit: ASCCP guidelines, mammography screening ordered.    Patient to return to office in yearly for annual exam.     All questions have been answered to her satisfaction.        CC:  Annual Gynecologic Examination  Chief Complaint   Patient presents with    Gynecologic Exam     Clay Dixon is here for her annual exam; pap UTD/mammo ordered.  (Last pap 2024 neg/HPV neg  Mammo 11   Colonoscopy 22, repeat 10 years )         HPI: Clay Dixon is a 54 y.o. No obstetric history on file. who presents for annual gynecologic examination.  She has the following concerns:  none      Health Maintenance:    Exercise: frequently  Breast exams/breast awareness: yes  Last mammogram: , wnl - ordered various since but none completed, reminded to complete today  Colorectal cancer screenin, wnl    Past Medical History:   Diagnosis Date    Allergic     Asthma     GERD (gastroesophageal reflux disease)     Hyperlipidemia     Leukocytosis     last assessed 17    Urinary tract infection        Past Surgical History:   Procedure Laterality Date    BLADDER SURGERY      bladder sling? approx 7 years    COSMETIC SURGERY      CYSTOSCOPY      diagnostic,last assessed 10/17/17    OTHER SURGICAL HISTORY      suprapublic sling       Past OB/Gyn History:   No LMP recorded. Patient is postmenopausal.    Menopausal status: postmenopausal  Menopausal symptoms: None    Last Pap: 22 : no abnormalities  History of abnormal Pap smear: no    Patient is currently sexually active.   STD testing: no  Current contraception: menopausal      Family History  History reviewed. No pertinent family history.    Family history of uterine or  ovarian cancer: no  Family history of breast cancer: no  Family history of colon cancer: no    Social History:  Social History     Socioeconomic History    Marital status: Single     Spouse name: Not on file    Number of children: 4    Years of education: Not on file    Highest education level: Not on file   Occupational History    Not on file   Tobacco Use    Smoking status: Never     Passive exposure: Never    Smokeless tobacco: Never   Vaping Use    Vaping status: Never Used   Substance and Sexual Activity    Alcohol use: Not Currently     Comment: social    Drug use: Never    Sexual activity: Not Currently     Partners: Male     Birth control/protection: None   Other Topics Concern    Not on file   Social History Narrative    Not on file     Social Drivers of Health     Financial Resource Strain: Not on file   Food Insecurity: Not on file   Transportation Needs: Not on file   Physical Activity: Not on file   Stress: Not on file   Social Connections: Not on file   Intimate Partner Violence: Not on file   Housing Stability: Not on file     Domestic violence screen: negative    Allergies:  Allergies   Allergen Reactions    Pollen Extract        Medications:    Current Outpatient Medications:     albuterol (ProAir HFA) 90 mcg/act inhaler, Inhale 2 puffs every 6 (six) hours as needed for shortness of breath, Disp: 18 g, Rfl: 1    Ascorbic Acid (vitamin C) 100 MG tablet, Take 100 mg by mouth daily, Disp: , Rfl:     atorvastatin (LIPITOR) 10 mg tablet, TAKE 1/2 TABLET BY MOUTH DAILY, Disp: 45 tablet, Rfl: 1    clobetasol (TEMOVATE) 0.05 % ointment, Apply topically 2 (two) times a day, Disp: 45 g, Rfl: 1    estradiol (ESTRACE VAGINAL) 0.1 mg/g vaginal cream, Apply pea sized amount around urethra and inside vaginal opening 3 times weekly, Disp: 42.5 g, Rfl: 1    fluticasone (FLONASE) 50 mcg/act nasal spray, 2 sprays into each nostril daily, Disp: 48 mL, Rfl: 2    loratadine (CLARITIN) 10 mg tablet, Take 1 tablet (10 mg  "total) by mouth daily, Disp: 90 tablet, Rfl: 2    multivitamin (THERAGRAN) TABS, Take 1 tablet by mouth daily, Disp: , Rfl:     Mirabegron ER 25 MG TB24, Take 25 mg by mouth in the morning (Patient not taking: Reported on 10/3/2024), Disp: 30 tablet, Rfl: 3    NON FORMULARY, Apply 30 mL topically daily at bedtime Cleanse & apply 2 pumps to entire face at night or as directed by doctor. (Patient not taking: Reported on 4/25/2025), Disp: , Rfl:     Review of Systems:  Review of Systems      Physical Exam:  /66 (BP Location: Right arm, Patient Position: Sitting, Cuff Size: Standard)   Ht 5' 3\" (1.6 m)   Wt 63.6 kg (140 lb 3.2 oz)   BMI 24.84 kg/m²    Physical Exam  Constitutional:       Appearance: Normal appearance.   Genitourinary:      Right Labia: No rash, lesions or skin changes.     Left Labia: No lesions, skin changes or rash.       Right Adnexa: not tender and no mass present.     Left Adnexa: not tender and no mass present.     No cervical motion tenderness, discharge or lesion.      Uterus is not enlarged or tender.   Breasts:     Right: No mass, nipple discharge, skin change or tenderness.      Left: No mass, nipple discharge, skin change or tenderness.   HENT:      Head: Normocephalic and atraumatic.   Eyes:      Extraocular Movements: Extraocular movements intact.   Cardiovascular:      Rate and Rhythm: Normal rate and regular rhythm.   Pulmonary:      Effort: Pulmonary effort is normal.      Breath sounds: Normal breath sounds.   Abdominal:      General: There is no distension.      Palpations: Abdomen is soft.      Tenderness: There is no abdominal tenderness. There is no guarding.   Musculoskeletal:         General: Normal range of motion.   Neurological:      Mental Status: She is alert. Mental status is at baseline.   Skin:     General: Skin is warm and dry.   Psychiatric:         Mood and Affect: Mood normal.         Behavior: Behavior normal.                              "

## 2025-04-25 ENCOUNTER — ANNUAL EXAM (OUTPATIENT)
Dept: OBGYN CLINIC | Facility: CLINIC | Age: 55
End: 2025-04-25
Payer: COMMERCIAL

## 2025-04-25 VITALS
BODY MASS INDEX: 24.84 KG/M2 | SYSTOLIC BLOOD PRESSURE: 104 MMHG | WEIGHT: 140.2 LBS | HEIGHT: 63 IN | DIASTOLIC BLOOD PRESSURE: 66 MMHG

## 2025-04-25 DIAGNOSIS — Z01.419 ENCOUNTER FOR WELL WOMAN EXAM WITH ROUTINE GYNECOLOGICAL EXAM: ICD-10-CM

## 2025-04-25 DIAGNOSIS — Z12.31 ENCOUNTER FOR SCREENING MAMMOGRAM FOR MALIGNANT NEOPLASM OF BREAST: Primary | ICD-10-CM

## 2025-04-25 PROCEDURE — S0612 ANNUAL GYNECOLOGICAL EXAMINA: HCPCS | Performed by: OBSTETRICS & GYNECOLOGY

## 2025-08-18 ENCOUNTER — OFFICE VISIT (OUTPATIENT)
Dept: UROLOGY | Facility: CLINIC | Age: 55
End: 2025-08-18
Payer: COMMERCIAL

## 2025-08-18 VITALS
OXYGEN SATURATION: 99 % | DIASTOLIC BLOOD PRESSURE: 80 MMHG | HEIGHT: 63 IN | HEART RATE: 72 BPM | SYSTOLIC BLOOD PRESSURE: 104 MMHG | BODY MASS INDEX: 23.21 KG/M2 | WEIGHT: 131 LBS

## 2025-08-18 DIAGNOSIS — R35.0 FREQUENCY OF URINATION: Primary | ICD-10-CM

## 2025-08-18 PROCEDURE — 99213 OFFICE O/P EST LOW 20 MIN: CPT | Performed by: PHYSICIAN ASSISTANT
